# Patient Record
Sex: MALE | Race: BLACK OR AFRICAN AMERICAN | Employment: OTHER | URBAN - METROPOLITAN AREA
[De-identification: names, ages, dates, MRNs, and addresses within clinical notes are randomized per-mention and may not be internally consistent; named-entity substitution may affect disease eponyms.]

---

## 2017-02-03 ENCOUNTER — HOSPITAL ENCOUNTER (OUTPATIENT)
Dept: MRI IMAGING | Age: 62
Discharge: HOME | End: 2017-02-03
Attending: UROLOGY

## 2017-02-03 DIAGNOSIS — N40.1 BENIGN NON-NODULAR PROSTATIC HYPERPLASIA WITH LOWER URINARY TRACT SYMPTOMS: ICD-10-CM

## 2017-02-03 DIAGNOSIS — R97.20 ELEVATED PSA: ICD-10-CM

## 2017-06-22 PROBLEM — R39.89 ABNORMAL PROSTATE EXAM: Status: ACTIVE | Noted: 2017-06-22

## 2018-11-07 ENCOUNTER — HOSPITAL ENCOUNTER (INPATIENT)
Age: 63
LOS: 13 days | Discharge: HOME | DRG: 885 | End: 2018-11-20
Attending: EMERGENCY MEDICINE | Admitting: PSYCHIATRY & NEUROLOGY
Payer: MEDICARE

## 2018-11-07 DIAGNOSIS — F20.9 SCHIZOPHRENIA, UNSPECIFIED TYPE (HCC): Primary | ICD-10-CM

## 2018-11-07 LAB
ALBUMIN SERPL-MCNC: 3.5 G/DL (ref 3.4–5)
ALBUMIN/GLOB SERPL: 0.8 {RATIO} (ref 0.8–1.7)
ALP SERPL-CCNC: 79 U/L (ref 45–117)
ALT SERPL-CCNC: 22 U/L (ref 16–61)
ANION GAP SERPL CALC-SCNC: 9 MMOL/L (ref 3–18)
AST SERPL-CCNC: 13 U/L (ref 15–37)
ATRIAL RATE: 109 BPM
BASOPHILS # BLD: 0 K/UL (ref 0–0.1)
BASOPHILS NFR BLD: 0 % (ref 0–2)
BILIRUB SERPL-MCNC: 0.3 MG/DL (ref 0.2–1)
BUN SERPL-MCNC: 18 MG/DL (ref 7–18)
BUN/CREAT SERPL: 14 (ref 12–20)
CALCIUM SERPL-MCNC: 9.1 MG/DL (ref 8.5–10.1)
CALCULATED P AXIS, ECG09: 83 DEGREES
CALCULATED R AXIS, ECG10: 90 DEGREES
CALCULATED T AXIS, ECG11: 64 DEGREES
CHLORIDE SERPL-SCNC: 103 MMOL/L (ref 100–108)
CK MB CFR SERPL CALC: NORMAL % (ref 0–4)
CK MB SERPL-MCNC: <1 NG/ML (ref 5–25)
CK SERPL-CCNC: 82 U/L (ref 39–308)
CO2 SERPL-SCNC: 24 MMOL/L (ref 21–32)
CREAT SERPL-MCNC: 1.33 MG/DL (ref 0.6–1.3)
DIAGNOSIS, 93000: NORMAL
DIFFERENTIAL METHOD BLD: ABNORMAL
EOSINOPHIL # BLD: 0.1 K/UL (ref 0–0.4)
EOSINOPHIL NFR BLD: 1 % (ref 0–5)
ERYTHROCYTE [DISTWIDTH] IN BLOOD BY AUTOMATED COUNT: 13.3 % (ref 11.6–14.5)
GLOBULIN SER CALC-MCNC: 4.4 G/DL (ref 2–4)
GLUCOSE BLD STRIP.AUTO-MCNC: 219 MG/DL (ref 70–110)
GLUCOSE SERPL-MCNC: 261 MG/DL (ref 74–99)
HCT VFR BLD AUTO: 37.3 % (ref 36–48)
HGB BLD-MCNC: 12.9 G/DL (ref 13–16)
LYMPHOCYTES # BLD: 3.3 K/UL (ref 0.9–3.6)
LYMPHOCYTES NFR BLD: 47 % (ref 21–52)
MAGNESIUM SERPL-MCNC: 2.1 MG/DL (ref 1.6–2.6)
MCH RBC QN AUTO: 31.1 PG (ref 24–34)
MCHC RBC AUTO-ENTMCNC: 34.6 G/DL (ref 31–37)
MCV RBC AUTO: 89.9 FL (ref 74–97)
MONOCYTES # BLD: 0.6 K/UL (ref 0.05–1.2)
MONOCYTES NFR BLD: 9 % (ref 3–10)
NEUTS SEG # BLD: 3.1 K/UL (ref 1.8–8)
NEUTS SEG NFR BLD: 43 % (ref 40–73)
P-R INTERVAL, ECG05: 152 MS
PLATELET # BLD AUTO: 253 K/UL (ref 135–420)
PMV BLD AUTO: 10.4 FL (ref 9.2–11.8)
POTASSIUM SERPL-SCNC: 3.9 MMOL/L (ref 3.5–5.5)
PROT SERPL-MCNC: 7.9 G/DL (ref 6.4–8.2)
Q-T INTERVAL, ECG07: 338 MS
QRS DURATION, ECG06: 84 MS
QTC CALCULATION (BEZET), ECG08: 455 MS
RBC # BLD AUTO: 4.15 M/UL (ref 4.7–5.5)
SODIUM SERPL-SCNC: 136 MMOL/L (ref 136–145)
TROPONIN I SERPL-MCNC: <0.02 NG/ML (ref 0–0.04)
VENTRICULAR RATE, ECG03: 109 BPM
WBC # BLD AUTO: 7.1 K/UL (ref 4.6–13.2)

## 2018-11-07 PROCEDURE — 83735 ASSAY OF MAGNESIUM: CPT | Performed by: EMERGENCY MEDICINE

## 2018-11-07 PROCEDURE — 80053 COMPREHEN METABOLIC PANEL: CPT | Performed by: EMERGENCY MEDICINE

## 2018-11-07 PROCEDURE — 82550 ASSAY OF CK (CPK): CPT | Performed by: EMERGENCY MEDICINE

## 2018-11-07 PROCEDURE — 65220000005 HC RM SEMIPRIVATE PSYCH 3 OR 4 BED

## 2018-11-07 PROCEDURE — 93005 ELECTROCARDIOGRAM TRACING: CPT

## 2018-11-07 PROCEDURE — 82962 GLUCOSE BLOOD TEST: CPT

## 2018-11-07 PROCEDURE — 85025 COMPLETE CBC W/AUTO DIFF WBC: CPT | Performed by: EMERGENCY MEDICINE

## 2018-11-07 PROCEDURE — 99284 EMERGENCY DEPT VISIT MOD MDM: CPT

## 2018-11-07 RX ORDER — LISINOPRIL 20 MG/1
20 TABLET ORAL DAILY
Status: DISCONTINUED | OUTPATIENT
Start: 2018-11-08 | End: 2018-11-20 | Stop reason: HOSPADM

## 2018-11-07 RX ORDER — HALOPERIDOL 5 MG/1
10 TABLET ORAL
Status: DISCONTINUED | OUTPATIENT
Start: 2018-11-07 | End: 2018-11-09

## 2018-11-07 RX ORDER — MAGNESIUM SULFATE 100 %
16 CRYSTALS MISCELLANEOUS AS NEEDED
Status: DISCONTINUED | OUTPATIENT
Start: 2018-11-07 | End: 2018-11-20 | Stop reason: HOSPADM

## 2018-11-07 RX ORDER — DILTIAZEM HYDROCHLORIDE 120 MG/1
120 CAPSULE, COATED, EXTENDED RELEASE ORAL DAILY
Status: DISCONTINUED | OUTPATIENT
Start: 2018-11-08 | End: 2018-11-20 | Stop reason: HOSPADM

## 2018-11-07 RX ORDER — IBUPROFEN 400 MG/1
400 TABLET ORAL
Status: DISCONTINUED | OUTPATIENT
Start: 2018-11-07 | End: 2018-11-20 | Stop reason: HOSPADM

## 2018-11-07 RX ORDER — GLIMEPIRIDE 4 MG/1
4 TABLET ORAL
Status: DISCONTINUED | OUTPATIENT
Start: 2018-11-08 | End: 2018-11-20 | Stop reason: HOSPADM

## 2018-11-07 RX ORDER — PIOGLITAZONEHYDROCHLORIDE 15 MG/1
45 TABLET ORAL
Status: DISCONTINUED | OUTPATIENT
Start: 2018-11-08 | End: 2018-11-20 | Stop reason: HOSPADM

## 2018-11-07 RX ORDER — TRAZODONE HYDROCHLORIDE 50 MG/1
50 TABLET ORAL
Status: DISCONTINUED | OUTPATIENT
Start: 2018-11-07 | End: 2018-11-20 | Stop reason: HOSPADM

## 2018-11-07 RX ORDER — ATORVASTATIN CALCIUM 20 MG/1
10 TABLET, FILM COATED ORAL
Status: DISCONTINUED | OUTPATIENT
Start: 2018-11-07 | End: 2018-11-20 | Stop reason: HOSPADM

## 2018-11-07 RX ORDER — DOCUSATE SODIUM 100 MG/1
100 CAPSULE, LIQUID FILLED ORAL DAILY
Status: DISCONTINUED | OUTPATIENT
Start: 2018-11-08 | End: 2018-11-20 | Stop reason: HOSPADM

## 2018-11-07 RX ORDER — INSULIN LISPRO 100 [IU]/ML
INJECTION, SOLUTION INTRAVENOUS; SUBCUTANEOUS
Status: DISCONTINUED | OUTPATIENT
Start: 2018-11-07 | End: 2018-11-14

## 2018-11-07 RX ORDER — LORAZEPAM 1 MG/1
1 TABLET ORAL
Status: DISCONTINUED | OUTPATIENT
Start: 2018-11-07 | End: 2018-11-20 | Stop reason: HOSPADM

## 2018-11-07 RX ORDER — GUAIFENESIN 100 MG/5ML
81 LIQUID (ML) ORAL DAILY
Status: DISCONTINUED | OUTPATIENT
Start: 2018-11-08 | End: 2018-11-20 | Stop reason: HOSPADM

## 2018-11-07 RX ORDER — HALOPERIDOL 2 MG/1
2 TABLET ORAL
Status: DISCONTINUED | OUTPATIENT
Start: 2018-11-07 | End: 2018-11-20 | Stop reason: HOSPADM

## 2018-11-07 RX ORDER — LORAZEPAM 2 MG/ML
1 INJECTION INTRAMUSCULAR
Status: DISCONTINUED | OUTPATIENT
Start: 2018-11-07 | End: 2018-11-20 | Stop reason: HOSPADM

## 2018-11-07 RX ORDER — LORATADINE 10 MG/1
10 TABLET ORAL
Status: DISCONTINUED | OUTPATIENT
Start: 2018-11-07 | End: 2018-11-20 | Stop reason: HOSPADM

## 2018-11-07 RX ORDER — HALOPERIDOL 5 MG/ML
2 INJECTION INTRAMUSCULAR
Status: DISCONTINUED | OUTPATIENT
Start: 2018-11-07 | End: 2018-11-20 | Stop reason: HOSPADM

## 2018-11-07 RX ORDER — FINASTERIDE 5 MG/1
5 TABLET, FILM COATED ORAL DAILY
Status: DISCONTINUED | OUTPATIENT
Start: 2018-11-08 | End: 2018-11-20 | Stop reason: HOSPADM

## 2018-11-07 NOTE — BH NOTES
The patient arrived on the unit via wheelchair. He was escorted to the unit by ER staff and Security. The patient refused to answer any questions for the admssion assessment. The patient refused to talk to any of the staff. The patient refused to have his  vital signs taken. Monitoring and providing support.

## 2018-11-07 NOTE — ED PROVIDER NOTES
EMERGENCY DEPARTMENT HISTORY AND PHYSICAL EXAM 
 
2:46 PM 
 
 
Date: 11/7/2018 Patient Name: Keila Martin History of Presenting Illness Chief Complaint Patient presents with  Mental Health Problem History Provided By: Patient and  Complaint: Mental Health Problem Duration:6  Hours Timing:  Acute Location: N/A Quality: N/A Severity: Mild Modifying Factors: No modifying or aggravating factors were reported. Associated Symptoms: denies any other associated signs or symptoms Additional History (Context): Keila Martin is a 70 y.o. male with hypertension who presents with an acute mild mental health problem that began 6 hours ago. Police report that the wife called 911 this morning stating that the pt \"wasn't being polite\" and had \"wild eyes\". She stated that she was fearful. Pt reports that he feels okay and he is upset because the police did not explain fully why they brought him to the ED. No modifying or aggravating factors were reported. No other symptoms or concerns were expressed. PCP: Marisol Ruiz DO Past History Review of Systems Review of Systems Constitutional: Negative for chills and fever. Psychiatric/Behavioral: Positive for behavioral problems (mental health problem). All other systems reviewed and are negative. Physical Exam  
 
 
 
Physical Exam  
Constitutional: He is oriented to person, place, and time. He appears well-developed. HENT:  
Head: Normocephalic and atraumatic. Eyes: EOM are normal. Pupils are equal, round, and reactive to light. Neck: Normal range of motion. Neck supple. Cardiovascular: Normal rate, regular rhythm and normal heart sounds. Exam reveals no friction rub. No murmur heard. Pulmonary/Chest: Effort normal and breath sounds normal. No respiratory distress. He has no wheezes. Abdominal: Soft. He exhibits no distension. There is no tenderness.  There is no rebound and no guarding. Musculoskeletal: Normal range of motion. Neurological: He is alert and oriented to person, place, and time. Skin: Skin is warm and dry. Psychiatric: He has a normal mood and affect. His behavior is normal. Thought content normal.  
 
 
 
Diagnostic Study Results EKG shows sinus tach at 109 with normal axis normal intervals there is no ST elevation or depression or hypertrophy. Medical Decision Making I am the first provider for this patient. I reviewed the vital signs, available nursing notes, past medical history, past surgical history, family history and social history. Vital Signs-Reviewed the patient's vital signs. ED Course: Progress Notes, Reevaluation, and Consults: 
 
Provider Notes (Medical Decision Making): This is a 68-year-old gentleman brought in by police for a TDO. He has no complaints  he is alert and oriented ×3. Crisis. He is mildly tachycardic we will send basic labs and reassess. Vs wnl now. 4:50 PM  
Labs reviewed. Crisis to admit. Diagnosis Clinical Impression: No diagnosis found. _______________________________ Attestations: 
Scribe Attestation Deborah Matson acting as a scribe for and in the presence of Silvio Goldman MD     
November 07, 2018 at 2:46 PM 
    
Provider Attestation:     
I personally performed the services described in the documentation, reviewed the documentation, as recorded by the scribe in my presence, and it accurately and completely records my words and actions. November 07, 2018 at 2:46 PM - Silvio Goldman MD   
_______________________________

## 2018-11-07 NOTE — ED TRIAGE NOTES
Patient arrived from home ECO from wife. Police report patient has not been taking medications or bathing self. Patient slow to respond but is cooperative.

## 2018-11-08 LAB
GLUCOSE BLD STRIP.AUTO-MCNC: 148 MG/DL (ref 70–110)
GLUCOSE BLD STRIP.AUTO-MCNC: 162 MG/DL (ref 70–110)
GLUCOSE BLD STRIP.AUTO-MCNC: 172 MG/DL (ref 70–110)

## 2018-11-08 PROCEDURE — 74011250637 HC RX REV CODE- 250/637: Performed by: PSYCHIATRY & NEUROLOGY

## 2018-11-08 PROCEDURE — 65220000005 HC RM SEMIPRIVATE PSYCH 3 OR 4 BED

## 2018-11-08 PROCEDURE — 82962 GLUCOSE BLOOD TEST: CPT

## 2018-11-08 NOTE — BH NOTES
GROUP THERAPY PROGRESS NOTE Quinton Thomas is not participating in [place]. Group time: 30 minutes Personal goal for participation: none Goal orientation: community Group therapy participation: passive Therapeutic interventions reviewed and discussed: goals and procedures Impression of participation: encouraged

## 2018-11-08 NOTE — BH NOTES
The patient is still sitting quietly to himself. He still is not verbally communicating with staff. Still will not answer questions or  allow his vital signs to be taken. In no distress and is being monitored.

## 2018-11-08 NOTE — BH NOTES
Patient was offered dinner earlier, which he refused. He also refused his insulin coverage for tonight.

## 2018-11-08 NOTE — BH NOTES
Pt attempts to intimidate his peers when they come out into the day area. Pt yelled at one of his peers and tried to follow him to his room; staff intervened. Pt abruptly told another peer to \"stop mumbling\". Pt invaded this writer's personal space to ask what I was doing on the computer and to inquire about another Pt. Staff, once again, intervened and Pt is currently resting in the day area. Will continue to monitor for safety.

## 2018-11-08 NOTE — BH NOTES
Pt was lying down, on his mattress, in the day area. A peer from his ASSIGNED ROOM, arrived to the day area and sat down. Pt walked from the far side of the day area, approached his peer, and introduced himself. His peer responded with his name and ended his statement with the word, \"brother\". Pt informed his peer not to call him \"brother\" with an angry tone. Peer appeared to be intimidated and stated, \"yes sir\". Pt walked back over to his mattress. Once his peer was done talking to the nurse, the Pt yelled something across the day area that included his peer's name. His peer reluctantly attempted to respond. This writer intervened and  informed his peer not to respond; he complied. Pt is currently resting in the day area. Will continue to monitor throughout the shift.

## 2018-11-08 NOTE — BH NOTES
Patient alert and pacing the unit. Patient has reached over the nurse's station to turn off the switch. He has not responded to redirection. Patient has been observed staring and slowly raising hand in air and quickly bringing arm back down. Patient was redirected from nurse's station. And asked for the tv to be turned on. Patient was made aware of policy for tv times. Patient wandered day area for a few minutes then laid down.

## 2018-11-08 NOTE — BSMART NOTE
SW assessment/Intervention:  SW made contact with patient as he engaged within the milieu. Patient reports no major concerns at this time. It is reported patient has been refusing medications and was encouraged to comply with medication. Patient remained quiet and expressed no major concerns. SW encouraged group participation and healthy coping skills. SW will continue to monitor patient consulting with treating psychiatrist to complete disposition. COTY Meier, LCSW-E

## 2018-11-08 NOTE — H&P
[address] [facility] Inpatient Admission Note Date of Service:  11/08/18 Historian(s): Viet Dubon and chart review Referral Source: [facility] ED Chief Complaint \"Police officers brought me here\" History of Present Illness Viet Dubon is a 70 y.o. BLACK OR  male with a history of Schizophrenia who was admitted under TDO for exacerbation of psychosis. According to [facility] evaluation, pt's wife took out ECO due to pt not taking medications, hearing voices, not eating, bathing or taking care of ADLs. Wife said he was \"not polite\" and \"wide eyed\". He started showing some bizarre behavior last month and went missing for a day on October 17th. He has also recently walked away from his job because people are staring at him. According to nurse's report, pt has been uncooperative with assessment since admission yesterday evening. He refused all meds and meals. He did allow for his blood sugar to be checked. On evaluation, pt is cooperative with interview and answers questions in a linear and organized fashion. He says he has been taking Haloperidol at home but decided to stop taking other medications. He says \"I found out I could heal myself from sugar Diabetes. \" When asked how he was going to do that, he paused then said \"I can heal myself. \" he denies hallucinations or SI but endorses paranoia. He feels like people are watching him. When asked why he has not been going to work, he said he injured his back early last month and his PCP took him out of work for a few days. When he returned he still had pain in his back, right knee and hip so decided not to go back. Pt denies history of trauma or PTSD symptoms. He denies manic symptoms and history of substance abuse. Psychiatric Review of Systems See HPI Medical Review of Systems Sleep: Poor Appetite: Poor, has lost significant amount of weight. Psychiatric Treatment History Self-injurious behavior/risky thoughts or behaviors (past suicidal ideation/attempt):  
Denies any prior history of thoughts of self-harm or suicidal actions. Violence/Risk to others (past homicidal ideation/attempt):  
Denies any prior history of violence or homicidal ideation. Previous psychiatric medication trials: Haldol Previous psychiatric hospitalizations: He has been hospitalized at [facility] and [facility] but has been stable for a long period of time. Current psychiatric provider: Dr. Sher Deal at [facility]. Allergies No Known Allergies Medical History Past Medical History:  
Diagnosis Date  Blister of foot or toe  BPH (benign prostatic hyperplasia)  DM w/o complication type II ([address]  Elevated prostate specific antigen (PSA)  Hypertension  Lower urinary tract symptoms (LUTS)  Nodular prostate without urinary obstruction  Unspecified disorder of penis History of neurological illness: denies History of head injuries: denies Medication(s) Prior to Admission Medications Prescriptions Last Dose Informant Patient Reported? Taking? Lancets misc Unknown at Unknown time  Yes No  
Si Each. aspirin delayed-release 81 mg tablet Unknown at Unknown time  Yes No  
Si mg.  
atorvastatin (LIPITOR) 10 mg tablet Unknown at Unknown time  Yes No  
Sig: Take  by mouth daily. atorvastatin (LIPITOR) 10 mg tablet Unknown at Unknown time  Yes No  
Sig: 10 mg.  
diltiazem CD (CARDIZEM CD) 120 mg ER capsule Unknown at Unknown time  Yes No  
Sig: Take  by mouth daily. docusate sodium (COLACE) 100 mg capsule Unknown at Unknown time  Yes No  
Si mg.  
finasteride (PROSCAR) 5 mg tablet Unknown at Unknown time  No No  
Sig: Take 1 Tab by mouth daily. finasteride (PROSCAR) 5 mg tablet Unknown at Unknown time  Yes No  
Si mg.  
glimepiride (AMARYL) 4 mg tablet Unknown at Unknown time  Yes No  
Si mg. glucose blood VI test strips (ASCENSIA AUTODISC VI, ONE TOUCH ULTRA TEST VI) strip Unknown at Unknown time  Yes No  
Si Each.  
haloperidol (HALDOL) 0.5 mg tablet Unknown at Unknown time  Yes No  
Sig: Take 0.5 mg by mouth four (4) times daily. haloperidol (HALDOL) 10 mg tablet Unknown at Unknown time  Yes No  
Sig: 10 mg.  
lisinopril (PRINIVIL, ZESTRIL) 20 mg tablet Unknown at Unknown time  Yes No  
Si mg.  
loratadine (CLARITIN) 10 mg tablet Unknown at Unknown time  Yes No  
Sig: Take As Needed. pioglitazone (ACTOS) 30 mg tablet Unknown at Unknown time  Yes No  
Si mg. Facility-Administered Medications: None Substance Abuse History Tobacco: denied Alcohol: denied Marijuana: denied Cocaine: denied Opiate: denied Benzodiazepine: denied Other: denied Consequences: none History of detox: none History of substance abuse treatment: none Family History No family history on file. Psychiatric Family History Sister with mental illness Family history of suicide? No 
 
Social History Pt born and raised in [place]. Raised by parents. Reports normal childhood and denies trauma. He is  and lives with his wife and mother-in-law in [place]. He has GED. He was recently working in [employer]. He is not sure if he still has his job as he has not been to work in several days and has not received any phone call from supervisor. Vitals/Labs Vitals:  
 18 1429 18 1629 18 0800 BP: (!) 152/98 (!) 162/92 (!) 152/98 Pulse: (!) 166 76 100 Resp:  Temp: 98.2 °F (36.8 °C) 97.6 °F (36.4 °C) 97.8 °F (36.6 °C) SpO2: 97% 100% Physical Exam  
Constitutional: He is oriented to person, place, and time. He appears well-developed. HENT:  
Head: Normocephalic and atraumatic. Eyes: EOM are normal. Pupils are equal, round, and reactive to light. Neck: Normal range of motion. Neck supple. Cardiovascular: Normal rate, regular rhythm and normal heart sounds. Exam reveals no friction rub. No murmur heard. Pulmonary/Chest: Effort normal and breath sounds normal. No respiratory distress. He has no wheezes. Abdominal: Soft. He exhibits no distension. There is no tenderness. There is no rebound and no guarding. Musculoskeletal: Normal range of motion. Neurological: He is alert and oriented to person, place, and time. Skin: Skin is warm and dry. Labs:  
Results for orders placed or performed during the hospital encounter of 11/07/18 CBC WITH AUTOMATED DIFF Result Value Ref Range WBC 7.1 4.6 - 13.2 K/uL  
 RBC 4.15 (L) 4.70 - 5.50 M/uL  
 HGB 12.9 (L) 13.0 - 16.0 g/dL HCT 37.3 36.0 - 48.0 % MCV 89.9 74.0 - 97.0 FL  
 MCH 31.1 24.0 - 34.0 PG  
 MCHC 34.6 31.0 - 37.0 g/dL  
 RDW 13.3 11.6 - 14.5 % PLATELET 179 386 - 301 K/uL MPV 10.4 9.2 - 11.8 FL  
 NEUTROPHILS 43 40 - 73 % LYMPHOCYTES 47 21 - 52 % MONOCYTES 9 3 - 10 % EOSINOPHILS 1 0 - 5 % BASOPHILS 0 0 - 2 %  
 ABS. NEUTROPHILS 3.1 1.8 - 8.0 K/UL  
 ABS. LYMPHOCYTES 3.3 0.9 - 3.6 K/UL  
 ABS. MONOCYTES 0.6 0.05 - 1.2 K/UL  
 ABS. EOSINOPHILS 0.1 0.0 - 0.4 K/UL  
 ABS. BASOPHILS 0.0 0.0 - 0.1 K/UL  
 DF AUTOMATED METABOLIC PANEL, COMPREHENSIVE Result Value Ref Range Sodium 136 136 - 145 mmol/L Potassium 3.9 3.5 - 5.5 mmol/L Chloride 103 100 - 108 mmol/L  
 CO2 24 21 - 32 mmol/L Anion gap 9 3.0 - 18 mmol/L Glucose 261 (H) 74 - 99 mg/dL BUN 18 7.0 - 18 MG/DL Creatinine 1.33 (H) 0.6 - 1.3 MG/DL  
 BUN/Creatinine ratio 14 12 - 20 GFR est AA >60 >60 ml/min/1.73m2 GFR est non-AA 54 (L) >60 ml/min/1.73m2 Calcium 9.1 8.5 - 10.1 MG/DL Bilirubin, total 0.3 0.2 - 1.0 MG/DL  
 ALT (SGPT) 22 16 - 61 U/L  
 AST (SGOT) 13 (L) 15 - 37 U/L Alk. phosphatase 79 45 - 117 U/L Protein, total 7.9 6.4 - 8.2 g/dL Albumin 3.5 3.4 - 5.0 g/dL Globulin 4.4 (H) 2.0 - 4.0 g/dL A-G Ratio 0.8 0.8 - 1.7 MAGNESIUM Result Value Ref Range Magnesium 2.1 1.6 - 2.6 mg/dL CARDIAC PANEL,(CK, CKMB & TROPONIN) Result Value Ref Range CK 82 39 - 308 U/L  
 CK - MB <1.0 <3.6 ng/ml CK-MB Index  0.0 - 4.0 % CALCULATION NOT PERFORMED WHEN RESULT IS BELOW LINEAR LIMIT Troponin-I, Qt. <0.02 0.0 - 0.045 NG/ML  
GLUCOSE, POC Result Value Ref Range Glucose (POC) 219 (H) 70 - 110 mg/dL GLUCOSE, POC Result Value Ref Range Glucose (POC) 162 (H) 70 - 110 mg/dL GLUCOSE, POC Result Value Ref Range Glucose (POC) 172 (H) 70 - 110 mg/dL EKG, 12 LEAD, INITIAL Result Value Ref Range Ventricular Rate 109 BPM  
 Atrial Rate 109 BPM  
 P-R Interval 152 ms QRS Duration 84 ms Q-T Interval 338 ms QTC Calculation (Bezet) 455 ms Calculated P Axis 83 degrees Calculated R Axis 90 degrees Calculated T Axis 64 degrees Diagnosis Sinus tachycardia Right atrial enlargement Rightward axis Abnormal ECG When compared with ECG of 21-JUL-2010 11:21, No significant change was found Confirmed by Ashli Barajas ([phone]) on 11/7/2018 4:25:03 PM 
  
 
 
Mental Status Examination Appearance/Hygiene 70 y.o. BLACK OR  male Hygiene: Fair, not malodorous or disheveled Behavior/Social Relatedness Appropriate, cooperative Musculoskeletal Gait/Station: appropriate Tone (flaccid, cogwheeling, spastic): not assessed Psychomotor (hyperkinetic, hypokinetic): calm Involuntary movements (tics, dyskinesias, akathisa, stereotypies): none Speech   Rate, rhythm, volume, fluency and articulation are appropriate Mood   \"ok\" Affect    blunted Thought Process Linear and goal directed Vagueness, incoherence, circumstantiality, tangentiality, neologisms, perseveration, flight of ideas, or self-contradictory statements not present on assessment Thought Content and Perceptual Disturbances Endorses paranoia Denies auditory and visual hallucinations Sensorium and Cognition  AOx4, attention intact, memory intact, language use appropriate, and fund of knowledge age appropriate Insight  limited Judgment limited Suicide Risk Assessment Admission  Date/Time: 11/08/18 [x] Admission  [] Discharge Key Factors:  
Current admission precipitated by suicide attempt? []  Yes 2    [x]  No  
 
1 Suicide Attempt History  [] Past attempts of high lethality 
 
2 []  Past attempts of low lethality 1 [x]  No previous attempts  
 
 
0 Suicidal Ideation []  Constant suicidal thoughts 
 
 
2 []  Intermittent or fleeting suicidal  thoughts 1 [x]  Denies current suicidal thoughts 
 
0 Suicide Plan   []  Has plan with actual OR potential access to planned method 
 
2 []  Has plan without access to planned method 
 
 
1 []  No plan 
 
 
 
 
 
0 Plan Lethality []  Highly lethal plan (Carbon monoxide, gun, hanging, jumping) 2 []  Moderate lethality of plan 
 
 
 
 
1 []  Low lethality of plan (biting, head banging, superficial scratching, pillow over face) 0 Safety Plan Agreement  []  Unwilling OR unable to agree due to impaired reality testing 2   []  Patient is ambivalent and/or guarded 1 [x]  Reliably agrees 
 
 
 
0 Current Morbid Thoughts (reunion fantasies, preoccupations with death) []  Constantly 2 []  Frequently 1 [x]  Rarely 0 Elopement Risk  []  High risk 2 []  Moderate risk 1 [x]   Low risk 0 Symptoms   
[]  Hopeless 
[]  Helpless 
[]  Anhedonia  
[]  Guilt/shame 
[]  Anger/rage 
[]  Anxiety [x]  Insomnia  
[]  Agitation  
[]  Impulsivity  []  5-6 symptoms present 2 []  3-4 symptoms present 1  []  0-2 symptoms present 
 
0 Total Score: 1 
-------------------------------------------------------------------------------------------------------------- Subjective Appraisal of Risk: 
[]  Patient replies not trustworthy: several non-verbal cues. []  Patient replies questionable: trustworthy: at least 1 non-verbal cue. [x]  Patient replies appear trustworthy. Protective measures (select all that apply): 
[x]  Successful past responses to stress 
[]  Spiritual/Evangelical beliefs []  Capacity for reality testing 
[x]  Positive therapeutic relationships [x]  Social supports/connections []  Positive coping skills []  Frustration tolerance/optimism 
[]  Children or pets in the home 
[]  Sense of responsibility to family []  Agrees to treatment plan and follow up High Risk Diagnoses (select all that apply): 
[]  Depression/Bipolar Disorder 
[]  Dual Diagnosis 
[]  Cardiovascular Disease [x]  Schizophrenia 
[]  Chronic Pain 
[]  Epilepsy 
[]  Cancer 
[]  Personality Disorder 
[]  HIV/AIDS []  Multiple Sclerosis Dangerousness Assessment (Suicide, homicide, property destruction. ..) Risk Factors reviewed and risk assessed to be:  [x] low  [] low-moderate  [] moderate 
 [] moderate-high  [] high Protection factors reviewed and risk assessed to be:  [x] low  [] low-moderate  [] moderate 
 [] moderate-high  [] high Response to treatment and risk assessed to be:  [x] low  [] low-moderate  [] moderate 
 [] moderate-high  [] high Support reviewed and risk assessed to be:  [x] low  [] low-moderate  [] moderate 
 [] moderate-high  [] high Acceptance of Discharge and outpatient treatment reviewed and risk assessed to be: 
  [x] low  [] low-moderate  [] moderate 
 [] moderate-high  [] high Overall risk assessed to be:  [x] low  [] low-moderate  [] moderate 
 [] moderate-high  [] high Assessment and Plan Psychiatric Diagnoses:  
Patient Active Problem List  
Diagnosis Code  Blister of foot or toe [id number]  DM w/o complication type II ([facility] [address] E11.9  Hypertension I10  
 Penis pain N48.89  
 BPH (benign prostatic hyperplasia) N40.0  Lower urinary tract symptoms (LUTS) R39.9  Elevated prostate specific antigen (PSA) R97.20  Abnormal prostate exam R39.89  
 Schizophrenia ([facility] [address] F20.9 Medical Diagnoses: See above Psychosocial and contextual factors: chronic mental illness Level of impairment/disability: severe Majel Sam is a 70 y.o. who is currently requiring acute stabilization for exacerbation of Schizophrenia in the setting of medication non-compliance. 1. Admit to locked inpatient behavioral health unit. Start milieu, group, art and occupation therapy. 2. Resume home medications, Haldol 10mg qhs 
3. Routine labs ordered and reviewed by this provider. 4. Reviewed instructions, risks, benefits and side effects. 5. Start disposition planning; verify upcoming outpatient appointments with therapist and/or psychiatric medication prescriber. 6. Tentative date of discharge: 3-5 days Anastasiya Bruce MD 
Psychiatrist 
[facility]

## 2018-11-08 NOTE — BH NOTES
Patient refused oral medication. Patient did allow this nurse to assess blood glucose. Encouraged po intake of medication. Patient adamantly refused.

## 2018-11-08 NOTE — PROGRESS NOTES
Problem: Psychosis Goal: *STG: Decreased delusional thinking Daily, patient will demonstrate improved thought patterns AEB logical and coherent speech. Outcome: Not Progressing Towards Goal 
Lacks insight, 
Goal: *STG/LTG: Complies with medication therapy Daily and every shift, patient will comply with medication regimen. Outcome: Not Progressing Towards Goal 
Refused all medications. Goal: *STG/LTG: Demonstrates improved thought patterns as evidenced by logical and coherent speech Daily and every shift patient will demonstrate decreased or no hallucinations during hospital stay. Outcome: Not Progressing Towards Goal 
Patient is psychotic Comments: Patient has been in the day area for most of the shift. He has an  angry affect and only respond with yes or no. He has refused medications,  Would not  eat or drink,. 
 
 
14:30 Patient approached nurse and asked for underwear. Patient apologized for his behavior earlier. He smiled and stated \" I didn't mean any harm. \" talked about patient needing to take his medications he refused earlier, he stated  He would think about it.

## 2018-11-08 NOTE — BSMART NOTE
ACTIVITIES THERAPY PROGRESS NOTE Group time:1230 Did not verbally respond when asked to group, just stared/glared at this writer.

## 2018-11-09 PROCEDURE — 74011250636 HC RX REV CODE- 250/636: Performed by: PSYCHIATRY & NEUROLOGY

## 2018-11-09 PROCEDURE — 65220000005 HC RM SEMIPRIVATE PSYCH 3 OR 4 BED

## 2018-11-09 RX ORDER — HALOPERIDOL 5 MG/1
5 TABLET ORAL 2 TIMES DAILY
Status: DISCONTINUED | OUTPATIENT
Start: 2018-11-09 | End: 2018-11-10 | Stop reason: ALTCHOICE

## 2018-11-09 RX ORDER — HALOPERIDOL 5 MG/ML
5 INJECTION INTRAMUSCULAR
Status: DISCONTINUED | OUTPATIENT
Start: 2018-11-09 | End: 2018-11-20 | Stop reason: HOSPADM

## 2018-11-09 RX ADMIN — LORAZEPAM 1 MG: 2 INJECTION INTRAMUSCULAR; INTRAVENOUS at 08:30

## 2018-11-09 RX ADMIN — HALOPERIDOL LACTATE 2 MG: 5 INJECTION, SOLUTION INTRAMUSCULAR at 08:30

## 2018-11-09 NOTE — BSMART NOTE
ACTIVITIES THERAPY PROGRESS NOTE Group time:1400 The patient was not disturbed for group at staff discretion.

## 2018-11-09 NOTE — PROGRESS NOTES
[address] Inpatient Progress Note Date of Service: 11/09/18 Hospital Day: 2 Subjective/Interval History 11/09/18 Treatment Team Notes:  Notes reviewed and/or discussed and report that Carson Lopez is a 71 y/o man admitted for exacerbation of Schizophrenia. Per nursing report, pt has not eaten since admission and told nurse today he was fasting. He also has not taken any medications. Court hearing for IVC and forced medications this morning. Pt was IC and order was given forced medications. Pt also with incidence of aggression towards another peer and staff. He was given Haldol 2mg IM. Objective Visit Vitals BP (!) 143/94 (BP 1 Location: Right arm, BP Patient Position: Sitting) Pulse 100 Temp 96.9 °F (36.1 °C) Resp 18 SpO2 100% Recent Results (from the past 24 hour(s)) GLUCOSE, POC Collection Time: 11/08/18  8:24 PM  
Result Value Ref Range Glucose (POC) 148 (H) 70 - 110 mg/dL Mental Status Examination Appearance/Hygiene 70 y.o. BLACK OR  male Hygiene: [address] Behavior/Social Relatedness Appropriate Musculoskeletal Gait/Station: appropriate Tone (flaccid, cogwheeling, spastic): not assessed Psychomotor (hyperkinetic, hypokinetic): calm Involuntary movements (tics, dyskinesias, akathisa, stereotypies): none Speech   Rate, rhythm, volume, fluency and articulation are appropriate Mood   irritable Affect    congruent Thought Process Linear and goal directed Thought Content and Perceptual Disturbances Denies self-injurious behavior (SIB), suicidal ideation (SI), aggressive behavior or homicidal ideation (HI) Denies auditory and visual hallucinations Sensorium and Cognition  Grossly intact Insight  poor Judgment poor Assessment/Plan Psychiatric Diagnoses:  
Patient Active Problem List  
Diagnosis Code  Blister of foot or toe [id number]  DM w/o complication type II ([facility] [address] E11.9  Hypertension I10  
 Penis pain N48.89  
 BPH (benign prostatic hyperplasia) N40.0  Lower urinary tract symptoms (LUTS) R39.9  Elevated prostate specific antigen (PSA) R97.20  Abnormal prostate exam R39.89  
 Schizophrenia ([facility] [address] F20.9 Level of impairment/disability: Severe Lindi Parents is a 70 y.o. who is currently admitted for exacerbation of psychosis. 1.  Start Haldol 5mg bid. If refuses oral, pt to get 5mg IM. 2.  Reviewed instructions, risks, benefits and side effects of medications 3. Disposition/Discharge Date: self-care/home, to be determined. Michael Bazan MD DR. [facility] Psychiatry

## 2018-11-09 NOTE — BH NOTES
GROUP THERAPY PROGRESS NOTE Fady Casas is participating in [place] [place]. Group time: 30 minutes Personal goal for participation: N/A Goal orientation: community Group therapy participation: Patient did not participate in group Therapeutic interventions reviewed and discussed: Developing positive and healthy coping skills. Impression of participation: Staff encouraged patient to participate in group, but patient refused.

## 2018-11-09 NOTE — ROUTINE PROCESS
Pt refused blood glucose done this morning despite of encouragement;  pt insisted that he was fine. Pt was educated about diabetes and importance of complying with treatment, but pt continue to be non receptive with information. Will monitor.

## 2018-11-09 NOTE — BH NOTES
GROUP THERAPY PROGRESS NOTE Natanael Rock is not participating in [place]. Staff encouraged and pt declined.

## 2018-11-09 NOTE — BH NOTES
Writer has observed patient's behavior throughout the shift. Patient has not eaten any meals. Patient has stated the reason why he refuse to eat is because of a spiritual experience (fasting). Patient also stated that he will not eat any meals for the rest of this week. Patient has showered and has conducted his personal hygiene and has been free of falls and other harms, and staff will continue to monitor patient's safety and behavior for the duration of the shift.

## 2018-11-09 NOTE — BSMART NOTE
SOCIAL WORK GROUP THERAPY PROGRESS NOTE Group Time:  10am 
 
Group Topic:  Coping Skills Group Participation: Pt was not disturbed due to staff discretion
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
## 2018-11-09 NOTE — BH NOTES
Patient advised writer that he is on a 3 day fast and will begin taking his medication on Saturday. Patient stated that although he will take his medication she will not take his diabetic medicine because \"I am healed from the three day fast I am no longer a diabetic\". Patient has been educated and encouraged to adhere to diabetic protocols with patient stating \"I know but I know I'm healed. I can be ok with diet and exercise\".

## 2018-11-10 LAB — GLUCOSE BLD STRIP.AUTO-MCNC: 256 MG/DL (ref 70–110)

## 2018-11-10 PROCEDURE — 65220000005 HC RM SEMIPRIVATE PSYCH 3 OR 4 BED

## 2018-11-10 PROCEDURE — 82962 GLUCOSE BLOOD TEST: CPT

## 2018-11-10 PROCEDURE — 74011250637 HC RX REV CODE- 250/637: Performed by: PSYCHIATRY & NEUROLOGY

## 2018-11-10 RX ORDER — HALOPERIDOL 5 MG/1
5 TABLET ORAL 2 TIMES DAILY
Status: DISCONTINUED | OUTPATIENT
Start: 2018-11-10 | End: 2018-11-11

## 2018-11-10 RX ADMIN — HALOPERIDOL 5 MG: 5 TABLET ORAL at 11:35

## 2018-11-10 RX ADMIN — HALOPERIDOL 5 MG: 5 TABLET ORAL at 20:34

## 2018-11-10 RX ADMIN — ATORVASTATIN CALCIUM 10 MG: 20 TABLET, FILM COATED ORAL at 20:34

## 2018-11-10 NOTE — PROGRESS NOTES
NUTRITION Nursing Referral: [facility] 
 
RECOMMENDATIONS / PLAN:  
 
- Add nutritional supplements: Glucerna Shake BID 
- Continue inpatient RD monitoring and evaluation. NUTRITION DIAGNOSIS & INTERVENTIONS:  
 
[x] Meals/snacks: modified composition: 
[x] Medical food supplement therapy: initiate Nutrition Diagnosis:  Unintended weight loss related to inadequate energy intake as evidenced by pt with 13% weight loss over the last 6 months. Patient meets criteria for Moderate Protein Calorie Malnutrition as evidenced by:  
ASPEN Malnutrition Criteria Acute Illness, Chronic Illness, or Social/Enviornmental: Social/environmental illness Energy Intake: <75% of estimated energy requirement for greater than/equal to 3 months Weight Loss: >10% x 6 month 
ASPEN Malnutrition Score - Social/Environmental Illness: 7 Social/Environmental Illness - Malnutrition Diagnosis: Moderate malnutrition. ASSESSMENT:  
 
Pt refusing meals earlier in admission, now eating well. Pt reports decrease in quantity of food consumed; was eating 3 meals, then only 2 meals since Feb 2018. States it is 2/2 wife not preparing meals. Pt also states he was fasting x 3 days PTA for [employer] purposes. Pt with significant weight loss and is below IBW. Discussed the importance of eating and consuming 3 balanced meals a day, pt receptive. Discussed supplements in house and if needed on discharge, as pt reports consuming them before; encouraged increasing meals first before supplements. Average intake adequate to meet patients estimated nutritional needs:   [] Yes     [] No      [x] Unable to determine at this time Diet: DIET DIABETIC WITH OPTIONS Consistent Carb 1800kcal; Regular DIET NUTRITIONAL SUPPLEMENTS Dinner, Breakfast; [address] Food Allergies: NKFA Current Appetite:   [x] Good     [] Fair     [] Poor     [] Other: 
Appetite/meal intake prior to admission:   [] Good     [x] Fair     [] Poor     [] Other: Feeding Limitations:  [] Swallowing Difficulty       [] Chewing Difficulty       [] Other Current Meal Intake: No data found. Gastrointestinal Issues:  [] Yes    [x] No  
Skin Integrity:  WDL Pertinent Medications:  Reviewed Labs:  Reviewed Anthropometrics: 
Ht Readings from Last 1 Encounters:  
11/11/18 5' 9\" (1.753 m) Last 3 Recorded Weights in this Encounter 11/11/18 1229 Weight: 58.1 kg (128 lb) Body mass index is 18.9 kg/m². Weight History: -13.3% x 6 months Weight Metrics 11/11/2018 5/9/2018 6/22/2017 3/23/2017 12/19/2016 11/23/2015 7/13/2015 Weight 128 lb 145 lb 165 lb 150 lb 150 lb 160 lb 160 lb BMI 18.9 kg/m2 21.41 kg/m2 24.37 kg/m2 22.15 kg/m2 22.15 kg/m2 23.62 kg/m2 23.62 kg/m2 Admitting Diagnosis: Schizophrenia ([facility] Past Medical History:  
Diagnosis Date  Blister of foot or toe  BPH (benign prostatic hyperplasia)  DM w/o complication type II ([facility] [address]  Elevated prostate specific antigen (PSA)  Hypertension  Lower urinary tract symptoms (LUTS)  Nodular prostate without urinary obstruction  Unspecified disorder of penis Education Needs:        [x] None identified  [] Identified - Not appropriate at this time  []  Identified and addressed - refer to education log Learning Limitations:   [x] None identified  [] Identified Cultural, Hinduism & ethnic food preferences identified:  [x] None    [] Yes ESTIMATED NUTRITION NEEDS:  
 
7257-7872 kcal (MSJx1.2-1.3), 58-73 gm protein (0.8-1 gm/kg), 1 mL/kcal 
Based on: 73 kg kg       [] Actual BW      [x] IBW MONITORING & EVALUATION:  
 
Nutrition Goal(s): 1. Po intake of meals will meet >75% of patient estimated nutritional needs within the next 7 days. Outcome:   [] Met    []  Not Met   [x] New/Initial Goal 
 
Monitor:  [x] Food and beverage intake   [x] Diet order   [x] Nutrition-focused physical findings   [] Weight Previous Recommendations (for follow-up assessments only):     []   Implemented       []   Not Implemented (RD to address)   [] No Longer Appropriate   [] No Recommendation Made Discharge Planning: regular diet [x]  Participated in care planning, discharge planning, & interdisciplinary rounds as appropriate Tiffanie Deal RD, [facility] Pager: [phone]

## 2018-11-10 NOTE — BH NOTES
Patient refused medication and blood glucose assessment despite staff encouragement. Will continue to monitor and provide support as needed.

## 2018-11-10 NOTE — PROGRESS NOTES
Problem: Psychosis Goal: *STG: Decreased delusional thinking Daily, patient will demonstrate improved thought patterns AEB logical and coherent speech. Outcome: Not Progressing Towards Goal 
Patient continues to make delusional statements. Goal: *STG: Remains safe in hospital 
Daily and every shift patient will contract for safety and will not injure self. Outcome: Progressing Towards Goal 
Patient has remained free of harm to self and others. Goal: *STG/LTG: Complies with medication therapy Daily and every shift, patient will comply with medication regimen. Outcome: Not Progressing Towards Goal 
Patient did not take medications this morning. Comments: Patient has been up and social within milieu and has reported he is feeling fine today and slept \"ok, I guess with that. Know there are things I don't discuss on Saturday and Sunday so I'm gonna have to end this conversation here if that's ok\". Patient then stated From Friday sundown to Saturday sundown, I don't shop or watch television it's my Bahai. I'll be in my room until sundown at least. Oh can I have an extra milk and juice when I am finished\". Patient has requested Jainism materials with  notified of patient's request. Patient has again refused his AM medications. Patient has requested hygiene products this morning. Patient denies SI/HI, Denies A/VH, however is irritable at times towards peers. Will monitor for safety with support as needed throughout treatment regimen.

## 2018-11-10 NOTE — PROGRESS NOTES
[address] Inpatient Progress Note Date of Service: 11/10/18 Hospital Day: 3 Subjective/Interval History 11/10/18 Treatment Team Notes:  Notes reviewed and/or discussed and report that Carson Lopez is a 69 y/o man admitted for exacerbation of Schizophrenia. Per nursing report, pt continues to refuse all medications. He was not given IM Haldol last night as order was not clear in the EHR. He told nurse he will take Haldol at 11:29 this morning. If not, he is to get it IM. He is still irritable with some peers but no aggression or need for IM prn since yesterday. Pt seen for assessment. He stated \"I don't conduct any business on the Sabbath and this right here is conducting business so we will have to reschedule till tomorrow. \" 
 
 
Objective Visit Vitals BP (!) 152/93 (BP 1 Location: Right arm, BP Patient Position: Sitting) Pulse 100 Temp 97.2 °F (36.2 °C) Resp 18 SpO2 100% No results found for this or any previous visit (from the past 24 hour(s)). Mental Status Examination Appearance/Hygiene 70 y.o. BLACK OR  male Hygiene: [address] Behavior/Social Relatedness Appropriate Musculoskeletal Gait/Station: appropriate Tone (flaccid, cogwheeling, spastic): not assessed Psychomotor (hyperkinetic, hypokinetic): calm Involuntary movements (tics, dyskinesias, akathisa, stereotypies): none Speech   Rate, rhythm, volume, fluency and articulation are appropriate Mood   irritable Affect    congruent Thought Process Linear and goal directed Thought Content and Perceptual Disturbances Denies self-injurious behavior (SIB), suicidal ideation (SI), aggressive behavior or homicidal ideation (HI) Denies auditory and visual hallucinations Sensorium and Cognition  Grossly intact Insight  poor Judgment poor Assessment/Plan Psychiatric Diagnoses:  
Patient Active Problem List  
Diagnosis Code  Blister of foot or toe [id number]  DM w/o complication type II ([facility] [address] E11.9  Hypertension I10  
 Penis pain N48.89  
 BPH (benign prostatic hyperplasia) N40.0  Lower urinary tract symptoms (LUTS) R39.9  Elevated prostate specific antigen (PSA) R97.20  Abnormal prostate exam R39.89  
 Schizophrenia ([facility] [address] F20.9 Level of impairment/disability: Severe Burke Silk is a 70 y.o. who is currently admitted for exacerbation of psychosis. 1.  Continue Haldol 5mg bid. If refuses oral, pt to get 5mg IM. 2.  Reviewed instructions, risks, benefits and side effects of medications 3. Disposition/Discharge Date: self-care/home, to be determined. MD [facility]. [facility]

## 2018-11-10 NOTE — BH NOTES
Patient continues to refuse diabetic medications and accu checks despite education and encouragement towards treatment compliance.

## 2018-11-10 NOTE — BH NOTES
Patient appeared to be calm during this shift. He socialized with others in the milieu. Patient had glasses and shoes dropped off for him. Patient stated he was constipated but didn't want any medication as he was still fasting. Patient did not take his evening medication, but stated he would begin tomorrow after his fast was over. He did not have any falls or injuries during this shift. Staff will continue to monitor for safety.

## 2018-11-10 NOTE — BH NOTES
GROUP THERAPY PROGRESS NOTE Tremayne Flores is participating in [place]. Group time: 15 minutes Personal goal for participation: none stated Goal orientation: community Group therapy participation: active Therapeutic interventions reviewed and discussed: goals and rules

## 2018-11-11 LAB
GLUCOSE BLD STRIP.AUTO-MCNC: 159 MG/DL (ref 70–110)
GLUCOSE BLD STRIP.AUTO-MCNC: 184 MG/DL (ref 70–110)
GLUCOSE BLD STRIP.AUTO-MCNC: 219 MG/DL (ref 70–110)

## 2018-11-11 PROCEDURE — 82962 GLUCOSE BLOOD TEST: CPT

## 2018-11-11 PROCEDURE — 74011250637 HC RX REV CODE- 250/637: Performed by: PSYCHIATRY & NEUROLOGY

## 2018-11-11 PROCEDURE — 65220000005 HC RM SEMIPRIVATE PSYCH 3 OR 4 BED

## 2018-11-11 RX ORDER — HALOPERIDOL 5 MG/1
7.5 TABLET ORAL 2 TIMES DAILY
Status: DISCONTINUED | OUTPATIENT
Start: 2018-11-11 | End: 2018-11-15

## 2018-11-11 RX ADMIN — DILTIAZEM HYDROCHLORIDE 120 MG: 120 CAPSULE, COATED, EXTENDED RELEASE ORAL at 08:57

## 2018-11-11 RX ADMIN — LISINOPRIL 20 MG: 20 TABLET ORAL at 09:00

## 2018-11-11 RX ADMIN — Medication 81 MG: at 09:00

## 2018-11-11 RX ADMIN — HALOPERIDOL 5 MG: 5 TABLET ORAL at 07:47

## 2018-11-11 RX ADMIN — DOCUSATE SODIUM 100 MG: 100 CAPSULE, LIQUID FILLED ORAL at 09:00

## 2018-11-11 RX ADMIN — ATORVASTATIN CALCIUM 10 MG: 20 TABLET, FILM COATED ORAL at 20:45

## 2018-11-11 RX ADMIN — GLIMEPIRIDE 4 MG: 4 TABLET ORAL at 07:47

## 2018-11-11 RX ADMIN — FINASTERIDE 5 MG: 5 TABLET, FILM COATED ORAL at 09:00

## 2018-11-11 RX ADMIN — PIOGLITAZONE HYDROCHLORIDE 45 MG: 15 TABLET ORAL at 07:47

## 2018-11-11 RX ADMIN — HALOPERIDOL 7.5 MG: 5 TABLET ORAL at 20:48

## 2018-11-11 RX ADMIN — GLIMEPIRIDE 4 MG: 4 TABLET ORAL at 16:55

## 2018-11-11 NOTE — PROGRESS NOTES
[address] Inpatient Progress Note Date of Service: 11/11/18 Hospital Day: 4 Subjective/Interval History 11/11/18 Treatment Team Notes:  Notes reviewed and/or discussed and report that Jose Amin is a 71 y/o man admitted for exacerbation of Schizophrenia. Per nursing report, pt is doing better this morning. He ate yesterday evening and took medications and did the same today. Much more pleasant. Pt seen for assessment. Pt endorses euthymic mood. His affect is bright. He is very pleasant and friendly today. He still endorses \"a bit\" of auditory hallucinations. He denies visual hallucinations or paranoia. Objective Visit Vitals /90 (BP 1 Location: Right arm, BP Patient Position: Sitting) Pulse 85 Temp 96.4 °F (35.8 °C) Resp 18 SpO2 100% Recent Results (from the past 24 hour(s)) GLUCOSE, POC Collection Time: 11/10/18  8:38 PM  
Result Value Ref Range Glucose (POC) 256 (H) 70 - 110 mg/dL GLUCOSE, POC Collection Time: 11/11/18  7:21 AM  
Result Value Ref Range Glucose (POC) 219 (H) 70 - 110 mg/dL Mental Status Examination Appearance/Hygiene 70 y.o. BLACK OR  male Hygiene: [address] Behavior/Social Relatedness Appropriate Musculoskeletal Gait/Station: appropriate Tone (flaccid, cogwheeling, spastic): normal 
Psychomotor (hyperkinetic, hypokinetic): calm Involuntary movements (tics, dyskinesias, akathisa, stereotypies): none Speech   Rate, rhythm, volume, fluency and articulation are appropriate Mood   euthymic Affect    congruent Thought Process Linear and goal directed Thought Content and Perceptual Disturbances Denies self-injurious behavior (SIB), suicidal ideation (SI), aggressive behavior or homicidal ideation (HI) Denies auditory and visual hallucinations Sensorium and Cognition  Grossly intact Insight  limited Judgment fair Assessment/Plan Psychiatric Diagnoses: Patient Active Problem List  
Diagnosis Code  Blister of foot or toe [id number]  DM w/o complication type II ([facility] [address] E11.9  Hypertension I10  
 Penis pain N48.89  
 BPH (benign prostatic hyperplasia) N40.0  Lower urinary tract symptoms (LUTS) R39.9  Elevated prostate specific antigen (PSA) R97.20  Abnormal prostate exam R39.89  
 Schizophrenia ([facility] [address] F20.9 Level of impairment/disability: Severe Marcianne Burton is a 70 y.o. who is currently admitted for exacerbation of psychosis. 1.  Continue Haldol 5mg bid. If refuses oral, pt to get 5mg IM. 2.  Reviewed instructions, risks, benefits and side effects of medications 3. Disposition/Discharge Date: self-care/home, to be determined. Bo Mclean MD DR. [facility]

## 2018-11-11 NOTE — BH NOTES
is not participating in Anger Management. 
  
Group time: 30 Minutes 
  
Personal goal for participation: To Reduce Your Anger To A Calm Level (Patient Did Not Participate). 
  
Goal orientation: Social 
  
Group therapy participation: Patient Did Not Participate. 
  
Therapeutic interventions reviewed and discussed: Controlling your anger and getting tips on how to calm your anger. Risks factors of getting angry and how angry can hinder your health and daily activities, plus 6 top tips for managing your anger. 
  
Impression of participation: Staff encouraged patient to participate, but patient refused.

## 2018-11-11 NOTE — PROGRESS NOTES
Patient informed this staff member that he wanted a room change. When asked why patient did not respond back verbally . His affect was angry and stared at this staff member. Patient was informed that there was no other beds available ,but he could sit in the day area . Patient sat in day area for awhile and then return back to his bed.

## 2018-11-11 NOTE — PROGRESS NOTES
Problem: Psychosis Goal: *STG: Remains safe in hospital 
Daily and every shift patient will contract for safety and will not injure self. Outcome: Progressing Towards Goal 
Patient has remained free of harm to self and others while in facility. Goal: *STG/LTG: Complies with medication therapy Daily and every shift, patient will comply with medication regimen. Outcome: Progressing Towards Goal 
Patient has been compliant with prescribed medications. Comments: Patient has been up in milieu this morning and has been cooperative and pleasant with bright mood. Patient greeted all staff and several of his peers. \"I feel fine today\". Patient stated \"I got up rather early this morning. I had a routine before I came to the hospital. I am used to get up early every morning and I feel like I'm getting back in that routine\". Patient has been compliant with prescribed medications this morning with  Patient reported sleeping good, I woke up early but I went back to bed and slept alright. Patient denies SI/HI, with no safety issues noted this morning. Patient denies A/VH \"no feeling good this morning\". Patient has been eating meals and taking medications on own since yesterday afternoon. Will continue to encourage and monitor for safety.

## 2018-11-11 NOTE — BH NOTES
Patient was redirected to lower voice while talking in room. Patient seemed to understand. Patient displayed anger during dinner but was able to regroup. Patient interaction with peers and staff was appropriate. Patient was able to eat meals and comply to medication protocol.

## 2018-11-12 LAB
GLUCOSE BLD STRIP.AUTO-MCNC: 183 MG/DL (ref 70–110)
GLUCOSE BLD STRIP.AUTO-MCNC: 259 MG/DL (ref 70–110)
GLUCOSE BLD STRIP.AUTO-MCNC: 329 MG/DL (ref 70–110)

## 2018-11-12 PROCEDURE — 74011250637 HC RX REV CODE- 250/637: Performed by: PSYCHIATRY & NEUROLOGY

## 2018-11-12 PROCEDURE — 65220000005 HC RM SEMIPRIVATE PSYCH 3 OR 4 BED

## 2018-11-12 PROCEDURE — 82962 GLUCOSE BLOOD TEST: CPT

## 2018-11-12 RX ADMIN — GLIMEPIRIDE 4 MG: 4 TABLET ORAL at 09:51

## 2018-11-12 RX ADMIN — DOCUSATE SODIUM 100 MG: 100 CAPSULE, LIQUID FILLED ORAL at 08:29

## 2018-11-12 RX ADMIN — DILTIAZEM HYDROCHLORIDE 120 MG: 120 CAPSULE, COATED, EXTENDED RELEASE ORAL at 08:28

## 2018-11-12 RX ADMIN — HALOPERIDOL 7.5 MG: 5 TABLET ORAL at 08:28

## 2018-11-12 RX ADMIN — Medication 81 MG: at 08:28

## 2018-11-12 RX ADMIN — GLIMEPIRIDE 4 MG: 4 TABLET ORAL at 16:56

## 2018-11-12 RX ADMIN — LISINOPRIL 20 MG: 20 TABLET ORAL at 08:28

## 2018-11-12 RX ADMIN — HALOPERIDOL 7.5 MG: 5 TABLET ORAL at 20:40

## 2018-11-12 RX ADMIN — ATORVASTATIN CALCIUM 10 MG: 20 TABLET, FILM COATED ORAL at 20:40

## 2018-11-12 RX ADMIN — FINASTERIDE 5 MG: 5 TABLET, FILM COATED ORAL at 09:00

## 2018-11-12 RX ADMIN — PIOGLITAZONE HYDROCHLORIDE 45 MG: 15 TABLET ORAL at 06:50

## 2018-11-12 NOTE — BSMART NOTE
SW assessment/Intervention:  SW made contact with patient as he remained in his room. Patient was observed cleaning and making his bed. Patient reports no major issues or concerns. Patient was encouraged to attend group sessions. Patient is reported to be isolative and not attending groups. Patient denies SI/HI. Patient endorses at times AH. SW encouraged patient to comply with medications because they will assist with feeling better. SW will continue to monitor patient during hospitalization consulting with treating psychiatrist to complete disposition. COTY Holder, LCSW-E

## 2018-11-12 NOTE — BSMART NOTE
ART THERAPY GROUP PROGRESS NOTE Group time:1500 The patient did not awaken/get up when called for group.

## 2018-11-12 NOTE — BH NOTES
Patient remained calm on the unit. He contracted for safety, stated that he is doing fine and do not have any thoughts to harm self or others. Patient denies auditory hallucinations, ate 100% dinner, remained in the day room for mos part of the shift. Patient was cooperative getting his insulin checked this evening. Patient chose to remain on the unit and did not participate in groups this shift. Patient encouraged to remain compliant with treatment and continue to utilize non slip footwear for safety. Patient free of falls this shift. Will continue to monitor

## 2018-11-12 NOTE — BH NOTES
GROUP THERAPY PROGRESS NOTE Quinton Thomas is not participating in Recreational Therapy. Group time: 1 hour Personal goal for participation: N/A Goal orientation: social 
 
Group therapy participation: Patient chose not to participate in group Therapeutic interventions reviewed and discussed: Physical activity, exercise and socialization Impression of participation: Staff encouraged patient to participate in group, but patient refused.

## 2018-11-12 NOTE — BSMART NOTE
SOCIAL WORK GROUP THERAPY PROGRESS NOTE Group Time:  10am 
 
Group Topic:  Coping Skills Group Participation: Pt continues to stay in room & isolate & not wanting to be disturbed. Staff offered support & pt still wanted to be left alone.

## 2018-11-12 NOTE — BH NOTES
GROUP THERAPY PROGRESS NOTE Nellistonjacinda Garber participated in [place]. Group time: 30 minutes Personal goal for participation: Discuss goals for the day, and also answer any questions regarding unit guidelines. Goal orientation: community Group therapy participation: active Therapeutic interventions reviewed and discussed: Pts discussed their goals for the day, asked questions regarding rules of unit which writer addressed. Also , pts discussed how they feel today, and how they slept. Impression of participation: The pt actively listened to others speak in group, although he did not share a goal for the day.

## 2018-11-12 NOTE — PROGRESS NOTES
Patient spent most of his day in bed resting, he got up and  ate 100% of his dinner , he allowed staff to have his vitals taken, he took his medicine and returned to bed.staff will continue to monitor patient for health and safety.

## 2018-11-12 NOTE — PROGRESS NOTES
Problem: Psychosis Goal: *STG: Decreased delusional thinking Daily, patient will demonstrate improved thought patterns AEB logical and coherent speech. Outcome: Progressing Towards Goal 
Variance: Patient slowly responding Comments: Paranoid Goal: *STG: Remains safe in hospital 
Daily and every shift patient will contract for safety and will not injure self. Outcome: Progressing Towards Goal 
Verbally contracts for safety Goal: *STG/LTG: Complies with medication therapy Daily and every shift, patient will comply with medication regimen. Outcome: Progressing Towards Goal 
Compliant Problem: Diabetes Self-Management Goal: *Disease process and treatment process Define diabetes and identify own type of diabetes; list 3 options for treating diabetes. Outcome: Progressing Towards Goal 
Pt is compliant with accu checks oral medication and diet but refuses insulin coverage. Problem: Falls - Risk of 
Goal: *Absence of Falls Document Shayy Brothers Fall Risk and appropriate interventions in the flowsheet. Daily patient will remain free from falls. Outcome: Progressing Towards Goal 
Fall Risk Interventions: 
  
 
  
 
Medication Interventions: Teach patient to arise slowly Comments: Pt is pleasant and cooperative this morning. He went back to bed after VS meds and breakfast. Pt denies any thoughts of harming self or others and hallucinations. He is compliant with medication. Pt did not attend morning group with therapist. He did get towels and toiletries to take a shower.

## 2018-11-12 NOTE — PROGRESS NOTES
[address] Inpatient Progress Note Date of Service: 11/12/18 Hospital Day: 5 Subjective/Interval History 11/12/18 Treatment Team Notes:  Notes reviewed and/or discussed and report that Iam Cutler is a 71 y/o man admitted for exacerbation of Schizophrenia. Per nursing report, pt is doing better this morning. He ate yesterday evening and took medications and did the same today. Much more pleasant.  this morning but pt refuses insulin coverage. Pt seen for assessment. Pt endorses euthymic mood. His affect is bright. He still endorses auditory hallucinations. He denies visual hallucinations or paranoia. He says voices are telling him not to go to group sessions today. He chose not to go to SW group later this morning. He has been withdrawn to his room. Objective Visit Vitals /82 (BP 1 Location: Right arm, BP Patient Position: Sitting) Pulse 99 Temp 97.1 °F (36.2 °C) Resp 18 Ht 5' 9\" (1.753 m) Wt 58.1 kg (128 lb) SpO2 100% BMI 18.90 kg/m² Recent Results (from the past 24 hour(s)) GLUCOSE, POC Collection Time: 11/11/18  3:39 PM  
Result Value Ref Range Glucose (POC) 159 (H) 70 - 110 mg/dL GLUCOSE, POC Collection Time: 11/12/18  6:36 AM  
Result Value Ref Range Glucose (POC) 259 (H) 70 - 110 mg/dL GLUCOSE, POC Collection Time: 11/12/18 11:47 AM  
Result Value Ref Range Glucose (POC) 329 (H) 70 - 110 mg/dL Mental Status Examination Appearance/Hygiene 70 y.o. BLACK OR  male Hygiene: [address] Behavior/Social Relatedness Appropriate Musculoskeletal Gait/Station: appropriate Tone (flaccid, cogwheeling, spastic): normal 
Psychomotor (hyperkinetic, hypokinetic): calm Involuntary movements (tics, dyskinesias, akathisa, stereotypies): none Speech   Rate, rhythm, volume, fluency and articulation are appropriate Mood   euthymic Affect    congruent Thought Process Linear and goal directed Thought Content and Perceptual Disturbances Denies self-injurious behavior (SIB), suicidal ideation (SI), aggressive behavior or homicidal ideation (HI) Denies auditory and visual hallucinations Sensorium and Cognition  Grossly intact Insight  limited Judgment fair Assessment/Plan Psychiatric Diagnoses:  
Patient Active Problem List  
Diagnosis Code  Blister of foot or toe [id number]  DM w/o complication type II ([facility] E11.9  Hypertension I10  
 Penis pain N48.89  
 BPH (benign prostatic hyperplasia) N40.0  Lower urinary tract symptoms (LUTS) R39.9  Elevated prostate specific antigen (PSA) R97.20  Abnormal prostate exam R39.89  
 Schizophrenia ([facility] F20.9 Level of impairment/disability: Severe Leta Maser is a 70 y.o. who is currently admitted for exacerbation of psychosis. 1.  Increase Haldol to 7.5mg bid. If refuses oral, pt to get 5mg IM. 2.  Reviewed instructions, risks, benefits and side effects of medications 3. Disposition/Discharge Date: self-care/home, to be determined. Krista PresidentMD DR. [facility] Psychiatry

## 2018-11-13 LAB — GLUCOSE BLD STRIP.AUTO-MCNC: 189 MG/DL (ref 70–110)

## 2018-11-13 PROCEDURE — 74011250637 HC RX REV CODE- 250/637: Performed by: PSYCHIATRY & NEUROLOGY

## 2018-11-13 PROCEDURE — 82962 GLUCOSE BLOOD TEST: CPT

## 2018-11-13 PROCEDURE — 65220000005 HC RM SEMIPRIVATE PSYCH 3 OR 4 BED

## 2018-11-13 RX ADMIN — FINASTERIDE 5 MG: 5 TABLET, FILM COATED ORAL at 09:00

## 2018-11-13 RX ADMIN — ATORVASTATIN CALCIUM 10 MG: 20 TABLET, FILM COATED ORAL at 22:22

## 2018-11-13 RX ADMIN — PIOGLITAZONE HYDROCHLORIDE 45 MG: 15 TABLET ORAL at 06:37

## 2018-11-13 RX ADMIN — GLIMEPIRIDE 4 MG: 4 TABLET ORAL at 06:37

## 2018-11-13 RX ADMIN — DILTIAZEM HYDROCHLORIDE 120 MG: 120 CAPSULE, COATED, EXTENDED RELEASE ORAL at 09:00

## 2018-11-13 RX ADMIN — DOCUSATE SODIUM 100 MG: 100 CAPSULE, LIQUID FILLED ORAL at 09:00

## 2018-11-13 RX ADMIN — HALOPERIDOL 7.5 MG: 5 TABLET ORAL at 22:23

## 2018-11-13 RX ADMIN — GLIMEPIRIDE 4 MG: 4 TABLET ORAL at 16:51

## 2018-11-13 RX ADMIN — HALOPERIDOL 7.5 MG: 5 TABLET ORAL at 08:58

## 2018-11-13 RX ADMIN — Medication 81 MG: at 09:00

## 2018-11-13 RX ADMIN — LISINOPRIL 20 MG: 20 TABLET ORAL at 08:58

## 2018-11-13 NOTE — BSMART NOTE
SOCIAL WORK GROUP THERAPY PROGRESS NOTE Group Time:  10am 
 
Group Topic:  Coping Skills Group Participation:  
Pt reportedly did not attend group due to medical issues

## 2018-11-13 NOTE — BSMART NOTE
SW assessment/Intervention:  SW made contact with patient as he engaged with others within the milieu. Patient was cooperative and expressed no major concerns. Patient denied SI/HI. Patient denied AVH. Patient was advised to continue with group activities as well as positive peer interaction. SW will continue to monitor patient during hospitalization. COTY Regan, LCSW-E

## 2018-11-13 NOTE — BH NOTES
GROUP THERAPY PROGRESS NOTE Radha Schilling is participating in [place]. Group time: 30 minutes Personal goal for participation: have a good day Goal orientation: community Group therapy participation: passive Therapeutic interventions reviewed and discussed: goals and procedures Impression of participation: encouraged

## 2018-11-13 NOTE — BH NOTES
GROUP THERAPY PROGRESS NOTE Emily Mast is not participating in [employer]. Group time: 30 minutes Personal goal for participation: N/A Goal orientation: relaxation Group therapy participation: Patient chose not to participate Therapeutic interventions reviewed and discussed: Stress management Impression of participation: Staff encouraged patient to participate in group and patient refused.

## 2018-11-13 NOTE — DIABETES MGMT
GLYCEMIC CONTROL PLAN OF CARE Assessment/Recommendations: 
Pt is a 70year old male with a past medical history significant for schizophrenia, diabetes, and hypertension. Blood glucose slightly elevated, noted pt refusing correctional Lispro coverage Continue oral diabetes mediations and monitoring glucose 4 times daily ACHS Most recent blood glucose values: 
11/12/2018 06:36 11/12/2018 11:47 11/12/2018 16:17 11/13/2018 06:30  
259 (H) 329 (H) 183 (H) 189 (H) Current A1C of 7.6% (3/21/18) is equivalent to average blood glucose of 171 mg/dl over the past 2-3 months. Current hospital diabetes medications:  
Correctional Lispro insulin 4 times daily ACHS (normal senstivity) Glimepiride 4 mg two times daily Pioglitazone 45 mg daily before breakfast  
 
Previous day's insulin requirements:  
Glimepiride 4 mg Pioglitazone 30 mg  
 
Home diabetes medications: 
 
Diet:  DIET DIABETIC WITH OPTIONS Consistent Carb 1800kcal; 
 
Education:  Discussed slightly elevated blood glucose with patient, Pt reports he does not like to receive insulin injections. Reports taking pills for his diabetes at home, but unable to name which pills. Pt states he has attended diabetes education classes and regularly visits his doctor. Pt reports having a glucometer at home. Requested another glucometer, will give glucometer to RN to give to patient at discharge. Milan Avon, 64 [address], [address] [facility]

## 2018-11-14 LAB — GLUCOSE BLD STRIP.AUTO-MCNC: 191 MG/DL (ref 70–110)

## 2018-11-14 PROCEDURE — 74011250637 HC RX REV CODE- 250/637: Performed by: PSYCHIATRY & NEUROLOGY

## 2018-11-14 PROCEDURE — 82962 GLUCOSE BLOOD TEST: CPT

## 2018-11-14 PROCEDURE — 65220000005 HC RM SEMIPRIVATE PSYCH 3 OR 4 BED

## 2018-11-14 RX ORDER — INSULIN LISPRO 100 [IU]/ML
INJECTION, SOLUTION INTRAVENOUS; SUBCUTANEOUS
Status: DISCONTINUED | OUTPATIENT
Start: 2018-11-14 | End: 2018-11-20 | Stop reason: HOSPADM

## 2018-11-14 RX ADMIN — FINASTERIDE 5 MG: 5 TABLET, FILM COATED ORAL at 09:00

## 2018-11-14 RX ADMIN — DILTIAZEM HYDROCHLORIDE 120 MG: 120 CAPSULE, COATED, EXTENDED RELEASE ORAL at 08:20

## 2018-11-14 RX ADMIN — Medication 81 MG: at 08:20

## 2018-11-14 RX ADMIN — DOCUSATE SODIUM 100 MG: 100 CAPSULE, LIQUID FILLED ORAL at 08:19

## 2018-11-14 RX ADMIN — GLIMEPIRIDE 4 MG: 4 TABLET ORAL at 08:20

## 2018-11-14 RX ADMIN — ATORVASTATIN CALCIUM 10 MG: 20 TABLET, FILM COATED ORAL at 20:40

## 2018-11-14 RX ADMIN — PIOGLITAZONE HYDROCHLORIDE 45 MG: 15 TABLET ORAL at 08:19

## 2018-11-14 RX ADMIN — GLIMEPIRIDE 4 MG: 4 TABLET ORAL at 17:01

## 2018-11-14 RX ADMIN — HALOPERIDOL 7.5 MG: 5 TABLET ORAL at 08:19

## 2018-11-14 RX ADMIN — LISINOPRIL 20 MG: 20 TABLET ORAL at 08:20

## 2018-11-14 RX ADMIN — HALOPERIDOL 7.5 MG: 5 TABLET ORAL at 20:42

## 2018-11-14 NOTE — PROGRESS NOTES
[address] Inpatient Progress Note Date of Service: 11/13/18 Hospital Day: 6 Subjective/Interval History 11/13/18 Treatment Team Notes:  Notes reviewed and/or discussed and report that Ton Reynoso is a 69 y/o man admitted for exacerbation of Schizophrenia. Per nursing report, pt has mood swings. He can be pleasant one moment and irritable the next. He has been refusing supplemental insulin for elevated blood sugar. Pt seen for assessment. Pt denies complaints. States he slept well and has been eating well. He continues to endorse auditory hallucinations. He says the voices \"tell me all kinds of things\". He has refused to go to groups today. He is tolerating oral medications. Objective Visit Vitals /89 (BP 1 Location: Right arm, BP Patient Position: Sitting) Pulse 88 Temp 96.7 °F (35.9 °C) Resp 12 Ht 5' 9\" (1.753 m) Wt 58.1 kg (128 lb) SpO2 100% BMI 18.90 kg/m² Recent Results (from the past 24 hour(s)) GLUCOSE, POC Collection Time: 11/13/18  6:30 AM  
Result Value Ref Range Glucose (POC) 189 (H) 70 - 110 mg/dL Mental Status Examination Appearance/Hygiene 70 y.o. BLACK OR  male Hygiene: [address] Behavior/Social Relatedness Appropriate Musculoskeletal Gait/Station: appropriate Tone (flaccid, cogwheeling, spastic): normal 
Psychomotor (hyperkinetic, hypokinetic): calm Involuntary movements (tics, dyskinesias, akathisa, stereotypies): none Speech   Rate, rhythm, volume, fluency and articulation are appropriate Mood   euthymic Affect    congruent Thought Process Linear and goal directed Thought Content and Perceptual Disturbances Denies self-injurious behavior (SIB), suicidal ideation (SI), aggressive behavior or homicidal ideation (HI) Denies auditory and visual hallucinations Sensorium and Cognition  Grossly intact Insight  limited Judgment fair Assessment/Plan Psychiatric Diagnoses:  
Patient Active Problem List  
Diagnosis Code  Blister of foot or toe [id number]  DM w/o complication type II ([facility] [address] E11.9  Hypertension I10  
 Penis pain N48.89  
 BPH (benign prostatic hyperplasia) N40.0  Lower urinary tract symptoms (LUTS) R39.9  Elevated prostate specific antigen (PSA) R97.20  Abnormal prostate exam R39.89  
 Schizophrenia ([facility] F20.9 Level of impairment/disability: Severe Daryll Busman is a 70 y.o. who is currently admitted for exacerbation of psychosis. 1.  Continue Haldol to 7.5mg bid. 2.  Reviewed instructions, risks, benefits and side effects of medications 3. Disposition/Discharge Date: self-care/home, to be determined. Viv Mora MD [facility]. [facility]

## 2018-11-14 NOTE — PROGRESS NOTES
Problem: Psychosis Goal: *STG: Decreased delusional thinking Daily, patient will demonstrate improved thought patterns AEB logical and coherent speech. Outcome: Progressing Towards Goal 
Delusional thinking is not as much as before admission. Goal: *STG: Remains safe in hospital 
Daily and every shift patient will contract for safety and will not injure self. Outcome: Progressing Towards Goal 
Patient  Contracts for safety. Denies thoughts of harm to self or others. Goal: *STG/LTG: Complies with medication therapy Daily and every shift, patient will comply with medication regimen. Outcome: Progressing Towards Goal 
Is not compliant with his entire medication regimen. Goal: *STG/LTG: Demonstrates improved thought patterns as evidenced by logical and coherent speech Daily and every shift patient will demonstrate decreased or no hallucinations during hospital stay. Outcome: Progressing Towards Goal 
Audio hallucinations are at a decrease per patient. Comments: The patient is up at will on the unit. He is alert and orientated to time, place and situation. He is not attending groups. His appetite has been veery good. Patient says he is still hearing voices, but not as frequent. He has been in his room most of the day. There have been no behavior issues so far this evening. Will continue to monitor, and will continue to provide support.

## 2018-11-14 NOTE — PROGRESS NOTES
[address] [facility] [address] Inpatient Progress Note Date of Service: 11/14/18 Hospital Day: 7 Subjective/Interval History 11/14/18 Treatment Team Notes:  Notes reviewed and/or discussed and report that Ruben Nvaa is a 69 y/o man admitted for exacerbation of Schizophrenia. Per nursing report, pt has been pleasant and cooperative. Taking meds except for insulin. He has not been attending groups but has been socializing with other peers. He attended the [employer] group this afternoon. Pt seen for assessment. Pt denies complaints. States he slept well and has been eating well. He continues to endorse auditory hallucinations. He says they are decreasing but he hears them throughout the day. Objective Visit Vitals /87 (BP 1 Location: Right arm, BP Patient Position: Sitting) Pulse 100 Temp 97.1 °F (36.2 °C) Resp 18 Ht 5' 9\" (1.753 m) Wt 58.1 kg (128 lb) SpO2 100% BMI 18.90 kg/m² Recent Results (from the past 24 hour(s)) GLUCOSE, POC Collection Time: 11/14/18  6:29 AM  
Result Value Ref Range Glucose (POC) 191 (H) 70 - 110 mg/dL Mental Status Examination Appearance/Hygiene 70 y.o. BLACK OR  male Hygiene: [address] Behavior/Social Relatedness Appropriate Musculoskeletal Gait/Station: appropriate Tone (flaccid, cogwheeling, spastic): normal 
Psychomotor (hyperkinetic, hypokinetic): calm Involuntary movements (tics, dyskinesias, akathisa, stereotypies): none Speech   Rate, rhythm, volume, fluency and articulation are appropriate Mood   euthymic Affect    congruent Thought Process Linear and goal directed Thought Content and Perceptual Disturbances Denies self-injurious behavior (SIB), suicidal ideation (SI), aggressive behavior or homicidal ideation (HI) Denies auditory and visual hallucinations Sensorium and Cognition  Grossly intact Insight  limited Judgment fair Assessment/Plan Psychiatric Diagnoses:  
Patient Active Problem List  
Diagnosis Code  Blister of foot or toe [id number]  DM w/o complication type II ([facility] [address] E11.9  Hypertension I10  
 Penis pain N48.89  
 BPH (benign prostatic hyperplasia) N40.0  Lower urinary tract symptoms (LUTS) R39.9  Elevated prostate specific antigen (PSA) R97.20  Abnormal prostate exam R39.89  
 Schizophrenia ([facility] [address] F20.9 Level of impairment/disability: Severe Eloise Benton is a 70 y.o. who is currently admitted for exacerbation of psychosis. 1.  Continue Haldol to 7.5mg bid. 2.  Reviewed instructions, risks, benefits and side effects of medications 3. Disposition/Discharge Date: self-care/home, to be determined. Sonia Reich MD DR. [facility]

## 2018-11-14 NOTE — BH NOTES
Writer has observed patient's behavior throughout this shift. Patient has been sleeping most of this shift. Patient was sitting out in the dayroom, in the chair,staring at the window. Patient was not responding when staff was questioning him. The patient turned his head around to the staff,only to display a confused looking face, with his eyes wide open, with no respond. Patient has been free of falls and other harms, and staff will continue to monitor patient's safety and behavior for the duration of this shift.

## 2018-11-14 NOTE — BH NOTES
The patient was monitored for safety. Mood was calm. Mostly stayed to himself. Pt did socialize and respond to the staff. No issues with any negative behaviors. Encouraged to focus on treatment. Did not attend any groups or activities. Did take his scheduled medications. Staff will continue to monitor for safety and locations.

## 2018-11-14 NOTE — BH NOTES
GROUP THERAPY PROGRESS NOTE Athshawna Burgos is not participating in [employer]. Group time: 30 minutes Personal goal for participation: none Goal orientation: community Group therapy participation: passive Therapeutic interventions reviewed and discussed: goals and procedures Impression of participation: encouraged

## 2018-11-14 NOTE — BSMART NOTE
SW assessment/Intervention:  SW made contact with patient as he remained in bed. Patient reports he is feeling better. Patient reports he does not want to take his insulin because he does not like the way it makes him feel. SW encouraged patient to consider his health. SW encouraged patient to attend group activities as well as engage in positive peer interaction. SW will continue to monitor patient during hospitalization consulting with treating psychiatrist to complete disposition. COTY Ferguson, LCSW-E

## 2018-11-14 NOTE — PROGRESS NOTES
conducted Spirituality Group for Hilda Garber, who is a 70 y. o.,male. Patients Primary Language is: [place]. According to the patients EMR Yarsani Affiliation is: No preference. The reason the Patient came to the hospital is:  
Patient Active Problem List  
 Diagnosis Date Noted  [facility] 11/07/2018  Abnormal prostate exam 06/22/2017  Elevated prostate specific antigen (PSA) 07/06/2015  BPH (benign prostatic hyperplasia) 05/23/2014  Lower urinary tract symptoms (LUTS) 05/23/2014  Penis pain 09/04/2012  Blister of foot or toe 08/31/2012  DM w/o complication type II ([facility] 08/31/2012  Hypertension 08/31/2012 The  provided the following Interventions: 
Continued the relationship of care and support. Listened empathically. Offered prayer and assurance of continued prayer on patients behalf. Chart reviewed. The following outcomes were achieved: 
Patient expressed gratitude for 's visit. Assessment: 
There are no further spiritual or Gnosticism issues which require Spiritual Care Services interventions at this time. Plan: 
Chaplains will continue to follow and will provide pastoral care on an as needed/requested basis.  recommends bedside caregivers page  on duty if patient shows signs of acute spiritual or emotional distress. Naty Pagan Spiritual Care  
[phone]

## 2018-11-15 LAB
GLUCOSE BLD STRIP.AUTO-MCNC: 207 MG/DL (ref 70–110)
GLUCOSE BLD STRIP.AUTO-MCNC: 296 MG/DL (ref 70–110)

## 2018-11-15 PROCEDURE — 82962 GLUCOSE BLOOD TEST: CPT

## 2018-11-15 PROCEDURE — 65220000005 HC RM SEMIPRIVATE PSYCH 3 OR 4 BED

## 2018-11-15 PROCEDURE — 74011250637 HC RX REV CODE- 250/637: Performed by: PSYCHIATRY & NEUROLOGY

## 2018-11-15 RX ORDER — BENZTROPINE MESYLATE 1 MG/1
0.5 TABLET ORAL 2 TIMES DAILY
Status: DISCONTINUED | OUTPATIENT
Start: 2018-11-15 | End: 2018-11-20 | Stop reason: HOSPADM

## 2018-11-15 RX ORDER — HALOPERIDOL 5 MG/1
10 TABLET ORAL 2 TIMES DAILY
Status: DISCONTINUED | OUTPATIENT
Start: 2018-11-15 | End: 2018-11-20 | Stop reason: HOSPADM

## 2018-11-15 RX ADMIN — LISINOPRIL 20 MG: 20 TABLET ORAL at 08:53

## 2018-11-15 RX ADMIN — DOCUSATE SODIUM 100 MG: 100 CAPSULE, LIQUID FILLED ORAL at 08:53

## 2018-11-15 RX ADMIN — PIOGLITAZONE HYDROCHLORIDE 45 MG: 15 TABLET ORAL at 08:53

## 2018-11-15 RX ADMIN — GLIMEPIRIDE 4 MG: 4 TABLET ORAL at 16:42

## 2018-11-15 RX ADMIN — ATORVASTATIN CALCIUM 10 MG: 20 TABLET, FILM COATED ORAL at 20:31

## 2018-11-15 RX ADMIN — GLIMEPIRIDE 4 MG: 4 TABLET ORAL at 08:53

## 2018-11-15 RX ADMIN — FINASTERIDE 5 MG: 5 TABLET, FILM COATED ORAL at 09:00

## 2018-11-15 RX ADMIN — BENZTROPINE MESYLATE 0.5 MG: 1 TABLET ORAL at 20:29

## 2018-11-15 RX ADMIN — HALOPERIDOL 7.5 MG: 5 TABLET ORAL at 08:54

## 2018-11-15 RX ADMIN — Medication 81 MG: at 08:54

## 2018-11-15 RX ADMIN — DILTIAZEM HYDROCHLORIDE 120 MG: 120 CAPSULE, COATED, EXTENDED RELEASE ORAL at 08:53

## 2018-11-15 RX ADMIN — HALOPERIDOL 10 MG: 5 TABLET ORAL at 20:29

## 2018-11-15 NOTE — BSMART NOTE
OCCUPATIONAL THERAPY PROGRESS NOTE Group Time:  1983 Attendance: The patient attended 3/4 of group. Participation: The patient participated with minimal elaboration in the activity. Attention: The patient was able to focus on the activity. Interaction: The patient acknowledges others or responds to questions,  with no spontaneous interaction. Participated as called on. Abruptly left group without explanation.

## 2018-11-15 NOTE — BH NOTES
Pt's wife (EVAN is signed for) dropped off work related papers that need to be signed by Dr. Kimberly Shi. Papers were put in pt's file.

## 2018-11-15 NOTE — BH NOTES
GROUP THERAPY PROGRESS NOTE Tremayne Flores is not participating in [place]. Group time: 30 minutes Personal goal for participation: none Goal orientation: community Group therapy participation: passive Therapeutic interventions reviewed and discussed: goals and procedures Impression of participation: encouraged

## 2018-11-15 NOTE — BH NOTES
Accucheck taken at 1640 for pt read 297. Pt advised that this is a very high blood glucose and that he should take his sliding scale insulin. Pt refused and when asked again refused again. Will continue to monitor for complications.

## 2018-11-15 NOTE — PROGRESS NOTES
NUTRITION Nursing Referral: [facility] 
 
RECOMMENDATIONS / PLAN:  
 
- Continue current nutrition interventions. - Continue inpatient RD monitoring and evaluation. NUTRITION DIAGNOSIS & INTERVENTIONS:  
 
[x] Meals/snacks: modified composition: 
[x] Medical food supplement therapy: Glucerna Shake BID Nutrition Diagnosis:  Unintended weight loss related to inadequate energy intake as evidenced by pt with 13% weight loss over the last 6 months. Patient meets criteria for Moderate Protein Calorie Malnutrition as evidenced by:  
ASPEN Malnutrition Criteria Acute Illness, Chronic Illness, or Social/Enviornmental: Social/environmental illness Energy Intake: <75% of estimated energy requirement for greater than/equal to 3 months Weight Loss: >10% x 6 month 
ASPEN Malnutrition Score - Social/Environmental Illness: 7 Social/Environmental Illness - Malnutrition Diagnosis: Moderate malnutrition. ASSESSMENT:  
 
11/15: Pt in group therapy during visit. Per nursing pt eating his meals and consuming supplements. Tolerating diet. 11/11: Pt refusing meals earlier in admission, now eating well. Pt reports decrease in quantity of food consumed; was eating 3 meals, then only 2 meals since Feb 2018. States it is 2/2 wife not preparing meals. Pt also states he was fasting x 3 days PTA for Mandaeism purposes. Pt with significant weight loss and is below IBW. Discussed the importance of eating and consuming 3 balanced meals a day, pt receptive. Discussed supplements in house and if needed on discharge, as pt reports consuming them before; encouraged increasing meals first before supplements. Average intake adequate to meet patients estimated nutritional needs:   [x] Yes     [] No      [] Unable to determine at this time Diet: DIET DIABETIC WITH OPTIONS Consistent Carb 1800kcal; Regular DIET NUTRITIONAL SUPPLEMENTS Dinner, Breakfast; [address] Food Allergies: NKFA Current Appetite:   [x] Good     [] Fair     [] Poor     [] Other: 
Appetite/meal intake prior to admission:   [] Good     [x] Fair     [] Poor     [] Other:  
Feeding Limitations:  [] Swallowing Difficulty       [] Chewing Difficulty       [] Other Current Meal Intake: No data found. Gastrointestinal Issues:  [] Yes    [x] No  
Skin Integrity:  WDL Pertinent Medications:  Reviewed: lipitor, SSI Labs:  Reviewed Anthropometrics: 
Ht Readings from Last 1 Encounters:  
11/11/18 5' 9\" (1.753 m) Last 3 Recorded Weights in this Encounter 11/11/18 1229 Weight: 58.1 kg (128 lb) Body mass index is 18.9 kg/m². Weight History: -13.3% x 6 months Weight Metrics 11/11/2018 5/9/2018 6/22/2017 3/23/2017 12/19/2016 11/23/2015 7/13/2015 Weight 128 lb 145 lb 165 lb 150 lb 150 lb 160 lb 160 lb BMI 18.9 kg/m2 21.41 kg/m2 24.37 kg/m2 22.15 kg/m2 22.15 kg/m2 23.62 kg/m2 23.62 kg/m2 Ad[facility] Past Medical History:  
Diagnosis Date  Blister of foot or toe  BPH (benign prostatic hyperplasia)  DM w/o complication type II ([facility]  Elevated prostate specific antigen (PSA)  Hypertension  Lower urinary tract symptoms (LUTS)  Nodular prostate without urinary obstruction  Unspecified disorder of penis Education Needs:        [x] None identified  [] Identified - Not appropriate at this time  []  Identified and addressed - refer to education log Learning Limitations:   [x] None identified  [] Identified Cultural, Cheondoism & ethnic food preferences identified:  [x] None    [] Yes ESTIMATED NUTRITION NEEDS:  
 
8184-9073 kcal (MSJx1.2-1.3), 58-73 gm protein (0.8-1 gm/kg), 1 mL/kcal 
Based on: 73 kg kg       [] Actual BW      [x] IBW MONITORING & EVALUATION:  
 
Nutrition Goal(s): 1. Po intake of meals will meet >75% of patient estimated nutritional needs within the next 7 days.   Outcome:   [x] Met    []  Not Met   [] New/Initial Goal 
 
Monitor:  [x] Food and beverage intake   [x] Diet order   [x] Nutrition-focused physical findings   [] Weight Previous Recommendations (for follow-up assessments only):     [x]   Implemented       []   Not Implemented (RD to address)   [] No Longer Appropriate   [] No Recommendation Made Discharge Planning: diabetic diet [x]  Participated in care planning, discharge planning, & interdisciplinary rounds as appropriate Leyda Bright RD Pager: [phone]

## 2018-11-15 NOTE — BSMART NOTE
ART THERAPY GROUP PROGRESS NOTE PATIENT SCHEDULED FOR GROUP AT: 13:45 ATTENDANCE: Full PARTICIPATION LEVEL: Participates fully in the art process ATTENTION LEVEL: Able to focus on task FOCUS: Goals SYMBOLIC & THEMATIC CONTENT AS NOTED IN IMAGERY: He was calm, compliant, and invested in the task at hand. His associations were concrete and general, however relevant.

## 2018-11-15 NOTE — BSMART NOTE
SW assessment/Intervention:  SW made contact with patient as he is engaged with peers within the milieu. Patient is attentive and pleasant upon approach. Patient reports he is feeling well. It is reported by Dr. Soraida Gaxiola: Today he says there has been no change in intensity or frequency of the hallucinations and they are as prior to admission. He denies SI. Patient expressed no major issues or complaints to this writer. SW will continue to monitor patient during hospitalization. COTY Whalen, LCSW-E

## 2018-11-15 NOTE — BH NOTES
MHT NOTE: The pt has remained isolated in his room appearing to be asleep for the majority of the morning and afternoon. He did come out into the milieu for meals and art therapy. He has not socialized with surrounding staff and pts unless prompted to do so. The pt has complied with taking his medications , and he also has been utilizing the non-skid footwear that was provided for his safety. He did not experience any falls. The pt will continue to be monitored for behavior, location and safety for the remaining duration of the shift.

## 2018-11-15 NOTE — BH NOTES
Pt behavior calm and appropriate with staff and peers. Pt attended all groups and ate all meals. Pt's 1630 blood glucose was 296 however pt refused insulin, even when encouraged. Pt's wife visited during visiting hours and was educated on diabetes management. She also stated pt was pleasant on the phone earlier but was very demanding during her visit. Pt denies SI/HI/AVH at this time, will continue to monitor for safety.

## 2018-11-16 LAB
GLUCOSE BLD STRIP.AUTO-MCNC: 186 MG/DL (ref 70–110)
GLUCOSE BLD STRIP.AUTO-MCNC: 300 MG/DL (ref 70–110)

## 2018-11-16 PROCEDURE — 74011250637 HC RX REV CODE- 250/637: Performed by: PSYCHIATRY & NEUROLOGY

## 2018-11-16 PROCEDURE — 65220000005 HC RM SEMIPRIVATE PSYCH 3 OR 4 BED

## 2018-11-16 PROCEDURE — 82962 GLUCOSE BLOOD TEST: CPT

## 2018-11-16 RX ADMIN — HALOPERIDOL 10 MG: 5 TABLET ORAL at 20:35

## 2018-11-16 RX ADMIN — BENZTROPINE MESYLATE 0.5 MG: 1 TABLET ORAL at 20:36

## 2018-11-16 RX ADMIN — FINASTERIDE 5 MG: 5 TABLET, FILM COATED ORAL at 08:44

## 2018-11-16 RX ADMIN — HALOPERIDOL 10 MG: 5 TABLET ORAL at 08:23

## 2018-11-16 RX ADMIN — BENZTROPINE MESYLATE 0.5 MG: 1 TABLET ORAL at 08:22

## 2018-11-16 RX ADMIN — DOCUSATE SODIUM 100 MG: 100 CAPSULE, LIQUID FILLED ORAL at 08:24

## 2018-11-16 RX ADMIN — PIOGLITAZONE HYDROCHLORIDE 45 MG: 15 TABLET ORAL at 08:24

## 2018-11-16 RX ADMIN — DILTIAZEM HYDROCHLORIDE 120 MG: 120 CAPSULE, COATED, EXTENDED RELEASE ORAL at 08:23

## 2018-11-16 RX ADMIN — GLIMEPIRIDE 4 MG: 4 TABLET ORAL at 17:02

## 2018-11-16 RX ADMIN — GLIMEPIRIDE 4 MG: 4 TABLET ORAL at 08:24

## 2018-11-16 RX ADMIN — Medication 81 MG: at 08:24

## 2018-11-16 RX ADMIN — LISINOPRIL 20 MG: 20 TABLET ORAL at 08:24

## 2018-11-16 RX ADMIN — ATORVASTATIN CALCIUM 10 MG: 20 TABLET, FILM COATED ORAL at 20:35

## 2018-11-16 NOTE — BSMART NOTE
SW assessment/Intervention:  SW made contact with patient as he engaged within the milieu. Patient is pleasant and expressed no major concerns. SW encouraged patient to attend group activities as well as positive interaction with peers. SW will continue to monitor patient during hospitalization. COTY Meier, LCSW-E

## 2018-11-16 NOTE — BH NOTES
GROUP THERAPY PROGRESS NOTE Roderick Soriano is not participating in Recreational Therapy. Group time: 45 minutes Personal goal for participation: to increase social strengthening Goal orientation: social 
 
Group therapy participation: inactive Therapeutic interventions reviewed and discussed: staff review social skill building with pt Impression of participation: pt inactively engaged in social activity

## 2018-11-16 NOTE — BH NOTES
Eats and tolerates evening meal. Refuses insulin at dinnertime. Offers no complaints. Interacts with staff and peers minimally. Stays to self in room. Quiet. Withdrawn. Angry. Quiet. Anxious. Gripper socks and 15 minute checks in place for safety. Gait appropriate. Will continue to monitor and support.

## 2018-11-16 NOTE — BH NOTES
GROUP THERAPY PROGRESS NOTE Fady Casas is not participating in Recreational Therapy. Group time: 30 minutes Personal goal for participation: N/A Goal orientation: community Group therapy participation: Patient chose not to participate in group Therapeutic interventions reviewed and discussed: Importance of physical activity Impression of participation: Staff encouraged patient to participate in group, but patient refused.

## 2018-11-16 NOTE — PROGRESS NOTES
Behavioral Health Progress Note Admit Date: 11/7/2018 Hospital day 8 Vitals : No data found. Labs:   
Recent Results (from the past 24 hour(s)) GLUCOSE, POC Collection Time: 11/16/18  6:52 AM  
Result Value Ref Range Glucose (POC) 186 (H) 70 - 110 mg/dL GLUCOSE, POC Collection Time: 11/16/18  4:58 PM  
Result Value Ref Range Glucose (POC) 300 (H) 70 - 110 mg/dL Meds:  
Current Facility-Administered Medications Medication Dose Route Frequency  haloperidol (HALDOL) tablet 10 mg  10 mg Oral BID  
 benztropine (COGENTIN) tablet 0.5 mg  0.5 mg Oral BID  insulin lispro (HUMALOG) injection   SubCUTAneous ACB&D  
 haloperidol lactate (HALDOL) injection 5 mg  5 mg IntraMUSCular BID PRN  
 LORazepam (ATIVAN) tablet 1 mg  1 mg Oral Q4H PRN  
 haloperidol (HALDOL) tablet 2 mg  2 mg Oral Q4H PRN  
 haloperidol lactate (HALDOL) injection 2 mg  2 mg IntraMUSCular Q4H PRN  
 ibuprofen (MOTRIN) tablet 400 mg  400 mg Oral Q4H PRN  
 LORazepam (ATIVAN) injection 1 mg  1 mg IntraMUSCular Q4H PRN  
 traZODone (DESYREL) tablet 50 mg  50 mg Oral QHS PRN  
 glucose chewable tablet 16 g  16 g Oral PRN  
 glucagon (GLUCAGEN) injection 1 mg  1 mg IntraMUSCular PRN  
 aspirin chewable tablet 81 mg  81 mg Oral DAILY  atorvastatin (LIPITOR) tablet 10 mg  10 mg Oral QHS  docusate sodium (COLACE) capsule 100 mg  100 mg Oral DAILY  finasteride (PROSCAR) tablet 5 mg  5 mg Oral DAILY  glimepiride (AMARYL) tablet 4 mg  4 mg Oral ACB&D  
 dilTIAZem CD (CARDIZEM CD) capsule 120 mg  120 mg Oral DAILY  lisinopril (PRINIVIL, ZESTRIL) tablet 20 mg  20 mg Oral DAILY  loratadine (CLARITIN) tablet 10 mg  10 mg Oral DAILY PRN  pioglitazone (ACTOS) tablet 45 mg  45 mg Oral ACB Hospital Problems: Principal Problem: 
  Schizophrenia ([facility].) (11/7/2018) Active Problems: 
  DM w/o complication type II ([facility].) (8/31/2012) Hypertension (8/31/2012) Subjective: Medication side effects: fatigue/weakness 
dry mouth Mental Status Exam 
Sensorium: alert Orientation: only aware of  place and person Relations: evasive, passive and uncooperative Eye Contact: poor Appearance: is unkempt Thought Process: slow rate of thoughts and poor abstract reasoning/computation Thought Content: paranoia Suicidal: none Homicidal: none Mood: is irritable Affect: constricted Memory: is impaired Concentration: distractable Abstraction: concrete Insight: The patient shows little insight OR Poor Judgement: is psychologically impaired OR  Poor Assessment/Plan:  
not changed Continue close observation, continue w/ increased dose of Haldol 10 mg hs, reality orient.

## 2018-11-16 NOTE — PROGRESS NOTES
Problem: Psychosis Goal: *STG: Remains safe in hospital 
Daily and every shift patient will contract for safety and will not injure self. Outcome: Progressing Towards Goal 
Pt progressing towards goal aeb heather for safety this shift. Goal: *STG/LTG: Complies with medication therapy Daily and every shift, patient will comply with medication regimen. Outcome: Not Progressing Towards Goal 
Pt not progressing towards goal aeb refusing insulin with a 296 blood glucose.

## 2018-11-16 NOTE — PROGRESS NOTES
Problem: Psychosis Goal: *STG: Remains safe in hospital 
Daily and every shift patient will contract for safety and will not injure self. Outcome: Progressing Towards Goal 
Pt free from falls and agrees to contract for safety this shift. Goal: *STG/LTG: Complies with medication therapy Daily and every shift, patient will comply with medication regimen. Outcome: Not Progressing Towards Goal 
Pt not progressing towards goal aeb refusing insulin this AM.

## 2018-11-16 NOTE — BH NOTES
GROUP THERAPY PROGRESS NOTE Roderick Soriano is not participating in [place]. Group time: 30 minutes Personal goal for participation: none Goal orientation: community Group therapy participation: passive Therapeutic interventions reviewed and discussed: goals and procedures Impression of participation: encouraged

## 2018-11-17 LAB
GLUCOSE BLD STRIP.AUTO-MCNC: 174 MG/DL (ref 70–110)
GLUCOSE BLD STRIP.AUTO-MCNC: 212 MG/DL (ref 70–110)

## 2018-11-17 PROCEDURE — 65220000005 HC RM SEMIPRIVATE PSYCH 3 OR 4 BED

## 2018-11-17 PROCEDURE — 82962 GLUCOSE BLOOD TEST: CPT

## 2018-11-17 PROCEDURE — 74011000250 HC RX REV CODE- 250: Performed by: PSYCHIATRY & NEUROLOGY

## 2018-11-17 PROCEDURE — 74011250637 HC RX REV CODE- 250/637: Performed by: PSYCHIATRY & NEUROLOGY

## 2018-11-17 RX ORDER — POLYETHYLENE GLYCOL 3350 17 G/17G
17 POWDER, FOR SOLUTION ORAL DAILY
Status: DISCONTINUED | OUTPATIENT
Start: 2018-11-18 | End: 2018-11-17

## 2018-11-17 RX ORDER — POLYETHYLENE GLYCOL 3350 17 G/17G
17 POWDER, FOR SOLUTION ORAL DAILY
Status: DISCONTINUED | OUTPATIENT
Start: 2018-11-17 | End: 2018-11-20 | Stop reason: HOSPADM

## 2018-11-17 RX ADMIN — ATORVASTATIN CALCIUM 10 MG: 20 TABLET, FILM COATED ORAL at 20:38

## 2018-11-17 RX ADMIN — FINASTERIDE 5 MG: 5 TABLET, FILM COATED ORAL at 08:57

## 2018-11-17 RX ADMIN — HALOPERIDOL 10 MG: 5 TABLET ORAL at 08:16

## 2018-11-17 RX ADMIN — PIOGLITAZONE HYDROCHLORIDE 45 MG: 15 TABLET ORAL at 08:17

## 2018-11-17 RX ADMIN — BENZTROPINE MESYLATE 0.5 MG: 1 TABLET ORAL at 08:16

## 2018-11-17 RX ADMIN — DOCUSATE SODIUM 100 MG: 100 CAPSULE, LIQUID FILLED ORAL at 08:16

## 2018-11-17 RX ADMIN — DILTIAZEM HYDROCHLORIDE 120 MG: 120 CAPSULE, COATED, EXTENDED RELEASE ORAL at 08:17

## 2018-11-17 RX ADMIN — GLIMEPIRIDE 4 MG: 4 TABLET ORAL at 08:17

## 2018-11-17 RX ADMIN — POLYETHYLENE GLYCOL 3350 17 G: 17 POWDER, FOR SOLUTION ORAL at 14:09

## 2018-11-17 RX ADMIN — BENZTROPINE MESYLATE 0.5 MG: 1 TABLET ORAL at 20:37

## 2018-11-17 RX ADMIN — GLIMEPIRIDE 4 MG: 4 TABLET ORAL at 15:38

## 2018-11-17 RX ADMIN — HALOPERIDOL 10 MG: 5 TABLET ORAL at 20:38

## 2018-11-17 RX ADMIN — Medication 81 MG: at 08:17

## 2018-11-17 RX ADMIN — LISINOPRIL 20 MG: 20 TABLET ORAL at 08:17

## 2018-11-17 NOTE — PROGRESS NOTES
Problem: Psychosis Goal: *STG: Remains safe in hospital 
Daily and every shift patient will contract for safety and will not injure self. Outcome: Progressing Towards Goal 
Progressing towards goal aeb pt agrees to contract for safety this shift. Problem: Diabetes Self-Management Goal: *Prevention, detection and treatment of chronic complications Define the natural course of diabetes and describe the relationship of blood glucose levels to long term complications of diabetes. Outcome: Not Progressing Towards Goal 
Pt not progressing towards goal aeb refusing insulin despite glucose of 212. Pt behavior calm and appropriate with staff and peers. Pt spends the whole day apart from meals sleeping in bed. Pt compliant with all scheduled medications. PT c/o constipation and asked for a \"mild laxative. \" Pt received verbal order from Dr. Lor Landin for Mirilax. Pt refused insulin even though blood sugar was 212. Pt's wife called and spoke with this RN to get an update of how pt has been doing, wife informed of noncompliance with insulin therapy. Pt denies SI/HI/AVH at this time, will continue to monitor.

## 2018-11-17 NOTE — BH NOTES
GROUP THERAPY PROGRESS NOTE Geovanna Matson is participating in [place]. Group time: 30 minutes Personal goal for participation: none Goal orientation: personal 
 
Group therapy participation: minimal 
 
Therapeutic interventions reviewed and discussed: Discussed an practiced therapeutic power of quiet. Beneficial because it allows for a calm state and the ability to unpack traumas. Impression of participation: listening

## 2018-11-17 NOTE — BH NOTES
MHT Note: Patient interacts with staff and peers minimally. Pt very quiet and keeps to himself this shift. Pt had no visitors today. Pt ate all meals on shift. Pt reports no new pain or symptoms and was asked to inform care team of any changes in condition, pt agreed. Pt spent much of shift in bed resting. Staff will continue to monitor pt for safety, behavior and location.

## 2018-11-17 NOTE — PROGRESS NOTES
Yodit Griffith denies feeling depressed or SI, but endorses AH: \"the food isn't good, don't eat certain foods. \" Insight is poor, he doesn't know why he's here. Assessment: Schizophrenia (F20.9) Plan: Continue current treatment plan. Travis Hayes MD 
Psychiatry

## 2018-11-17 NOTE — BH NOTES
GROUP THERAPY PROGRESS NOTE Willian Parker was asked to participate in [id number] Discharge Planning group and declined

## 2018-11-18 LAB
GLUCOSE BLD STRIP.AUTO-MCNC: 188 MG/DL (ref 70–110)
GLUCOSE BLD STRIP.AUTO-MCNC: 236 MG/DL (ref 70–110)

## 2018-11-18 PROCEDURE — 74011000250 HC RX REV CODE- 250: Performed by: PSYCHIATRY & NEUROLOGY

## 2018-11-18 PROCEDURE — 82962 GLUCOSE BLOOD TEST: CPT

## 2018-11-18 PROCEDURE — 65220000005 HC RM SEMIPRIVATE PSYCH 3 OR 4 BED

## 2018-11-18 PROCEDURE — 74011250637 HC RX REV CODE- 250/637: Performed by: PSYCHIATRY & NEUROLOGY

## 2018-11-18 RX ADMIN — DOCUSATE SODIUM 100 MG: 100 CAPSULE, LIQUID FILLED ORAL at 08:58

## 2018-11-18 RX ADMIN — Medication 81 MG: at 08:58

## 2018-11-18 RX ADMIN — FINASTERIDE 5 MG: 5 TABLET, FILM COATED ORAL at 09:00

## 2018-11-18 RX ADMIN — GLIMEPIRIDE 4 MG: 4 TABLET ORAL at 16:48

## 2018-11-18 RX ADMIN — HALOPERIDOL 10 MG: 5 TABLET ORAL at 08:59

## 2018-11-18 RX ADMIN — BENZTROPINE MESYLATE 0.5 MG: 1 TABLET ORAL at 08:59

## 2018-11-18 RX ADMIN — ATORVASTATIN CALCIUM 10 MG: 20 TABLET, FILM COATED ORAL at 20:43

## 2018-11-18 RX ADMIN — DILTIAZEM HYDROCHLORIDE 120 MG: 120 CAPSULE, COATED, EXTENDED RELEASE ORAL at 08:58

## 2018-11-18 RX ADMIN — LISINOPRIL 20 MG: 20 TABLET ORAL at 08:58

## 2018-11-18 RX ADMIN — PIOGLITAZONE HYDROCHLORIDE 45 MG: 15 TABLET ORAL at 08:58

## 2018-11-18 RX ADMIN — HALOPERIDOL 10 MG: 5 TABLET ORAL at 20:43

## 2018-11-18 RX ADMIN — GLIMEPIRIDE 4 MG: 4 TABLET ORAL at 08:58

## 2018-11-18 RX ADMIN — POLYETHYLENE GLYCOL 3350 17 G: 17 POWDER, FOR SOLUTION ORAL at 09:11

## 2018-11-18 RX ADMIN — BENZTROPINE MESYLATE 0.5 MG: 1 TABLET ORAL at 20:44

## 2018-11-18 NOTE — BH NOTES
GROUP THERAPY PROGRESS NOTE Yanelis Hines is participating in [place]. Group time: 30 minutes Personal goal for participation: verify insight and reality orientation; discuss unit issues and guideline compliance Goal orientation: personal 
 
Group therapy participation: minimal

## 2018-11-18 NOTE — PROGRESS NOTES
Merary Carter denies feeling depressed or SI, but endorses AH: \"they tell me not to eat certain foods. \" Insight is poor, he doesn't know why he's here. 
  
Assessment: Schizophrenia (F20.9) 
  
Plan: Continue current treatment plan. 
  
Travis Bartholomew MD 
Psychiatry

## 2018-11-18 NOTE — PROGRESS NOTES
Problem: Psychosis Goal: *STG: Remains safe in hospital 
Daily and every shift patient will contract for safety and will not injure self. Outcome: Progressing Towards Goal 
Patient has remained free of harm to self and others. Goal: *STG/LTG: Complies with medication therapy Daily and every shift, patient will comply with medication regimen. Outcome: Progressing Towards Goal 
Patient has been compliant with prescribed medications. Comments: Patient has been in bed since breakfast this morning and reported feeling \"fine\" and stated he slept \"pretty good, I was awake part of the morning. That's just the way I am when I sleep\". Patient has bene calm and cooperative upon approach. Patient has been compliant with prescribed medications. Patient was educated regarding compliance with diabetic protocols. Patient denies SI/HI, with no safety issues noted. Denies A/VH, Will monitor for safety with support as needed throughout treatment regimen.

## 2018-11-18 NOTE — BH NOTES
Patient paced around dayroom very briefly during the night. Able to go back to sleep with no redirection. Slept for about 7 hours.

## 2018-11-18 NOTE — BH NOTES
GROUP THERAPY PROGRESS NOTE Leann Bernal is participating in Relaxation Group time: 30 Minutes Personal goal for participation: Bringing Relaxation A Purpose Of Improving A Person's Mental Health. Goal orientation: Social 
 
Group therapy participation: Active Therapeutic interventions reviewed and discussed: How relaxation is defined and how it can manage stress and anxiety. How relaxation reduces stress, plus the symptoms of mental health conditions, like depression, anxiety, and schizophrenia, and ways to include relaxation in a person's life. Impression of participation: Person fully participated.

## 2018-11-19 LAB — GLUCOSE BLD STRIP.AUTO-MCNC: 233 MG/DL (ref 70–110)

## 2018-11-19 PROCEDURE — 74011250637 HC RX REV CODE- 250/637: Performed by: PSYCHIATRY & NEUROLOGY

## 2018-11-19 PROCEDURE — 74011000250 HC RX REV CODE- 250: Performed by: PSYCHIATRY & NEUROLOGY

## 2018-11-19 PROCEDURE — 82962 GLUCOSE BLOOD TEST: CPT

## 2018-11-19 PROCEDURE — 65220000005 HC RM SEMIPRIVATE PSYCH 3 OR 4 BED

## 2018-11-19 RX ORDER — LANOLIN ALCOHOL/MO/W.PET/CERES
6 CREAM (GRAM) TOPICAL
Status: DISCONTINUED | OUTPATIENT
Start: 2018-11-19 | End: 2018-11-20 | Stop reason: HOSPADM

## 2018-11-19 RX ADMIN — FINASTERIDE 5 MG: 5 TABLET, FILM COATED ORAL at 09:21

## 2018-11-19 RX ADMIN — POLYETHYLENE GLYCOL 3350 17 G: 17 POWDER, FOR SOLUTION ORAL at 09:21

## 2018-11-19 RX ADMIN — GLIMEPIRIDE 4 MG: 4 TABLET ORAL at 09:18

## 2018-11-19 RX ADMIN — DOCUSATE SODIUM 100 MG: 100 CAPSULE, LIQUID FILLED ORAL at 09:19

## 2018-11-19 RX ADMIN — GLIMEPIRIDE 4 MG: 4 TABLET ORAL at 16:50

## 2018-11-19 RX ADMIN — Medication 81 MG: at 09:19

## 2018-11-19 RX ADMIN — DILTIAZEM HYDROCHLORIDE 120 MG: 120 CAPSULE, COATED, EXTENDED RELEASE ORAL at 09:18

## 2018-11-19 RX ADMIN — HALOPERIDOL 10 MG: 5 TABLET ORAL at 09:18

## 2018-11-19 RX ADMIN — PIOGLITAZONE HYDROCHLORIDE 45 MG: 15 TABLET ORAL at 09:18

## 2018-11-19 RX ADMIN — LISINOPRIL 20 MG: 20 TABLET ORAL at 09:18

## 2018-11-19 RX ADMIN — BENZTROPINE MESYLATE 0.5 MG: 1 TABLET ORAL at 09:19

## 2018-11-19 NOTE — PROGRESS NOTES
Problem: Psychosis Goal: *STG: Remains safe in hospital 
Daily and every shift patient will contract for safety and will not injure self. Outcome: Progressing Towards Goal 
No attempts to harm self or others Goal: *STG/LTG: Complies with medication therapy Daily and every shift, patient will comply with medication regimen. Outcome: Progressing Towards Goal 
Compliant Problem: Diabetes Self-Management Goal: *Disease process and treatment process Define diabetes and identify own type of diabetes; list 3 options for treating diabetes. Outcome: Progressing Towards Goal 
Compliant with accu checks diet and oral medications but refuses insulin coverage Problem: Falls - Risk of 
Goal: *Absence of Falls Document Medardo Rowan Fall Risk and appropriate interventions in the flowsheet. Daily patient will remain free from falls. Outcome: Progressing Towards Goal 
Fall Risk Interventions: 
  
 
  
 
Medication Interventions: Teach patient to arise slowly Comments: Pt is mostly isolating in bed. He is not interested in one to one with this nurse. Pt was resistant to morning meds stating he already took his medications. This nurse explained to pt he had not had his morning medications and he did come out and take his medications. Pt has made no attempts to harm self or others. He is irritable today and not participating in groups.

## 2018-11-19 NOTE — PROGRESS NOTES
Behavioral Health Progress Note Admit Date: 11/7/2018 Hospital day 12 Vitals :  
Patient Vitals for the past 8 hrs: 
 BP Temp Pulse Resp  
11/19/18 0759 137/87 97.1 °F (36.2 °C) 91 18 Labs:   
Recent Results (from the past 24 hour(s)) GLUCOSE, POC Collection Time: 11/18/18  3:50 PM  
Result Value Ref Range Glucose (POC) 236 (H) 70 - 110 mg/dL GLUCOSE, POC Collection Time: 11/19/18  6:34 AM  
Result Value Ref Range Glucose (POC) 233 (H) 70 - 110 mg/dL Meds:  
Current Facility-Administered Medications Medication Dose Route Frequency  polyethylene glycol (MIRALAX) packet 17 g  17 g Oral DAILY  haloperidol (HALDOL) tablet 10 mg  10 mg Oral BID  
 benztropine (COGENTIN) tablet 0.5 mg  0.5 mg Oral BID  insulin lispro (HUMALOG) injection   SubCUTAneous ACB&D  
 haloperidol lactate (HALDOL) injection 5 mg  5 mg IntraMUSCular BID PRN  
 LORazepam (ATIVAN) tablet 1 mg  1 mg Oral Q4H PRN  
 haloperidol (HALDOL) tablet 2 mg  2 mg Oral Q4H PRN  
 haloperidol lactate (HALDOL) injection 2 mg  2 mg IntraMUSCular Q4H PRN  
 ibuprofen (MOTRIN) tablet 400 mg  400 mg Oral Q4H PRN  
 LORazepam (ATIVAN) injection 1 mg  1 mg IntraMUSCular Q4H PRN  
 traZODone (DESYREL) tablet 50 mg  50 mg Oral QHS PRN  
 glucose chewable tablet 16 g  16 g Oral PRN  
 glucagon (GLUCAGEN) injection 1 mg  1 mg IntraMUSCular PRN  
 aspirin chewable tablet 81 mg  81 mg Oral DAILY  atorvastatin (LIPITOR) tablet 10 mg  10 mg Oral QHS  docusate sodium (COLACE) capsule 100 mg  100 mg Oral DAILY  finasteride (PROSCAR) tablet 5 mg  5 mg Oral DAILY  glimepiride (AMARYL) tablet 4 mg  4 mg Oral ACB&D  
 dilTIAZem CD (CARDIZEM CD) capsule 120 mg  120 mg Oral DAILY  lisinopril (PRINIVIL, ZESTRIL) tablet 20 mg  20 mg Oral DAILY  loratadine (CLARITIN) tablet 10 mg  10 mg Oral DAILY PRN  pioglitazone (ACTOS) tablet 45 mg  45 mg Oral ACB Hospital Problems: Principal Problem: Schizophrenia ([facility].) (11/7/2018) Active Problems: 
  DM w/o complication type II ([facility].) (8/31/2012) Hypertension (8/31/2012) Subjective:  
Medication side effects: dizziness/lightheadedness 
none Voices are less. Feels a little lightheaded on arising, cannot increase med further, does not want to cut Will get up slow. Some sleep problem, order for Melatonin Mental Status Exam 
Sensorium: alert Orientation: only aware of  time, place and person Relations: guarded Eye Contact: intense Appearance: is bizarre Thought Process: slow rate of thoughts and poor abstract reasoning/computation Thought Content: auditry halluc \"Dont eat that food>\" Suicidal: none Homicidal: none Mood: is depressed Affect: depressed Memory: shows no evidence of impairment Concentration: distractable Abstraction: concrete Insight: The patient shows little insight OR Fair Judgement: is psychologically impaired OR  Fair Assessment/Plan:  
improved Continue close observation,

## 2018-11-19 NOTE — BSMART NOTE
SW assessment/Intervention:  SW made contact with patient as he remained in bed. Patient continues to display flat affect. Patient reports he is feeling well and denies feelings of suicide. Patient expressed no major issues or concerns. Patient was encouraged to attend group activities as well as engage in positive peer interaction. SW will continue to monitor patient during hospitalization consulting with treating psychiatrist to complete disposition. COTY Mclaughlin, LCSW-E

## 2018-11-19 NOTE — BH NOTES
GROUP THERAPY PROGRESS NOTE Kathleen Ramírez is not participating in Recreational Therapy despite staff encouagement.

## 2018-11-19 NOTE — BSMART NOTE
ART THERAPY GROUP PROGRESS NOTE Group time:11:15 The patient did not awaken/get up when called for group.

## 2018-11-19 NOTE — BH NOTES
Patient has been compliant with prescribed medications however continues to refuse insulin coverage despite education and encouragement towards treatment compliance. Patient has not been irritable or agitated this shift. Patient has been calm and cooperative upon approach. Patient has received visit from spouse with verbalization of satisfaction with visit.

## 2018-11-19 NOTE — BSMART NOTE
SOCIAL WORK GROUP THERAPY PROGRESS NOTE Group Time:  10am 
 
Group Topic:  Coping Skills Group Participation:  
 
Pt remained in bed & expressed not interested in attending session despite staff support.

## 2018-11-20 VITALS
TEMPERATURE: 97.1 F | OXYGEN SATURATION: 100 % | HEART RATE: 89 BPM | RESPIRATION RATE: 16 BRPM | BODY MASS INDEX: 18.96 KG/M2 | SYSTOLIC BLOOD PRESSURE: 121 MMHG | WEIGHT: 128 LBS | HEIGHT: 69 IN | DIASTOLIC BLOOD PRESSURE: 85 MMHG

## 2018-11-20 LAB — GLUCOSE BLD STRIP.AUTO-MCNC: 175 MG/DL (ref 70–110)

## 2018-11-20 PROCEDURE — 74011000250 HC RX REV CODE- 250: Performed by: PSYCHIATRY & NEUROLOGY

## 2018-11-20 PROCEDURE — 82962 GLUCOSE BLOOD TEST: CPT

## 2018-11-20 PROCEDURE — 74011250637 HC RX REV CODE- 250/637: Performed by: PSYCHIATRY & NEUROLOGY

## 2018-11-20 RX ORDER — LANOLIN ALCOHOL/MO/W.PET/CERES
6 CREAM (GRAM) TOPICAL
Qty: 30 TAB | Refills: 0 | Status: SHIPPED | OUTPATIENT
Start: 2018-11-20 | End: 2019-05-08

## 2018-11-20 RX ORDER — PIOGLITAZONEHYDROCHLORIDE 30 MG/1
45 TABLET ORAL DAILY
Qty: 30 TAB | Refills: 0 | Status: SHIPPED | OUTPATIENT
Start: 2018-11-20 | End: 2018-12-18

## 2018-11-20 RX ORDER — HALOPERIDOL 10 MG/1
10 TABLET ORAL 2 TIMES DAILY
Qty: 60 TAB | Refills: 0 | Status: SHIPPED | OUTPATIENT
Start: 2018-11-20 | End: 2019-01-07 | Stop reason: ALTCHOICE

## 2018-11-20 RX ORDER — BENZTROPINE MESYLATE 0.5 MG/1
0.5 TABLET ORAL 2 TIMES DAILY
Qty: 60 TAB | Refills: 0 | Status: SHIPPED | OUTPATIENT
Start: 2018-11-20 | End: 2019-05-08

## 2018-11-20 RX ADMIN — BENZTROPINE MESYLATE 0.5 MG: 1 TABLET ORAL at 08:52

## 2018-11-20 RX ADMIN — POLYETHYLENE GLYCOL 3350 17 G: 17 POWDER, FOR SOLUTION ORAL at 09:00

## 2018-11-20 RX ADMIN — Medication 81 MG: at 08:58

## 2018-11-20 RX ADMIN — DOCUSATE SODIUM 100 MG: 100 CAPSULE, LIQUID FILLED ORAL at 08:52

## 2018-11-20 RX ADMIN — PIOGLITAZONE HYDROCHLORIDE 45 MG: 15 TABLET ORAL at 08:52

## 2018-11-20 RX ADMIN — HALOPERIDOL 10 MG: 5 TABLET ORAL at 08:53

## 2018-11-20 RX ADMIN — GLIMEPIRIDE 4 MG: 4 TABLET ORAL at 08:52

## 2018-11-20 RX ADMIN — DILTIAZEM HYDROCHLORIDE 120 MG: 120 CAPSULE, COATED, EXTENDED RELEASE ORAL at 08:52

## 2018-11-20 RX ADMIN — FINASTERIDE 5 MG: 5 TABLET, FILM COATED ORAL at 08:54

## 2018-11-20 RX ADMIN — LISINOPRIL 20 MG: 20 TABLET ORAL at 08:52

## 2018-11-20 NOTE — BH NOTES
GROUP THERAPY PROGRESS NOTE Hilda Garber is participating in Positive thoughts. Group time: 30 minutes Personal goal for participation: Reflection/Relaxation Goal orientation: relaxation Group therapy participation: passive Therapeutic interventions reviewed and discussed: Staff encouraged patient to think positively to relieve stress. Impression of participation: Patient chose not to participate.

## 2018-11-20 NOTE — BH NOTES
Patient received schedule 2100 medications Lipitor, Cogentin, Haldol, Melatonin will continue to monitor.

## 2018-11-20 NOTE — PROGRESS NOTES
Problem: Psychosis Goal: *STG: Decreased delusional thinking Daily, patient will demonstrate improved thought patterns AEB logical and coherent speech. Outcome: Progressing Towards Goal 
Not expressing any delusions at this time. Goal: *STG: Remains safe in hospital 
Daily and every shift patient will contract for safety and will not injure self. Outcome: Progressing Towards Goal 
No attempts to harm self or others Goal: *STG/LTG: Complies with medication therapy Daily and every shift, patient will comply with medication regimen. Outcome: Progressing Towards Goal 
Compliant with medications this morning Problem: Diabetes Self-Management Goal: *Disease process and treatment process Define diabetes and identify own type of diabetes; list 3 options for treating diabetes. Outcome: Progressing Towards Goal 
Pt is compliant with accu checks diet and oral medications but refuses insulin coverage. Problem: Falls - Risk of 
Goal: *Absence of Falls Document Perlita Shadow Fall Risk and appropriate interventions in the flowsheet. Daily patient will remain free from falls. Outcome: Progressing Towards Goal 
Fall Risk Interventions: 
  
 
  
 
Medication Interventions: Teach patient to arise slowly Comments: Pt continues to isolate in his room. He denies any thoughts of harming self or others. Pt verbalized to MD that he is still hearing voices. Pt does not participate in groups but was compliant with all morning medications today. He was very pleasant with nurses this morning smiling and laughing.

## 2018-11-20 NOTE — BH NOTES
Pt was in no visible distress at time of discharge. Pt denied si/hi and avh. Pt's dishcharge instructions were reviewed with pt, including follow-up appt, medications, and mental health hotline phone numbers. Pt signed and verbally acknowledged understanding of discharge instructions. Pt was escorted to the [address] [place] [address] lobby with his belongings; pt's wife was waiting for pt. Pt's wife received pt's FMLA papers. Pt left BMS with his wife.

## 2018-11-20 NOTE — BSMART NOTE
SW assessment/Intervention:  Family Session via phone contact:  SW made contact with patients wife ( Mrs. Latesha Ramos) to address discharge process. Wife reports patient appears to be at baseline. She reports patient has displayed this affect for the past 20 years. She reports he a 'loner\" and often spends time in the den. She reports she has the ability to know when patients medication requires adjusting. Mrs. Latesha Ramos reports a past hospitalization for patient about 10 years ago. She reports he has managed to remain compliant for several years however, noticed a change in his behavior over the past several months. Mrs. Latesha Ramos reports she will ensure patient attends follow up appointment and will remain consistent with taking prescribed medications. SW will complete disposition preparing patient for discharge. COTY Holder, LCSW-E

## 2018-11-20 NOTE — DISCHARGE INSTRUCTIONS
BEHAVIORAL HEALTH NURSING DISCHARGE NOTE      The following personal items collected during your admission are returned to you:   Dental Appliance: Dental Appliances: None  Vision: Visual Aid: None  Hearing Aid:    Jewelry: Jewelry: None  Clothing: Clothing: (2 Pajama Pants,2Pajama Shirts,2 Pr. Socks,3 Pants,4 Shirts, )  Other Valuables: Other Valuables: None  Valuables sent to safe: Personal Items Sent to Safe: None      PATIENT INSTRUCTIONS:      Follow-up with Pt will return to see Dr Darrian Valencia at [address] [facility] [id number]. .. [address] [facility], [address]  on. .. 18  at  6:15pm.    Numbers to remember [facility] Desk [facility] Emergency Services [phone] Suicide Prevention [phone](talk)    The discharge information has been reviewed with the patient. The patient verbalized understanding. [employer] Activation    Thank you for requesting access to [employer]. Please follow the instructions below to securely access and download your online medical record. [employer] allows you to send messages to your doctor, view your test results, renew your prescriptions, schedule appointments, and more. How Do I Sign Up? 1. In your internet browser, go to [email or web address]  2. Click on the First Time User? Click Here link in the Sign In box. You will be redirect to the New Member Sign Up page. 3. Enter your [employer] Access Code exactly as it appears below. You will not need to use this code after youve completed the sign-up process. If you do not sign up before the expiration date, you must request a new code. [employer] Access Code: [email or web address]  Expires: 2019  2:30 PM (This is the date your [employer] access code will )    4. Enter the last four digits of your Social Security Number (xxxx) and Date of Birth (mm/dd/yyyy) as indicated and click Submit. You will be taken to the next sign-up page. 5. Create a [employer] ID.  This will be your [employer] login ID and cannot be changed, so think of one that is secure and easy to remember. 6. Create a [facility] password. You can change your password at any time. 7. Enter your Password Reset Question and Answer. This can be used at a later time if you forget your password. 8. Enter your e-mail address. You will receive e-mail notification when new information is available in [address] 9. Click Sign Up. You can now view and download portions of your medical record. 10. Click the Download Summary menu link to download a portable copy of your medical information. Additional Information    If you have questions, please visit the Frequently Asked Questions section of the [facility] website at [email or web address] Remember, [facility] is NOT to be used for urgent needs. For medical emergencies, dial 911.       Patient armband removed and shredded

## 2018-11-20 NOTE — DISCHARGE SUMMARY
[facility]  Inpatient Psychiatry   Discharge Summary     Admit date: 11/7/2018    Discharge date and time: 11/20/2018    Discharge Physician: Eliezer Doan MD    DISCHARGE DIAGNOSES     Psychiatric Diagnoses:   Patient Active Problem List   Diagnosis Code    Blister of foot or toe [id number]    DM w/o complication type II ([facility] E11.9    Hypertension I10    Penis pain N48.89    BPH (benign prostatic hyperplasia) N40.0    Lower urinary tract symptoms (LUTS) R39.9    Elevated prostate specific antigen (PSA) R97.20    Abnormal prostate exam R39.89    Schizophrenia ([facility] F20.9       Level of impairment/disability: Naresh Peralta is a 70 y.o. BLACK OR  male with a history of Schizophrenia who was admitted under TDO for exacerbation of psychosis. According to [facility] evaluation, pt's wife took out ECO due to pt not taking medications, hearing voices, not eating, bathing or taking care of ADLs. Wife said he was \"not polite\" and \"wide eyed\". He started showing some bizarre behavior last month and went missing for a day on October 17th. He has also recently walked away from his job because people are staring at him. According to nurse's report, pt has been uncooperative with assessment since admission yesterday evening. He refused all meds and meals. He did allow for his blood sugar to be checked. On evaluation, pt is cooperative with interview and answers questions in a linear and organized fashion. He says he has been taking Haloperidol at home but decided to stop taking other medications. He says \"I found out I could heal myself from sugar Diabetes. \" When asked how he was going to do that, he paused then said \"I can heal myself. \" he denies hallucinations or SI but endorses paranoia. He feels like people are watching him. When asked why he has not been going to work, he said he injured his back early last month and his PCP took him out of work for a few days. When he returned he still had pain in his back, right knee and hip so decided not to go back. Pt denies history of trauma or PTSD symptoms. He denies manic symptoms and history of substance abuse. Pt admitted and treated with biopsychosocial treatment plan. He received individual, group and medication therapy. He was observed for psychosis. Initially, was quite hostile and uncooperative with treatment plan. Provider had to get authorization for court ordered medications as pt was refusing all medications when he initially came in. Pt eventually agreed to take oral medications including Haldol. Dose was increased to 10mg bid and Cogentin was added to prevent EPS. Pt did not show signs of EPS such as cogwheel rigidity, akathisia or dystonia. Pt reported inprovement in auditory hallucinations and paranoia but the hallucinations did not completely sara. He endorsed euthymic mood although his affect was somewhat flat and restricted . His wife stated that was his baseline affect. Pt did not require seclusion or restraint. He had one incidence of physical aggression earlier on in hospital course which required IM medication but he did not have further incidences of aggression. Pt is eager for discharge today and treatment team is comfortable with discharging him today with outpatient follow up. His wife is supportive and will be helping him adhere to medication regimen. DISPOSITION/FOLLOW-UP     Disposition: Home    Follow-up Appointments: Follow-up Information     Follow up With Specialties Details Why Contact Lisa Cheek [facility] Internal Medicine   [address]  [address] [place] [address] [id number] [phone]              MEDICATION CHANGES   Outpatient medications:  No current facility-administered medications on file prior to encounter. Current Outpatient Medications on File Prior to Encounter   Medication Sig Dispense Refill    aspirin delayed-release 81 mg tablet 81 mg.  glucose blood VI test strips (ASCENSIA AUTODISC VI, ONE TOUCH ULTRA TEST VI) strip 50 Each.  docusate sodium (COLACE) 100 mg capsule 100 mg.      glimepiride (AMARYL) 4 mg tablet 4 mg.  atorvastatin (LIPITOR) 10 mg tablet 10 mg.      finasteride (PROSCAR) 5 mg tablet 5 mg.  haloperidol (HALDOL) 10 mg tablet 10 mg.      Lancets misc 1 Each.  lisinopril (PRINIVIL, ZESTRIL) 20 mg tablet 20 mg.      loratadine (CLARITIN) 10 mg tablet Take As Needed.  diltiazem CD (CARDIZEM CD) 120 mg ER capsule Take  by mouth daily.  atorvastatin (LIPITOR) 10 mg tablet Take  by mouth daily.  haloperidol (HALDOL) 0.5 mg tablet Take 0.5 mg by mouth four (4) times daily.  finasteride (PROSCAR) 5 mg tablet Take 1 Tab by mouth daily. 90 Tab 3         Medications discontinued during hospitalization:  Medications Discontinued During This Encounter   Medication Reason    haloperidol (HALDOL) tablet 10 mg     haloperidol (HALDOL) tablet 5 mg Alternate Therapy    haloperidol (HALDOL) tablet 5 mg     insulin lispro (HUMALOG) injection     haloperidol (HALDOL) tablet 7.5 mg     polyethylene glycol (MIRALAX) packet 17 g     pioglitazone (ACTOS) 30 mg tablet Reorder         Discharged medication:  Current Discharge Medication List      START taking these medications    Details   benztropine (COGENTIN) 0.5 mg tablet Take 1 Tab by mouth two (2) times a day. Qty: 60 Tab, Refills: 0      melatonin 3 mg tablet Take 2 Tabs by mouth nightly. Qty: 30 Tab, Refills: 0         CONTINUE these medications which have CHANGED    Details   pioglitazone (ACTOS) 30 mg tablet Take 1.5 Tabs by mouth daily. Qty: 30 Tab, Refills: 0      haloperidol (HALDOL) 10 mg tablet Take 1 Tab by mouth two (2) times a day. Qty: 60 Tab, Refills: 0         CONTINUE these medications which have NOT CHANGED    Details   aspirin delayed-release 81 mg tablet 81 mg.      docusate sodium (COLACE) 100 mg capsule 100 mg. glimepiride (AMARYL) 4 mg tablet 4 mg.      lisinopril (PRINIVIL, ZESTRIL) 20 mg tablet 20 mg.      loratadine (CLARITIN) 10 mg tablet Take As Needed. diltiazem CD (CARDIZEM CD) 120 mg ER capsule Take  by mouth daily. atorvastatin (LIPITOR) 10 mg tablet Take  by mouth daily. finasteride (PROSCAR) 5 mg tablet Take 1 Tab by mouth daily. Qty: 90 Tab, Refills: 3         STOP taking these medications       glucose blood VI test strips (ASCENSIA AUTODISC VI, ONE TOUCH ULTRA TEST VI) strip Comments:   Reason for Stopping:         Lancets misc Comments:   Reason for Stopping:               Instructions, risks (black box warning), benefits and side effects (EPS, TD, NMS) were discussed in detail prior to discharge. Patient denied any adverse medication side effects prior to discharge. LABS/IMAGING DURING ADMISSION     Results for orders placed or performed during the hospital encounter of 11/07/18   CBC WITH AUTOMATED DIFF   Result Value Ref Range    WBC 7.1 4.6 - 13.2 K/uL    RBC 4.15 (L) 4.70 - 5.50 M/uL    HGB 12.9 (L) 13.0 - 16.0 g/dL    HCT 37.3 36.0 - 48.0 %    MCV 89.9 74.0 - 97.0 FL    MCH 31.1 24.0 - 34.0 PG    MCHC 34.6 31.0 - 37.0 g/dL    RDW 13.3 11.6 - 14.5 %    PLATELET 216 382 - 969 K/uL    MPV 10.4 9.2 - 11.8 FL    NEUTROPHILS 43 40 - 73 %    LYMPHOCYTES 47 21 - 52 %    MONOCYTES 9 3 - 10 %    EOSINOPHILS 1 0 - 5 %    BASOPHILS 0 0 - 2 %    ABS. NEUTROPHILS 3.1 1.8 - 8.0 K/UL    ABS. LYMPHOCYTES 3.3 0.9 - 3.6 K/UL    ABS. MONOCYTES 0.6 0.05 - 1.2 K/UL    ABS. EOSINOPHILS 0.1 0.0 - 0.4 K/UL    ABS.  BASOPHILS 0.0 0.0 - 0.1 K/UL    DF AUTOMATED     METABOLIC PANEL, COMPREHENSIVE   Result Value Ref Range    Sodium 136 136 - 145 mmol/L    Potassium 3.9 3.5 - 5.5 mmol/L    Chloride 103 100 - 108 mmol/L    CO2 24 21 - 32 mmol/L    Anion gap 9 3.0 - 18 mmol/L    Glucose 261 (H) 74 - 99 mg/dL    BUN 18 7.0 - 18 MG/DL    Creatinine 1.33 (H) 0.6 - 1.3 MG/DL    BUN/Creatinine ratio 14 12 - 20 GFR est AA >60 >60 ml/min/1.73m2    GFR est non-AA 54 (L) >60 ml/min/1.73m2    Calcium 9.1 8.5 - 10.1 MG/DL    Bilirubin, total 0.3 0.2 - 1.0 MG/DL    ALT (SGPT) 22 16 - 61 U/L    AST (SGOT) 13 (L) 15 - 37 U/L    Alk.  phosphatase 79 45 - 117 U/L    Protein, total 7.9 6.4 - 8.2 g/dL    Albumin 3.5 3.4 - 5.0 g/dL    Globulin 4.4 (H) 2.0 - 4.0 g/dL    A-G Ratio 0.8 0.8 - 1.7     MAGNESIUM   Result Value Ref Range    Magnesium 2.1 1.6 - 2.6 mg/dL   CARDIAC PANEL,(CK, CKMB & TROPONIN)   Result Value Ref Range    CK 82 39 - 308 U/L    CK - MB <1.0 <3.6 ng/ml    CK-MB Index  0.0 - 4.0 %     CALCULATION NOT PERFORMED WHEN RESULT IS BELOW LINEAR LIMIT    Troponin-I, Qt. <0.02 0.0 - 0.045 NG/ML   GLUCOSE, POC   Result Value Ref Range    Glucose (POC) 219 (H) 70 - 110 mg/dL   GLUCOSE, POC   Result Value Ref Range    Glucose (POC) 162 (H) 70 - 110 mg/dL   GLUCOSE, POC   Result Value Ref Range    Glucose (POC) 172 (H) 70 - 110 mg/dL   GLUCOSE, POC   Result Value Ref Range    Glucose (POC) 148 (H) 70 - 110 mg/dL   GLUCOSE, POC   Result Value Ref Range    Glucose (POC) 256 (H) 70 - 110 mg/dL   GLUCOSE, POC   Result Value Ref Range    Glucose (POC) 219 (H) 70 - 110 mg/dL   GLUCOSE, POC   Result Value Ref Range    Glucose (POC) 184 (H) 70 - 110 mg/dL   GLUCOSE, POC   Result Value Ref Range    Glucose (POC) 159 (H) 70 - 110 mg/dL   GLUCOSE, POC   Result Value Ref Range    Glucose (POC) 259 (H) 70 - 110 mg/dL   GLUCOSE, POC   Result Value Ref Range    Glucose (POC) 329 (H) 70 - 110 mg/dL   GLUCOSE, POC   Result Value Ref Range    Glucose (POC) 183 (H) 70 - 110 mg/dL   GLUCOSE, POC   Result Value Ref Range    Glucose (POC) 189 (H) 70 - 110 mg/dL   GLUCOSE, POC   Result Value Ref Range    Glucose (POC) 191 (H) 70 - 110 mg/dL   GLUCOSE, POC   Result Value Ref Range    Glucose (POC) 207 (H) 70 - 110 mg/dL   GLUCOSE, POC   Result Value Ref Range    Glucose (POC) 296 (H) 70 - 110 mg/dL   GLUCOSE, POC   Result Value Ref Range    Glucose (POC) 186 (H) 70 - 110 mg/dL   GLUCOSE, POC   Result Value Ref Range    Glucose (POC) 300 (H) 70 - 110 mg/dL   GLUCOSE, POC   Result Value Ref Range    Glucose (POC) 212 (H) 70 - 110 mg/dL   GLUCOSE, POC   Result Value Ref Range    Glucose (POC) 174 (H) 70 - 110 mg/dL   GLUCOSE, POC   Result Value Ref Range    Glucose (POC) 188 (H) 70 - 110 mg/dL   GLUCOSE, POC   Result Value Ref Range    Glucose (POC) 236 (H) 70 - 110 mg/dL   GLUCOSE, POC   Result Value Ref Range    Glucose (POC) 233 (H) 70 - 110 mg/dL   GLUCOSE, POC   Result Value Ref Range    Glucose (POC) 175 (H) 70 - 110 mg/dL   EKG, 12 LEAD, INITIAL   Result Value Ref Range    Ventricular Rate 109 BPM    Atrial Rate 109 BPM    P-R Interval 152 ms    QRS Duration 84 ms    Q-T Interval 338 ms    QTC Calculation (Bezet) 455 ms    Calculated P Axis 83 degrees    Calculated R Axis 90 degrees    Calculated T Axis 64 degrees    Diagnosis       Sinus tachycardia  Right atrial enlargement  Rightward axis    Abnormal ECG  When compared with ECG of 21-JUL-2010 11:21,  No significant change was found  Confirmed by Justo Whatley ([id number]) on 11/7/2018 4:25:03 PM          DISCHARGE MENTAL STATUS EVALUATION     Appearance/Hygiene 70 y.o.  BLACK OR  male  Hygiene: Fair   Attitude/Behavior/Social Relatedness Appropriate   Musculoskeletal Gait/Station: appropriate  Tone (flaccid, cogwheeling, spastic): not assessed  Psychomotor (hyperkinetic, hypokinetic): calm  Involuntary movements (tics, dyskinesias, akathisa, stereotypies): none   Speech   Rate, rhythm, volume, fluency and articulation are appropriate   Mood   euthymic   Affect    restricted   Thought Process Linear and goal directed   Thought Content and Perceptual Disturbances Denies self-injurious behavior (SIB), suicidal ideation (SI), aggressive behavior or homicidal ideation (HI)  Endorses auditory hallucinations   Sensorium and Cognition  Grossly intact   Insight  limited   Judgment fair       SUICIDE RISK ASSESSMENT     [] Admission  [x] Discharge     Key Factors:   Current admission precipitated by suicide attempt?   []  Yes     2    [x]  No     1     Suicide Attempt History  [] Past attempts of high lethality    2 []  Past attempts of low lethality    1 [x]  No previous attempts       0   Suicidal Ideation []  Constant suicidal thoughts      2 []  Intermittent or fleeting suicidal  thoughts  1 [x]  Denies current suicidal thoughts    0   Suicide Plan   []  Has plan with actual OR potential access to planned method    2 []  Has plan without access to planned method      1 [x]  No plan            0   Plan Lethality []  Highly lethal plan (Carbon monoxide, gun, hanging, jumping)    2 []  Moderate lethality of plan          1 []  Low lethality of plan (biting, head banging, superficial scratching, pillow over face)  0   Safety Plan Agreement  []  Unwilling OR unable to agree due to impaired reality testing   2   []  Patient is ambivalent and/or guarded      1 [x]  Reliably agrees        0   Current Morbid Thoughts (reunion fantasies, preoccupations with death) []  Constantly     2     []  Frequently    1 [x]  Rarely    0   Elopement Risk  []  High risk     2 []  Moderate risk    1 [x]   Low risk    0   Symptoms    []  Hopeless  []  Helpless  []  Anhedonia   []  Guilt/shame  []  Anger/rage  []  Anxiety  []  Insomnia   []  Agitation   []  Impulsivity  []  5-6 symptoms present    2 []  3-4 symptoms present    1  [x]  0-2 symptoms present    0     Scoring Key:  10 or higher = Imminent Risk (consider 1:1)  4 - 9 = Moderate Risk (consider q 15 minute observation)Attended alcohol, tobacco, prescription and other drug psychoeducation group.   0 - 3 = Low Risk (consider q 30 minute observation)    Total Score: 1  ------------------------------------------------------------------------------------------------------------------  PLEASE ADDRESS THE FOLLOWING 5 ISSUES     Physician's Subjective Appraisal of Risk (check one):  []  Patient replies not trustworthy: several non-verbal cues. []  Patient replies questionable: trustworthy: at least 1 non-verbal cue. [x]  Patient replies appear trustworthy. Family History of Suicide? []  Yes  [x]  No    Protective measures (select all that apply):  [x]  Successful past responses to stress  [x]  Spiritual/Yarsanism beliefs  [x]  Capacity for reality testing  [x]  Positive therapeutic relationships  [x]  Social supports/connections  [x]  Positive coping skills  []  Frustration tolerance/optimism  []  Children or pets in the home  [x]  Sense of responsibility to family  [x]  Agrees to treatment plan and follow up    Others (list):    High Risk Diagnoses (select all that apply):  []  Depression/Bipolar Disorder  []  Dual Diagnosis  []  Cardiovascular Disease  [x]  Schizophrenia  []  Chronic Pain  []  Epilepsy  []  Cancer  []  Personality Disorder  []  HIV/AIDS  []  Multiple Sclerosis    Dangerousness Assessment (Suicide, homicide, property destruction. ..)    Risk Factors reviewed and risk assessed to be:  [x] low  [] low-moderate  [] moderate   [] moderate-high  [] high     Protection factors reviewed and risk assessed to be:  [x] low  [] low-moderate  [] moderate   [] moderate-high  [] high     Response to treatment and risk assessed to be:  [x] low  [] low-moderate  [] moderate   [] moderate-high  [] high     Support reviewed and risk assessed to be:  [x] low  [] low-moderate  [] moderate   [] moderate-high  [] high     Acceptance of Discharge and outpatient treatment reviewed and risk assessed to be:    [x] low  [] low-moderate  [] moderate   [] moderate-high  [] high   Overall risk assessed to be:  [x] low  [] low-moderate  [] moderate   [] moderate-high  [] high     Completion of discharge was greater than 30 minutes. Over 50% of today's discharge was geared towards counseling and coordination of care.           Blayne Gamble MD  Psychiatry  [facility]

## 2018-11-20 NOTE — BH NOTES
GROUP THERAPY PROGRESS NOTE Radha Schilling Did not participate in Recreational Therapy despite staff encouragement.

## 2018-11-20 NOTE — PROGRESS NOTES
Patient spent most of his day in his bed resting,he was calm during  shift  he got up ate and took his medication staff will continue to monitor.

## 2018-12-10 ENCOUNTER — APPOINTMENT (OUTPATIENT)
Dept: GENERAL RADIOLOGY | Age: 63
End: 2018-12-10
Attending: PHYSICIAN ASSISTANT
Payer: MEDICARE

## 2018-12-10 ENCOUNTER — APPOINTMENT (OUTPATIENT)
Dept: CT IMAGING | Age: 63
End: 2018-12-10
Attending: EMERGENCY MEDICINE
Payer: MEDICARE

## 2018-12-10 ENCOUNTER — HOSPITAL ENCOUNTER (OUTPATIENT)
Age: 63
Setting detail: OBSERVATION
LOS: 1 days | Discharge: HOME | End: 2018-12-18
Attending: EMERGENCY MEDICINE | Admitting: INTERNAL MEDICINE
Payer: MEDICARE

## 2018-12-10 DIAGNOSIS — R19.00 ABDOMINAL MASS, UNSPECIFIED ABDOMINAL LOCATION: ICD-10-CM

## 2018-12-10 DIAGNOSIS — R07.9 ACUTE CHEST PAIN: Primary | ICD-10-CM

## 2018-12-10 LAB
ANION GAP SERPL CALC-SCNC: 9 MMOL/L (ref 3–18)
ATRIAL RATE: 73 BPM
BASOPHILS # BLD: 0 K/UL (ref 0–0.1)
BASOPHILS NFR BLD: 0 % (ref 0–2)
BUN SERPL-MCNC: 15 MG/DL (ref 7–18)
BUN/CREAT SERPL: 12 (ref 12–20)
CALCIUM SERPL-MCNC: 9.4 MG/DL (ref 8.5–10.1)
CALCULATED P AXIS, ECG09: 83 DEGREES
CALCULATED R AXIS, ECG10: 85 DEGREES
CALCULATED T AXIS, ECG11: 71 DEGREES
CHLORIDE SERPL-SCNC: 102 MMOL/L (ref 100–108)
CK MB CFR SERPL CALC: NORMAL % (ref 0–4)
CK MB CFR SERPL CALC: NORMAL % (ref 0–4)
CK MB SERPL-MCNC: <1 NG/ML (ref 5–25)
CK MB SERPL-MCNC: <1 NG/ML (ref 5–25)
CK SERPL-CCNC: 68 U/L (ref 39–308)
CK SERPL-CCNC: 83 U/L (ref 39–308)
CO2 SERPL-SCNC: 28 MMOL/L (ref 21–32)
CREAT SERPL-MCNC: 1.28 MG/DL (ref 0.6–1.3)
D DIMER PPP FEU-MCNC: 1.46 UG/ML(FEU)
DIAGNOSIS, 93000: NORMAL
DIFFERENTIAL METHOD BLD: ABNORMAL
EOSINOPHIL # BLD: 0.1 K/UL (ref 0–0.4)
EOSINOPHIL NFR BLD: 2 % (ref 0–5)
ERYTHROCYTE [DISTWIDTH] IN BLOOD BY AUTOMATED COUNT: 13.1 % (ref 11.6–14.5)
GLUCOSE BLD STRIP.AUTO-MCNC: 167 MG/DL (ref 70–110)
GLUCOSE SERPL-MCNC: 304 MG/DL (ref 74–99)
HCT VFR BLD AUTO: 38.7 % (ref 36–48)
HGB BLD-MCNC: 13.2 G/DL (ref 13–16)
LYMPHOCYTES # BLD: 1.7 K/UL (ref 0.9–3.6)
LYMPHOCYTES NFR BLD: 31 % (ref 21–52)
MCH RBC QN AUTO: 31.1 PG (ref 24–34)
MCHC RBC AUTO-ENTMCNC: 34.1 G/DL (ref 31–37)
MCV RBC AUTO: 91.3 FL (ref 74–97)
MONOCYTES # BLD: 0.5 K/UL (ref 0.05–1.2)
MONOCYTES NFR BLD: 8 % (ref 3–10)
NEUTS SEG # BLD: 3.3 K/UL (ref 1.8–8)
NEUTS SEG NFR BLD: 59 % (ref 40–73)
P-R INTERVAL, ECG05: 150 MS
PLATELET # BLD AUTO: 267 K/UL (ref 135–420)
PMV BLD AUTO: 10.5 FL (ref 9.2–11.8)
POTASSIUM SERPL-SCNC: 4.1 MMOL/L (ref 3.5–5.5)
Q-T INTERVAL, ECG07: 374 MS
QRS DURATION, ECG06: 86 MS
QTC CALCULATION (BEZET), ECG08: 412 MS
RBC # BLD AUTO: 4.24 M/UL (ref 4.7–5.5)
SODIUM SERPL-SCNC: 139 MMOL/L (ref 136–145)
TROPONIN I SERPL-MCNC: <0.02 NG/ML (ref 0–0.04)
TROPONIN I SERPL-MCNC: <0.02 NG/ML (ref 0–0.04)
VENTRICULAR RATE, ECG03: 73 BPM
WBC # BLD AUTO: 5.5 K/UL (ref 4.6–13.2)

## 2018-12-10 PROCEDURE — 71046 X-RAY EXAM CHEST 2 VIEWS: CPT

## 2018-12-10 PROCEDURE — 74011636320 HC RX REV CODE- 636/320: Performed by: EMERGENCY MEDICINE

## 2018-12-10 PROCEDURE — 65660000000 HC RM CCU STEPDOWN

## 2018-12-10 PROCEDURE — 65390000012 HC CONDITION CODE 44 OBSERVATION

## 2018-12-10 PROCEDURE — 74011250636 HC RX REV CODE- 250/636: Performed by: INTERNAL MEDICINE

## 2018-12-10 PROCEDURE — 93005 ELECTROCARDIOGRAM TRACING: CPT

## 2018-12-10 PROCEDURE — 82553 CREATINE MB FRACTION: CPT

## 2018-12-10 PROCEDURE — 99285 EMERGENCY DEPT VISIT HI MDM: CPT

## 2018-12-10 PROCEDURE — 74011250637 HC RX REV CODE- 250/637: Performed by: INTERNAL MEDICINE

## 2018-12-10 PROCEDURE — 96372 THER/PROPH/DIAG INJ SC/IM: CPT

## 2018-12-10 PROCEDURE — 85025 COMPLETE CBC W/AUTO DIFF WBC: CPT

## 2018-12-10 PROCEDURE — 85379 FIBRIN DEGRADATION QUANT: CPT

## 2018-12-10 PROCEDURE — 80048 BASIC METABOLIC PNL TOTAL CA: CPT

## 2018-12-10 PROCEDURE — 74011636637 HC RX REV CODE- 636/637: Performed by: INTERNAL MEDICINE

## 2018-12-10 PROCEDURE — 82962 GLUCOSE BLOOD TEST: CPT

## 2018-12-10 PROCEDURE — 74011250637 HC RX REV CODE- 250/637: Performed by: EMERGENCY MEDICINE

## 2018-12-10 PROCEDURE — 71275 CT ANGIOGRAPHY CHEST: CPT

## 2018-12-10 RX ORDER — BENZTROPINE MESYLATE 1 MG/1
0.5 TABLET ORAL 2 TIMES DAILY
Status: DISCONTINUED | OUTPATIENT
Start: 2018-12-10 | End: 2018-12-18 | Stop reason: HOSPADM

## 2018-12-10 RX ORDER — DILTIAZEM HYDROCHLORIDE 120 MG/1
120 CAPSULE, COATED, EXTENDED RELEASE ORAL DAILY
Status: DISCONTINUED | OUTPATIENT
Start: 2018-12-11 | End: 2018-12-18 | Stop reason: HOSPADM

## 2018-12-10 RX ORDER — LISINOPRIL 20 MG/1
20 TABLET ORAL DAILY
Status: DISCONTINUED | OUTPATIENT
Start: 2018-12-10 | End: 2018-12-18 | Stop reason: HOSPADM

## 2018-12-10 RX ORDER — DOCUSATE SODIUM 100 MG/1
100 CAPSULE, LIQUID FILLED ORAL DAILY
Status: DISCONTINUED | OUTPATIENT
Start: 2018-12-11 | End: 2018-12-18 | Stop reason: HOSPADM

## 2018-12-10 RX ORDER — ASPIRIN 81 MG/1
81 TABLET ORAL DAILY
Status: DISCONTINUED | OUTPATIENT
Start: 2018-12-11 | End: 2018-12-13

## 2018-12-10 RX ORDER — INSULIN LISPRO 100 [IU]/ML
INJECTION, SOLUTION INTRAVENOUS; SUBCUTANEOUS
Status: DISCONTINUED | OUTPATIENT
Start: 2018-12-10 | End: 2018-12-18 | Stop reason: HOSPADM

## 2018-12-10 RX ORDER — ATORVASTATIN CALCIUM 10 MG/1
10 TABLET, FILM COATED ORAL DAILY
Status: DISCONTINUED | OUTPATIENT
Start: 2018-12-11 | End: 2018-12-18 | Stop reason: HOSPADM

## 2018-12-10 RX ORDER — ENOXAPARIN SODIUM 100 MG/ML
40 INJECTION SUBCUTANEOUS EVERY 24 HOURS
Status: DISCONTINUED | OUTPATIENT
Start: 2018-12-10 | End: 2018-12-13

## 2018-12-10 RX ORDER — GLIMEPIRIDE 1 MG/1
4 TABLET ORAL
Status: DISCONTINUED | OUTPATIENT
Start: 2018-12-11 | End: 2018-12-18 | Stop reason: HOSPADM

## 2018-12-10 RX ORDER — LANOLIN ALCOHOL/MO/W.PET/CERES
6 CREAM (GRAM) TOPICAL
Status: DISCONTINUED | OUTPATIENT
Start: 2018-12-10 | End: 2018-12-18 | Stop reason: HOSPADM

## 2018-12-10 RX ORDER — ACETAMINOPHEN 325 MG/1
650 TABLET ORAL
Status: DISCONTINUED | OUTPATIENT
Start: 2018-12-10 | End: 2018-12-18 | Stop reason: HOSPADM

## 2018-12-10 RX ORDER — ASPIRIN 325 MG
325 TABLET ORAL
Status: COMPLETED | OUTPATIENT
Start: 2018-12-10 | End: 2018-12-10

## 2018-12-10 RX ORDER — ONDANSETRON 2 MG/ML
4 INJECTION INTRAMUSCULAR; INTRAVENOUS
Status: DISCONTINUED | OUTPATIENT
Start: 2018-12-10 | End: 2018-12-18 | Stop reason: HOSPADM

## 2018-12-10 RX ORDER — MAGNESIUM SULFATE 100 %
16 CRYSTALS MISCELLANEOUS AS NEEDED
Status: DISCONTINUED | OUTPATIENT
Start: 2018-12-10 | End: 2018-12-18 | Stop reason: HOSPADM

## 2018-12-10 RX ORDER — FINASTERIDE 5 MG/1
5 TABLET, FILM COATED ORAL DAILY
Status: DISCONTINUED | OUTPATIENT
Start: 2018-12-11 | End: 2018-12-18 | Stop reason: HOSPADM

## 2018-12-10 RX ORDER — HALOPERIDOL 10 MG/1
10 TABLET ORAL 2 TIMES DAILY
Status: DISCONTINUED | OUTPATIENT
Start: 2018-12-10 | End: 2018-12-18 | Stop reason: HOSPADM

## 2018-12-10 RX ORDER — DEXTROSE 50 % IN WATER (D50W) INTRAVENOUS SYRINGE
25-50 AS NEEDED
Status: DISCONTINUED | OUTPATIENT
Start: 2018-12-10 | End: 2018-12-18 | Stop reason: HOSPADM

## 2018-12-10 RX ADMIN — LISINOPRIL 20 MG: 20 TABLET ORAL at 22:10

## 2018-12-10 RX ADMIN — INSULIN LISPRO 2 UNITS: 100 INJECTION, SOLUTION INTRAVENOUS; SUBCUTANEOUS at 22:12

## 2018-12-10 RX ADMIN — IOPAMIDOL 100 ML: 755 INJECTION, SOLUTION INTRAVENOUS at 15:22

## 2018-12-10 RX ADMIN — BENZTROPINE MESYLATE 1 MG: 1 TABLET ORAL at 22:10

## 2018-12-10 RX ADMIN — MELATONIN TAB 3 MG 6 MG: 3 TAB at 22:10

## 2018-12-10 RX ADMIN — ENOXAPARIN SODIUM 40 MG: 40 INJECTION SUBCUTANEOUS at 22:12

## 2018-12-10 RX ADMIN — ASPIRIN 325 MG ORAL TABLET 325 MG: 325 PILL ORAL at 14:08

## 2018-12-10 RX ADMIN — HALOPERIDOL 10 MG: 10 TABLET ORAL at 22:10

## 2018-12-10 NOTE — H&P
History and Physical 
NAME:  Roderick Soriano :   1955 MRN:   [id number] Date/Time:  12/10/2018 CHIEF COMPLAINT: chest pain HISTORY OF PRESENT ILLNESS:    
Mr. Prashanth Maradiaga is a 70 y.o.   male with a PMH of HTN, DM-2 and schizophrenia who presents with c/c of chest pain. He has retrosternal chest pain, non radiating, not associated with diaphoresis or palpitation. He dennies dyspnea, orthopnea or PND. No cough, fever or chills. Here in ED he has EKG and cardiac enzymes which was unremarkable. He had elevated dimer and CTA of the chest was ordered which showed no PE but seen large lobulated and ill-defined mass at left upper quadrant posteriorly arising or abutting the gastric wall only partially seen. He denies abdominal pain, nausea or vomiting. Past Medical History:  
Diagnosis Date  Blister of foot or toe  BPH (benign prostatic hyperplasia)  DM w/o complication type II ([address]  Elevated prostate specific antigen (PSA)  Hypertension  Lower urinary tract symptoms (LUTS)  Nodular prostate without urinary obstruction  Unspecified disorder of penis Past Surgical History:  
Procedure Laterality Date  HX UROLOGICAL  2011 Negative Prostate Biopsy; Dr. Nadeem Day RIGHT APEX FROM 2010 Social History Tobacco Use  Smoking status: Never Smoker  Smokeless tobacco: Never Used Substance Use Topics  Alcohol use: No  
  Alcohol/week: 0.0 oz No family history on file. No Known Allergies Prior to Admission medications Medication Sig Start Date End Date Taking? Authorizing Provider  
pioglitazone (ACTOS) 30 mg tablet Take 1.5 Tabs by mouth daily. 18   Fariba Colon MD  
benztropine (COGENTIN) 0.5 mg tablet Take 1 Tab by mouth two (2) times a day. 18   Fariba Colon MD  
haloperidol (HALDOL) 10 mg tablet Take 1 Tab by mouth two (2) times a day. 11/20/18   Keyanna Colon MD  
melatonin 3 mg tablet Take 2 Tabs by mouth nightly. 11/20/18   Keyanna Colon MD  
aspirin delayed-release 81 mg tablet 81 mg.    Provider, Historical  
docusate sodium (COLACE) 100 mg capsule 100 mg. 10/28/13   Provider, Historical  
glimepiride (AMARYL) 4 mg tablet 4 mg. 1/27/17   Provider, Historical  
lisinopril (PRINIVIL, ZESTRIL) 20 mg tablet 20 mg. 6/20/16   Provider, Historical  
loratadine (CLARITIN) 10 mg tablet Take As Needed. 8/11/16   Provider, Historical  
diltiazem CD (CARDIZEM CD) 120 mg ER capsule Take  by mouth daily. Provider, Historical  
atorvastatin (LIPITOR) 10 mg tablet Take  by mouth daily. Provider, Historical  
finasteride (PROSCAR) 5 mg tablet Take 1 Tab by mouth daily. 8/20/12   Kaylie Castillo MD  
 
 
REVIEW OF SYSTEMS:  
 
CONSTITUTIONAL: No Fever, No chills, No weight loss, No Night sweats HEENT:  No epistaxis, No diff in swallowing CVS: chest pain, No palpitations, No syncope, No peripheral edema, No PND, No orthopnea RS: No shortness of breath, No cough, No hemoptysis, No pleuritic chest pain GI: No abd pain, No vomitting, No diarrhea, No hematemesis, No rectal bleeding, No acid reflux or heartburn NEURO: No focal weakness, No headaches, No seizures PSYCH: No anxiety, No depression MUSCULOSKLETAL: No joint pain or swelling : No hematuria or dysuria SKIN: No rash Physical Exam: VITALS:   
Vital signs reviewed; most recent are: 
 
Visit Vitals /84 Pulse (!) 59 Temp 98.1 °F (36.7 °C) Resp 18 SpO2 100% SpO2 Readings from Last 6 Encounters:  
12/10/18 100% 11/07/18 100% No intake or output data in the 24 hours ending 12/10/18 1812 GENERAL: Not in acute distress HEENT: pink conjunctiva, un icteric sclera, NECK: No lymphadenopthy or thyroid swelling, JVD not seen LYMPH: No supraclavicular or cervical or axillary nodes on both sides CVS: S1S2, No murmurs, No gallop or rub RS: CTA, No wheezing or crackles Abd: Soft, non tender, not distended, No guarding, No rigidity NEURO:  No focal neurologic deficits Extrm: no leg edema or swelling Skin: No rash Labs: 
Recent Results (from the past 24 hour(s)) EKG, 12 LEAD, INITIAL Collection Time: 12/10/18 11:21 AM  
Result Value Ref Range Ventricular Rate 73 BPM  
 Atrial Rate 73 BPM  
 P-R Interval 150 ms QRS Duration 86 ms  
 Q-T Interval 374 ms QTC Calculation (Bezet) 412 ms Calculated P Axis 83 degrees Calculated R Axis 85 degrees Calculated T Axis 71 degrees Diagnosis Normal sinus rhythm Normal ECG When compared with ECG of 07-NOV-2018 14:39, 
Vent. rate has decreased BY  36 BPM 
ST elevation now present in Inferior leads Confirmed by Jeremi Cochran MD, --- ([id number]) on 12/10/2018 1:36:42 PM 
  
CBC WITH AUTOMATED DIFF Collection Time: 12/10/18 11:28 AM  
Result Value Ref Range WBC 5.5 4.6 - 13.2 K/uL  
 RBC 4.24 (L) 4.70 - 5.50 M/uL  
 HGB 13.2 13.0 - 16.0 g/dL HCT 38.7 36.0 - 48.0 % MCV 91.3 74.0 - 97.0 FL  
 MCH 31.1 24.0 - 34.0 PG  
 MCHC 34.1 31.0 - 37.0 g/dL  
 RDW 13.1 11.6 - 14.5 % PLATELET 399 016 - 032 K/uL MPV 10.5 9.2 - 11.8 FL  
 NEUTROPHILS 59 40 - 73 % LYMPHOCYTES 31 21 - 52 % MONOCYTES 8 3 - 10 % EOSINOPHILS 2 0 - 5 % BASOPHILS 0 0 - 2 %  
 ABS. NEUTROPHILS 3.3 1.8 - 8.0 K/UL  
 ABS. LYMPHOCYTES 1.7 0.9 - 3.6 K/UL  
 ABS. MONOCYTES 0.5 0.05 - 1.2 K/UL  
 ABS. EOSINOPHILS 0.1 0.0 - 0.4 K/UL  
 ABS. BASOPHILS 0.0 0.0 - 0.1 K/UL  
 DF AUTOMATED METABOLIC PANEL, BASIC Collection Time: 12/10/18 11:28 AM  
Result Value Ref Range Sodium 139 136 - 145 mmol/L Potassium 4.1 3.5 - 5.5 mmol/L Chloride 102 100 - 108 mmol/L  
 CO2 28 21 - 32 mmol/L Anion gap 9 3.0 - 18 mmol/L Glucose 304 (H) 74 - 99 mg/dL BUN 15 7.0 - 18 MG/DL Creatinine 1.28 0.6 - 1.3 MG/DL  
 BUN/Creatinine ratio 12 12 - 20 GFR est AA >60 >60 ml/min/1.73m2 GFR est non-AA 56 (L) >60 ml/min/1.73m2 Calcium 9.4 8.5 - 10.1 MG/DL  
CARDIAC PANEL,(CK, CKMB & TROPONIN) Collection Time: 12/10/18 11:28 AM  
Result Value Ref Range CK 83 39 - 308 U/L  
 CK - MB <1.0 <3.6 ng/ml CK-MB Index  0.0 - 4.0 % CALCULATION NOT PERFORMED WHEN RESULT IS BELOW LINEAR LIMIT Troponin-I, Qt. <0.02 0.0 - 0.045 NG/ML  
D DIMER Collection Time: 12/10/18 11:28 AM  
Result Value Ref Range D DIMER 1.46 (H) <0.46 ug/ml(FEU) CARDIAC PANEL,(CK, CKMB & TROPONIN) Collection Time: 12/10/18  2:30 PM  
Result Value Ref Range CK 68 39 - 308 U/L  
 CK - MB <1.0 <3.6 ng/ml CK-MB Index  0.0 - 4.0 % CALCULATION NOT PERFORMED WHEN RESULT IS BELOW LINEAR LIMIT Troponin-I, Qt. <0.02 0.0 - 0.045 NG/ML Active Problems: 
  Abdominal mass (12/10/2018) Acute chest pain (12/10/2018) Assessment: 1. Chest pain, atypical  
2. Left upper quadrant abdominal mass, partially seen on CT chest-Large lobulated and ill-defined mass at left upper quadrant posteriorly arising or abutting the gastric wall 3. HTN, controlled 4. DM-2, controlled 5. Schizophrenia Plan: · Admit to telemetry unit · His chest pain is atypical could be secondary to his LUQ abdominal mass which was partially seen on CT of the chest 
· Ordered CT abdomen · Resume home meds · Monitor blood glucose, SSI coverage · Full code · D/w patient and his wife Total time:  58 minutes 
 
        
_______________________________________________________________________ Attending Physician: Krystal Loyola MD  
 
 
Copies: None

## 2018-12-10 NOTE — ED PROVIDER NOTES
EMERGENCY DEPARTMENT HISTORY AND PHYSICAL EXAM 
 
12:43 PM 
 
 
Date: 12/10/2018 Patient Name: Elmer Cooley History of Presenting Illness Chief Complaint Patient presents with  Chest Pain History Provided By: Patient and Patient's Wife Chief Complaint: Chest pain Duration: 1 Days Timing:  Intermittent Location: diffuse Quality: throbbing Severity: Moderate Modifying Factors: No modifying or aggravating factors were reported. Associated Symptoms: auditory hallucinations, weight loss Additional History (Context): Elmer Cooley is a 70 y.o. male with diabetes and hypertension and schizophrenia who presents with intermittent, moderate, \"throbbing\" diffuse chest pain onset 1 day ago. He states the pain started while he was lying in bed. He denies modifying factors and medications taken. He denies hx of cardiac problems. He states his last episode of pain was about 3 hours ago, and is not currently in pain. He reports no new medications. His wife states he was admitted 11/7/18 for schizophrenia and discharged on 11/20/18. She states he has been around baseline, but not totally normal. He reports auditory hallucinations and weight loss but denies SI. He states that he does not currently have a PCP because his old PCP wanted him to see a different doctor. He reports that he has been taking his medications appropriately. He denies tobacco and EOTH use. No other complaints or symptoms were reported. PCP: None Current Outpatient Medications Medication Sig Dispense Refill  pioglitazone (ACTOS) 30 mg tablet Take 1.5 Tabs by mouth daily. 30 Tab 0  
 benztropine (COGENTIN) 0.5 mg tablet Take 1 Tab by mouth two (2) times a day. 60 Tab 0  
 haloperidol (HALDOL) 10 mg tablet Take 1 Tab by mouth two (2) times a day. 60 Tab 0  
 melatonin 3 mg tablet Take 2 Tabs by mouth nightly. 30 Tab 0  
 aspirin delayed-release 81 mg tablet 81 mg.  docusate sodium (COLACE) 100 mg capsule 100 mg.    
 glimepiride (AMARYL) 4 mg tablet 4 mg.  lisinopril (PRINIVIL, ZESTRIL) 20 mg tablet 20 mg.    
 loratadine (CLARITIN) 10 mg tablet Take As Needed.  diltiazem CD (CARDIZEM CD) 120 mg ER capsule Take  by mouth daily.  atorvastatin (LIPITOR) 10 mg tablet Take  by mouth daily.  finasteride (PROSCAR) 5 mg tablet Take 1 Tab by mouth daily. 90 Tab 3 Past History Past Medical History: 
Past Medical History:  
Diagnosis Date  Blister of foot or toe  BPH (benign prostatic hyperplasia)  DM w/o complication type II ([address]  Elevated prostate specific antigen (PSA)  Hypertension  Lower urinary tract symptoms (LUTS)  Nodular prostate without urinary obstruction  Unspecified disorder of penis Past Surgical History: 
Past Surgical History:  
Procedure Laterality Date  HX UROLOGICAL  7/11/2011 Negative Prostate Biopsy; Dr. Forman Flow RIGHT APEX FROM 03/2010 Family History: No family history on file. Social History: 
Social History Tobacco Use  Smoking status: Never Smoker  Smokeless tobacco: Never Used Substance Use Topics  Alcohol use: No  
  Alcohol/week: 0.0 oz  Drug use: No  
 
 
Allergies: 
No Known Allergies Review of Systems Review of Systems Constitutional: Positive for unexpected weight change (weight loss). Negative for activity change, fatigue and fever. HENT: Negative for congestion and rhinorrhea. Eyes: Negative for visual disturbance. Respiratory: Negative for shortness of breath. Cardiovascular: Positive for chest pain. Negative for palpitations. Gastrointestinal: Negative for abdominal pain, diarrhea, nausea and vomiting. Genitourinary: Negative for dysuria and hematuria. Musculoskeletal: Negative for back pain. Skin: Negative for rash. Neurological: Negative for dizziness, weakness and light-headedness. Psychiatric/Behavioral: Positive for hallucinations (auditory). Negative for suicidal ideas. All other systems reviewed and are negative. Physical Exam  
 
Visit Vitals /77 (BP 1 Location: Left arm, BP Patient Position: At rest) Pulse 72 Temp 98.1 °F (36.7 °C) Resp 14 SpO2 100% Physical Exam  
Constitutional: He is oriented to person, place, and time. He appears well-developed. No distress. Thin HENT:  
Head: Normocephalic and atraumatic. Right Ear: External ear normal.  
Left Ear: External ear normal.  
Nose: Nose normal.  
Mouth/Throat: Oropharynx is clear and moist.  
Eyes: Conjunctivae and EOM are normal. Pupils are equal, round, and reactive to light. No scleral icterus. Neck: Normal range of motion. Neck supple. No JVD present. No tracheal deviation present. No thyromegaly present. Cardiovascular: Normal rate, regular rhythm, normal heart sounds and intact distal pulses. Exam reveals no gallop and no friction rub. No murmur heard. Pulmonary/Chest: Effort normal and breath sounds normal. He exhibits no tenderness. Abdominal: Soft. Bowel sounds are normal. He exhibits no distension. There is no tenderness. There is no rebound and no guarding. Musculoskeletal: Normal range of motion. He exhibits no edema or tenderness. No calf pain Lymphadenopathy:  
  He has no cervical adenopathy. Neurological: He is alert and oriented to person, place, and time. No cranial nerve deficit. Coordination normal.  
No sensory loss, Gait normal, Motor 5/5 Skin: Skin is warm and dry. Psychiatric: He has a normal mood and affect. His behavior is normal. Judgment and thought content normal.  
Has hallucinations, no si/hi, supportive wife at the bedside Nursing note and vitals reviewed. Diagnostic Study Results Labs - Recent Results (from the past 12 hour(s)) EKG, 12 LEAD, INITIAL Collection Time: 12/10/18 11:21 AM  
Result Value Ref Range Ventricular Rate 73 BPM  
 Atrial Rate 73 BPM  
 P-R Interval 150 ms QRS Duration 86 ms  
 Q-T Interval 374 ms QTC Calculation (Bezet) 412 ms Calculated P Axis 83 degrees Calculated R Axis 85 degrees Calculated T Axis 71 degrees Diagnosis Normal sinus rhythm Normal ECG When compared with ECG of 07-NOV-2018 14:39, 
Vent. rate has decreased BY  36 BPM 
ST elevation now present in Inferior leads CBC WITH AUTOMATED DIFF Collection Time: 12/10/18 11:28 AM  
Result Value Ref Range WBC 5.5 4.6 - 13.2 K/uL  
 RBC 4.24 (L) 4.70 - 5.50 M/uL  
 HGB 13.2 13.0 - 16.0 g/dL HCT 38.7 36.0 - 48.0 % MCV 91.3 74.0 - 97.0 FL  
 MCH 31.1 24.0 - 34.0 PG  
 MCHC 34.1 31.0 - 37.0 g/dL  
 RDW 13.1 11.6 - 14.5 % PLATELET 053 443 - 716 K/uL MPV 10.5 9.2 - 11.8 FL  
 NEUTROPHILS 59 40 - 73 % LYMPHOCYTES 31 21 - 52 % MONOCYTES 8 3 - 10 % EOSINOPHILS 2 0 - 5 % BASOPHILS 0 0 - 2 %  
 ABS. NEUTROPHILS 3.3 1.8 - 8.0 K/UL  
 ABS. LYMPHOCYTES 1.7 0.9 - 3.6 K/UL  
 ABS. MONOCYTES 0.5 0.05 - 1.2 K/UL  
 ABS. EOSINOPHILS 0.1 0.0 - 0.4 K/UL  
 ABS. BASOPHILS 0.0 0.0 - 0.1 K/UL  
 DF AUTOMATED METABOLIC PANEL, BASIC Collection Time: 12/10/18 11:28 AM  
Result Value Ref Range Sodium 139 136 - 145 mmol/L Potassium 4.1 3.5 - 5.5 mmol/L Chloride 102 100 - 108 mmol/L  
 CO2 28 21 - 32 mmol/L Anion gap 9 3.0 - 18 mmol/L Glucose 304 (H) 74 - 99 mg/dL BUN 15 7.0 - 18 MG/DL Creatinine 1.28 0.6 - 1.3 MG/DL  
 BUN/Creatinine ratio 12 12 - 20 GFR est AA >60 >60 ml/min/1.73m2 GFR est non-AA 56 (L) >60 ml/min/1.73m2 Calcium 9.4 8.5 - 10.1 MG/DL  
CARDIAC PANEL,(CK, CKMB & TROPONIN) Collection Time: 12/10/18 11:28 AM  
Result Value Ref Range CK 83 39 - 308 U/L  
 CK - MB <1.0 <3.6 ng/ml CK-MB Index  0.0 - 4.0 % CALCULATION NOT PERFORMED WHEN RESULT IS BELOW LINEAR LIMIT Troponin-I, Qt. <0.02 0.0 - 0.045 NG/ML Radiologic Studies -  
XR CHEST PA LAT Final Result Impression: 1. No acute process Medical Decision Making I am the first provider for this patient. I reviewed the vital signs, available nursing notes, past medical history, past surgical history, family history and social history. Vital Signs-Reviewed the patient's vital signs. Pulse Oximetry Analysis -  100% on room air, stable Cardiac Monitor: 
Rate: 72 BPM 
 
EKG: Interpreted by the EP. Time Interpreted: 11:21 Rate: 73 BPM 
 Rhythm: Normal Sinus Rhythm Interpretation: No STEMI Records Reviewed: Nursing Notes and Old Medical Records (Time of Review: 12:43 PM) ED Course: Progress Notes, Reevaluation, and Consults: 
4:10 PM: Pt's heart score is 6. Ct scan shows RUQ mass that needs to be explored with US since pt cannot have another dose of contrast. Will admit for cardiac work up and further treatment for mass. 4:43 PM: Consult:  Discussed care with Dr. Brenna Powell (Hospitalist) Standard discussion; including history of patients chief complaint, available diagnostic results, and treatment course. Will admit to telemetry. 4:49 PM: Patient is now complaining of R leg pain. Will order DVT study during admission. Provider Notes (Medical Decision Making): MDM Number of Diagnoses or Management Options Diagnosis management comments: Pt is a 70yo male with a hx of schizophrenia, HTN, DM, dyslipidemia presents with nonexertional chest pressure that began yesterday and again at 10A with total resolution without intervention. Denied n/v/d and initial ekg is reassuring. Will follow serial cardiac markers, d-dimer, then reevaluate. Dianelys Moser DO 1:38 PM 
  
 
 
For Hospitalized Patients: 
 
1. Hospitalization Decision Time: The decision to hospitalize the patient was made by Dr. Dariel Knox at 4:10 PM on 12/10/2018 2. Aspirin: Aspirin was given Diagnosis Clinical Impression: No diagnosis found. Disposition: Admit Follow-up Information Follow up With Specialties Details Why Contact Info [facility] On 12/13/2018 Appointment with new PCP  at 10:10am [address] [address] [phone] [address]  In 3 days As needed DR. ARMSTRONG[address] [address] 
[phone] Medication List  
  
ASK your doctor about these medications   
aspirin delayed-release 81 mg tablet 
  
benztropine 0.5 mg tablet Commonly known as:  COGENTIN Take 1 Tab by mouth two (2) times a day. CARDIZEM  mg ER capsule Generic drug:  dilTIAZem CD 
  
docusate sodium 100 mg capsule Commonly known as:  COLACE 
  
finasteride 5 mg tablet Commonly known as:  PROSCAR Take 1 Tab by mouth daily. glimepiride 4 mg tablet Commonly known as:  AMARYL 
  
haloperidol 10 mg tablet Commonly known as:  HALDOL Take 1 Tab by mouth two (2) times a day. LIPITOR 10 mg tablet Generic drug:  atorvastatin 
  
lisinopril 20 mg tablet Commonly known as:  PRINIVIL, ZESTRIL 
  
loratadine 10 mg tablet Commonly known as:  CLARITIN 
  
melatonin 3 mg tablet Take 2 Tabs by mouth nightly. pioglitazone 30 mg tablet Commonly known as:  ACTOS Take 1.5 Tabs by mouth daily. _______________________________ Scribe Attestation Heidi Cassidy acting as a scribe for and in the presence of Luz Vaca MD     
December 10, 2018 at 12:43 PM 
    
Provider Attestation:     
I personally performed the services described in the documentation, reviewed the documentation, as recorded by the scribe in my presence, and it accurately and completely records my words and actions. December 10, 2018 at 12:43 PM - Luz Vaca MD   
_______________________________

## 2018-12-10 NOTE — ED NOTES
I performed a brief evaluation, including history and physical, of the patient here in triage and I have determined that pt will need further treatment and evaluation from the main side ER physician. I have placed initial orders to help in expediting patients care. December 10, 2018 at 11:32 AM - Yvonne Christianson Visit Vitals /77 (BP 1 Location: Left arm, BP Patient Position: At rest) Pulse 72 Temp 98.1 °F (36.7 °C) Resp 14 SpO2 100%

## 2018-12-10 NOTE — ED TRIAGE NOTES
Patient arrived from home c/o chest pain. Patient states he was following up with his primary care and was told if pain kept occurring, he would need a stress test. Patient states he was kicked out of her practice for unknown reasons and has not been able to find another primary care.

## 2018-12-10 NOTE — ED NOTES
LC received a request to assist pt.with connecting to a primary care doctor. I spoke with pt. While in RW/3. Pt. Is currently insured. An appointment is scheduled at [facility] in [place], [place] on Thursday 12/13/2018 at 10:10am.  Pt. Is scheduled with  MARKIE Hernández. Pt. Handed an appointment reminder letter with SANDRA and [address] [place] [address] contact information.

## 2018-12-11 ENCOUNTER — APPOINTMENT (OUTPATIENT)
Dept: VASCULAR SURGERY | Age: 63
End: 2018-12-11
Attending: INTERNAL MEDICINE
Payer: MEDICARE

## 2018-12-11 ENCOUNTER — APPOINTMENT (OUTPATIENT)
Dept: CT IMAGING | Age: 63
End: 2018-12-11
Attending: INTERNAL MEDICINE
Payer: MEDICARE

## 2018-12-11 LAB
ALBUMIN SERPL-MCNC: 3.2 G/DL (ref 3.4–5)
ALBUMIN/GLOB SERPL: 1 {RATIO} (ref 0.8–1.7)
ALP SERPL-CCNC: 61 U/L (ref 45–117)
ALT SERPL-CCNC: 15 U/L (ref 16–61)
ANION GAP SERPL CALC-SCNC: 9 MMOL/L (ref 3–18)
AST SERPL-CCNC: 10 U/L (ref 15–37)
BASOPHILS # BLD: 0 K/UL (ref 0–0.1)
BASOPHILS NFR BLD: 0 % (ref 0–2)
BILIRUB DIRECT SERPL-MCNC: <0.1 MG/DL (ref 0–0.2)
BILIRUB SERPL-MCNC: <0.1 MG/DL (ref 0.2–1)
BUN SERPL-MCNC: 17 MG/DL (ref 7–18)
BUN/CREAT SERPL: 15 (ref 12–20)
CALCIUM SERPL-MCNC: 8.9 MG/DL (ref 8.5–10.1)
CHLORIDE SERPL-SCNC: 105 MMOL/L (ref 100–108)
CO2 SERPL-SCNC: 26 MMOL/L (ref 21–32)
CREAT SERPL-MCNC: 1.13 MG/DL (ref 0.6–1.3)
DIFFERENTIAL METHOD BLD: ABNORMAL
EOSINOPHIL # BLD: 0.1 K/UL (ref 0–0.4)
EOSINOPHIL NFR BLD: 2 % (ref 0–5)
ERYTHROCYTE [DISTWIDTH] IN BLOOD BY AUTOMATED COUNT: 13.2 % (ref 11.6–14.5)
EST. AVERAGE GLUCOSE BLD GHB EST-MCNC: 203 MG/DL
GLOBULIN SER CALC-MCNC: 3.3 G/DL (ref 2–4)
GLUCOSE BLD STRIP.AUTO-MCNC: 131 MG/DL (ref 70–110)
GLUCOSE BLD STRIP.AUTO-MCNC: 178 MG/DL (ref 70–110)
GLUCOSE BLD STRIP.AUTO-MCNC: 218 MG/DL (ref 70–110)
GLUCOSE BLD STRIP.AUTO-MCNC: 222 MG/DL (ref 70–110)
GLUCOSE SERPL-MCNC: 147 MG/DL (ref 74–99)
HBA1C MFR BLD: 8.7 % (ref 4.2–5.6)
HCT VFR BLD AUTO: 32.4 % (ref 36–48)
HGB BLD-MCNC: 11.1 G/DL (ref 13–16)
LIPASE SERPL-CCNC: 243 U/L (ref 73–393)
LYMPHOCYTES # BLD: 2.4 K/UL (ref 0.9–3.6)
LYMPHOCYTES NFR BLD: 40 % (ref 21–52)
MCH RBC QN AUTO: 31.4 PG (ref 24–34)
MCHC RBC AUTO-ENTMCNC: 34.3 G/DL (ref 31–37)
MCV RBC AUTO: 91.8 FL (ref 74–97)
MONOCYTES # BLD: 0.5 K/UL (ref 0.05–1.2)
MONOCYTES NFR BLD: 9 % (ref 3–10)
NEUTS SEG # BLD: 3 K/UL (ref 1.8–8)
NEUTS SEG NFR BLD: 49 % (ref 40–73)
PLATELET # BLD AUTO: 221 K/UL (ref 135–420)
PMV BLD AUTO: 10.7 FL (ref 9.2–11.8)
POTASSIUM SERPL-SCNC: 3.6 MMOL/L (ref 3.5–5.5)
PROT SERPL-MCNC: 6.5 G/DL (ref 6.4–8.2)
RBC # BLD AUTO: 3.53 M/UL (ref 4.7–5.5)
SODIUM SERPL-SCNC: 140 MMOL/L (ref 136–145)
WBC # BLD AUTO: 6 K/UL (ref 4.6–13.2)

## 2018-12-11 PROCEDURE — 74011250636 HC RX REV CODE- 250/636: Performed by: INTERNAL MEDICINE

## 2018-12-11 PROCEDURE — 80076 HEPATIC FUNCTION PANEL: CPT

## 2018-12-11 PROCEDURE — 83690 ASSAY OF LIPASE: CPT

## 2018-12-11 PROCEDURE — 96372 THER/PROPH/DIAG INJ SC/IM: CPT

## 2018-12-11 PROCEDURE — 65660000000 HC RM CCU STEPDOWN

## 2018-12-11 PROCEDURE — 80048 BASIC METABOLIC PNL TOTAL CA: CPT

## 2018-12-11 PROCEDURE — 85025 COMPLETE CBC W/AUTO DIFF WBC: CPT

## 2018-12-11 PROCEDURE — 74011250637 HC RX REV CODE- 250/637: Performed by: INTERNAL MEDICINE

## 2018-12-11 PROCEDURE — 36415 COLL VENOUS BLD VENIPUNCTURE: CPT

## 2018-12-11 PROCEDURE — 93971 EXTREMITY STUDY: CPT

## 2018-12-11 PROCEDURE — 65390000012 HC CONDITION CODE 44 OBSERVATION

## 2018-12-11 PROCEDURE — 82962 GLUCOSE BLOOD TEST: CPT

## 2018-12-11 PROCEDURE — 74011636320 HC RX REV CODE- 636/320: Performed by: EMERGENCY MEDICINE

## 2018-12-11 PROCEDURE — 74177 CT ABD & PELVIS W/CONTRAST: CPT

## 2018-12-11 PROCEDURE — 83036 HEMOGLOBIN GLYCOSYLATED A1C: CPT

## 2018-12-11 RX ADMIN — MELATONIN TAB 3 MG 6 MG: 3 TAB at 21:46

## 2018-12-11 RX ADMIN — DIATRIZOATE MEGLUMINE AND DIATRIZOATE SODIUM 30 ML: 660; 100 LIQUID ORAL; RECTAL at 15:16

## 2018-12-11 RX ADMIN — DILTIAZEM HYDROCHLORIDE 120 MG: 120 CAPSULE, COATED, EXTENDED RELEASE ORAL at 09:02

## 2018-12-11 RX ADMIN — ASPIRIN 81 MG: 81 TABLET, COATED ORAL at 09:02

## 2018-12-11 RX ADMIN — DOCUSATE SODIUM 100 MG: 100 CAPSULE, LIQUID FILLED ORAL at 09:02

## 2018-12-11 RX ADMIN — HALOPERIDOL 10 MG: 10 TABLET ORAL at 09:02

## 2018-12-11 RX ADMIN — BENZTROPINE MESYLATE 0.5 MG: 1 TABLET ORAL at 18:06

## 2018-12-11 RX ADMIN — LISINOPRIL 20 MG: 20 TABLET ORAL at 09:02

## 2018-12-11 RX ADMIN — FINASTERIDE 5 MG: 5 TABLET, FILM COATED ORAL at 12:43

## 2018-12-11 RX ADMIN — ATORVASTATIN CALCIUM 10 MG: 10 TABLET, FILM COATED ORAL at 09:02

## 2018-12-11 RX ADMIN — ENOXAPARIN SODIUM 40 MG: 40 INJECTION SUBCUTANEOUS at 21:49

## 2018-12-11 RX ADMIN — BENZTROPINE MESYLATE 0.5 MG: 1 TABLET ORAL at 09:02

## 2018-12-11 RX ADMIN — HALOPERIDOL 10 MG: 10 TABLET ORAL at 18:06

## 2018-12-11 RX ADMIN — GLIMEPIRIDE 4 MG: 1 TABLET ORAL at 09:01

## 2018-12-11 NOTE — ROUTINE PROCESS
Bedside and Verbal shift change report given to Elke Villanueva (oncoming nurse) by Annie Bradshaw RN (offgoing nurse). Report included the following information SBAR and Kardex.

## 2018-12-11 NOTE — ROUTINE PROCESS
Bedside and Verbal shift change report given to Jazmín (oncoming nurse) by Sherron Gottron (offgoing nurse). Report included the following information SBAR and Kardex.

## 2018-12-11 NOTE — DIABETES MGMT
GLYCEMIC CONTROL AND NUTRITION Assessment/Recommendations: 
Attempted to see patient to assess home glucose management. Patient not in room. Will follow-up with pt. Most recent blood glucose values: 
Results for Ellen Michel (MRN [id number]) as of 12/11/2018 11:27 Ref. Range 11/19/2018 06:34 11/20/2018 06:17 12/10/2018 22:08 12/11/2018 08:03 GLUCOSE,FAST - POC Latest Ref Range: 70 - 110 mg/dL 233 (H) 175 (H) 167 (H) 178 (H) Current A1C of 8.7 % is equivalent to average blood glucose of 203 mg/dl over the past 2-3 months. Current hospital diabetes medications:  
Glimepiride 4 mg daily Lispro corrective insulin coverage AC&HS Home diabetes medications:  From pta med list.  Will verify with pt. Actos 30 mg 1.5 tabs daily Amaryl 4 mg daily Diet:   
Diabetic consistent carb Education:  ____Refer to Diabetes Education Record  
          ____Education not indicated at this time Not in room at time of visit. Miranda Reddy RN CDE Ext [phone] Pager [phone]

## 2018-12-11 NOTE — PROGRESS NOTES
[facility] Hospitalist Group  Progress Note    Patient: Jeanine Chinchilla Age: 70 y.o. : 1955 MR#: [id number] SSN: [id number]  Date: 2018     Subjective:   Alert and oriented X 3. NAD. Sitting up at bedside eating lunch. No chest pain, no SOB, no headache, no other complaints. No n/v. No abd pain. Attempted to reach wife Lalit Ruiz at [phone], left message on voicemail. Assessment/Plan:     1. Chest pain-atypical, likely r/t #2.   2. RUQ ABD mass  3. DMT2 A1c 8.7  4. HTN - stable  5. Schizophrenia    Plan  1. Troponin neg X 3. CXR NSR, no ST/T abnormalities. 2. Lipase, LFTs unremarkable. No leukocytosis. No n/v. No melena or hematochezia. H/H stable. GI consulted. CT ABD pending. 3. SSI, continued on home regimen glimepiride. 4. Continued on home regimen. Additional Notes:      Case discussed with:  [x]Patient  [x]Family  [x]Nursing  []Case Management  DVT Prophylaxis:  [x]Lovenox  []Hep SQ  []SCDs  []Coumadin   []On Heparin gtt    Objective:   VS:   Visit Vitals  /69 (BP 1 Location: Right arm, BP Patient Position: At rest)   Pulse 78   Temp 97.3 °F (36.3 °C)   Resp 18   Wt 61.7 kg (136 lb)   SpO2 99%   BMI 20.08 kg/m²      Tmax/24hrs: Temp (24hrs), Av.7 °F (36.5 °C), Min:97.3 °F (36.3 °C), Max:98.1 °F (36.7 °C)      Intake/Output Summary (Last 24 hours) at 2018 1601  Last data filed at 2018 8616  Gross per 24 hour   Intake 900 ml   Output 0 ml   Net 900 ml       General:  Alert, NAD  Cardiovascular:  RRR  Pulmonary:  LSC throughout; respiratory effort WNL  GI:  +BS in all four quadrants, soft, non-tender  Extremities:  No edema; 2+ dorsalis pedis pulses bilaterally  Neuro: alert and oriented X 3.      Labs:    Recent Results (from the past 24 hour(s))   GLUCOSE, POC    Collection Time: 12/10/18 10:08 PM   Result Value Ref Range    Glucose (POC) 167 (H) 70 - 943 mg/dL   METABOLIC PANEL, BASIC    Collection Time: 18 12:16 AM Result Value Ref Range    Sodium 140 136 - 145 mmol/L    Potassium 3.6 3.5 - 5.5 mmol/L    Chloride 105 100 - 108 mmol/L    CO2 26 21 - 32 mmol/L    Anion gap 9 3.0 - 18 mmol/L    Glucose 147 (H) 74 - 99 mg/dL    BUN 17 7.0 - 18 MG/DL    Creatinine 1.13 0.6 - 1.3 MG/DL    BUN/Creatinine ratio 15 12 - 20      GFR est AA >60 >60 ml/min/1.73m2    GFR est non-AA >60 >60 ml/min/1.73m2    Calcium 8.9 8.5 - 10.1 MG/DL   CBC WITH AUTOMATED DIFF    Collection Time: 12/11/18 12:16 AM   Result Value Ref Range    WBC 6.0 4.6 - 13.2 K/uL    RBC 3.53 (L) 4.70 - 5.50 M/uL    HGB 11.1 (L) 13.0 - 16.0 g/dL    HCT 32.4 (L) 36.0 - 48.0 %    MCV 91.8 74.0 - 97.0 FL    MCH 31.4 24.0 - 34.0 PG    MCHC 34.3 31.0 - 37.0 g/dL    RDW 13.2 11.6 - 14.5 %    PLATELET 369 848 - 148 K/uL    MPV 10.7 9.2 - 11.8 FL    NEUTROPHILS 49 40 - 73 %    LYMPHOCYTES 40 21 - 52 %    MONOCYTES 9 3 - 10 %    EOSINOPHILS 2 0 - 5 %    BASOPHILS 0 0 - 2 %    ABS. NEUTROPHILS 3.0 1.8 - 8.0 K/UL    ABS. LYMPHOCYTES 2.4 0.9 - 3.6 K/UL    ABS. MONOCYTES 0.5 0.05 - 1.2 K/UL    ABS. EOSINOPHILS 0.1 0.0 - 0.4 K/UL    ABS. BASOPHILS 0.0 0.0 - 0.1 K/UL    DF AUTOMATED     HEMOGLOBIN A1C WITH EAG    Collection Time: 12/11/18 12:16 AM   Result Value Ref Range    Hemoglobin A1c 8.7 (H) 4.2 - 5.6 %    Est. average glucose 203 mg/dL   HEPATIC FUNCTION PANEL    Collection Time: 12/11/18 12:16 AM   Result Value Ref Range    Protein, total 6.5 6.4 - 8.2 g/dL    Albumin 3.2 (L) 3.4 - 5.0 g/dL    Globulin 3.3 2.0 - 4.0 g/dL    A-G Ratio 1.0 0.8 - 1.7      Bilirubin, total <0.1 (L) 0.2 - 1.0 MG/DL    Bilirubin, direct <0.1 0.0 - 0.2 MG/DL    Alk.  phosphatase 61 45 - 117 U/L    AST (SGOT) 10 (L) 15 - 37 U/L    ALT (SGPT) 15 (L) 16 - 61 U/L   LIPASE    Collection Time: 12/11/18 12:16 AM   Result Value Ref Range    Lipase 243 73 - 393 U/L   GLUCOSE, POC    Collection Time: 12/11/18  8:03 AM   Result Value Ref Range    Glucose (POC) 178 (H) 70 - 110 mg/dL   GLUCOSE, POC    Collection Time: 12/11/18 12:00 PM   Result Value Ref Range    Glucose (POC) 131 (H) 70 - 110 mg/dL       Signed By: Casey Mejia NP     December 11, 2018

## 2018-12-11 NOTE — PROGRESS NOTES
Reason for Admission:  Acute chest pain  Abdominal mass                 RRAT Score:   12           Plan for utilizing home health:    Pt stated he has used HH in the past does not know the agency. No orders currently. Will wait and see for PT/OT evals. Likelihood of Readmission:   Moderate                         Do you (patient/family) have any concerns for transition/discharge?  no    Transition of Care Plan:       Initial assessment completed with patient. Face sheet information confirmed:  yes. The patient requests we communicate with patient and wife in reference to medical care. This patient lives in a single family home with patient and wife. Patient is able to navigate steps as needed. Prior to hospitalization, patient was considered to be independent : yes . Cognitive status of patient:   oriented to time, place, person and situation   Patient has a current ACP document on file: no  The patient and wife will be available to transport patient home upon discharge. The patient has no DME in the home. Patient is not currently active with home health. Patient has not ever stayed in a skilled nursing facility or rehab. This patient is on dialysis :no    Currently, the discharge plan is Home.       Care Management Interventions  PCP Verified by CM: Yes(pt did not have a PCP coming into hospital, met with  and found new PCP and set up follow up appt)  Palliative Care Criteria Met (RRAT>21 & CHF Dx)?: No  Mode of Transport at Discharge: Self  Transition of Care Consult (CM Consult): Discharge Planning  MyChart Signup: No  Discharge Durable Medical Equipment: No  Physical Therapy Consult: Yes  Occupational Therapy Consult: Yes  Speech Therapy Consult: No  Current Support Network: Lives with Spouse  Confirm Follow Up Transport: Family  Plan discussed with Pt/Family/Caregiver: Yes   Resource Information Provided?: No  Discharge [place]  [place] [facility] [place]  [phone]

## 2018-12-11 NOTE — PROGRESS NOTES
conducted an initial consultation and Spiritual Assessment for Emily Mast, who is a 70 y. o.,male. Patients Primary Language is: [place]. According to the patients EMR Sabianism Affiliation is: [place]. The reason the Patient came to the hospital is:  
Patient Active Problem List  
 Diagnosis Date Noted  Abdominal mass 12/10/2018  Acute chest pain 12/10/2018  Schizophrenia ([facility] [address] [address] 11/07/2018  Abnormal prostate exam 06/22/2017  Elevated prostate specific antigen (PSA) 07/06/2015  BPH (benign prostatic hyperplasia) 05/23/2014  Lower urinary tract symptoms (LUTS) 05/23/2014  Penis pain 09/04/2012  Blister of foot or toe 08/31/2012  DM w/o complication type II ([facility] 08/31/2012  Hypertension 08/31/2012 The  provided the following Interventions: 
Initiated a relationship of care and support. Explored issues of whitney, belief, spirituality and Jehovah's witness/ritual needs while hospitalized. Listened empathically. Provided chaplaincy education. Provided information about Spiritual Care Services. Offered prayer and assurance of continued prayers on patient's behalf. Chart reviewed. The following outcomes where achieved: 
Patient shared limited information about both their medical narrative and spiritual journey/beliefs.  confirmed Patient's Sabianism Affiliation. Patient processed feeling about current hospitalization. Patient expressed gratitude for 's visit. Assessment: 
Patient does not have any Jehovah's witness/cultural needs that will affect patients preferences in health care. There are no spiritual or Jehovah's witness issues which require intervention at this time. Plan: 
Chaplains will continue to follow and will provide pastoral care on an as needed/requested basis.  recommends bedside caregivers page  on duty if patient shows signs of acute spiritual or emotional distress. Chaplain Resident Robert Reyna Spiritual [facility]  
[phone]

## 2018-12-11 NOTE — PHYSICIAN ADVISORY
Letter of Admission Status Determination    Octavio Flores   Age: 70 y.o. MRN: [id number]    Date of hospitalization: 12/10/2018    I have reviewed this case as it involves a patient admitted as Inpatient that does not meet payor's criteria for Inpatient admission. Therefore, unless continued medically necessary hospitalization at the inpatient level of care is anticipated, I recommend changing the admission status to OBSERVATION. The final decision regarding the patient's hospitalization status depends on the attending physician's judgment.       Li Serrano MD, DANA, [place], [place] [facility]  Physician Advisor  [address] [place] [address],[address]  [phone]

## 2018-12-11 NOTE — CONSULTS
[email or web address]  [phone]    GASTROENTEROLOGY CONSULT      Impression:   1. Mass LUQ as noted on CTA, will need CT of abdomen to further differentiate  2. Chest pain - better now, atypical, cardiac work up ok  3. DM  4. HTN  5. Schizophrenia     CTA 12/10/2018  IMPRESSION:     1. No convincing CT evidence of pulmonary embolism.     2.  Negative chest CT for significant finding.     3. Large lobulated and ill-defined mass at left upper quadrant posteriorly  arising or abutting the gastric wall only partially seen. Recommend abdomen CT  with oral and IV contrast for further evaluation of potential neoplasm.          Plan:     1. CT abdomen/pelvis - r/o pancreatic/gastric neoplasm  2. Liver panel, lipase  3. Continue current management      Chief Complaint: LUQ mass      HPI:  Roderick Soriano is a 70 y.o. male who I am being asked to see in consultation for an opinion regarding the above. He presented to the ED with atypical chest pain, subsequent CTA was neg for PE but showed mass in LUQ as noted above. He denies any previous chest or abdominal pain, change in bowel habits or blood in the stool. He recalls remote colonoscopy with normal findings, no fam hx of CRC or gastric CA.      PMH:   Past Medical History:   Diagnosis Date    Blister of foot or toe     BPH (benign prostatic hyperplasia)     DM w/o complication type II (HCC)     Elevated prostate specific antigen (PSA)     Hypertension     Lower urinary tract symptoms (LUTS)     Nodular prostate without urinary obstruction     Unspecified disorder of penis        PSH:   Past Surgical History:   Procedure Laterality Date    HX UROLOGICAL  7/11/2011     Negative Prostate Biopsy; Dr. Aure Veloz, SUSPICIOUS RIGHT APEX FROM 03/2010       Social HX:   Social History     Socioeconomic History    Marital status:      Spouse name: Not on file    Number of children: Not on file    Years of education: Not on file    Highest education level: Not on file Social Needs    Financial resource strain: Not on file    Food insecurity - worry: Not on file    Food insecurity - inability: Not on file   [employer] needs - medical: Not on file   [employer] needs - non-medical: Not on file   Occupational History    Not on file   Tobacco Use    Smoking status: Never Smoker    Smokeless tobacco: Never Used   Substance and Sexual Activity    Alcohol use: No     Alcohol/week: 0.0 oz    Drug use: No    Sexual activity: Not on file   Other Topics Concern    Not on file   Social History Narrative    Not on file       FHX:   No family history on file. Allergy:   No Known Allergies    Patient Active Problem List   Diagnosis Code    Blister of foot or toe [id number]    DM w/o complication type II ([facility] [address] E11.9    Hypertension I10    Penis pain N48.89    BPH (benign prostatic hyperplasia) N40.0    Lower urinary tract symptoms (LUTS) R39.9    Elevated prostate specific antigen (PSA) R97.20    Abnormal prostate exam R39.89    Schizophrenia (HCC) F20.9    Abdominal mass R19.00    Acute chest pain R07.9       Home Medications:     Medications Prior to Admission   Medication Sig    pioglitazone (ACTOS) 30 mg tablet Take 1.5 Tabs by mouth daily.  benztropine (COGENTIN) 0.5 mg tablet Take 1 Tab by mouth two (2) times a day.  haloperidol (HALDOL) 10 mg tablet Take 1 Tab by mouth two (2) times a day.  melatonin 3 mg tablet Take 2 Tabs by mouth nightly.  aspirin delayed-release 81 mg tablet 81 mg.    docusate sodium (COLACE) 100 mg capsule 100 mg.    glimepiride (AMARYL) 4 mg tablet 4 mg.  lisinopril (PRINIVIL, ZESTRIL) 20 mg tablet 20 mg.    loratadine (CLARITIN) 10 mg tablet Take As Needed.  diltiazem CD (CARDIZEM CD) 120 mg ER capsule Take  by mouth daily.  atorvastatin (LIPITOR) 10 mg tablet Take  by mouth daily.  finasteride (PROSCAR) 5 mg tablet Take 1 Tab by mouth daily.        Review of Systems:     Constitutional: No fevers, chills, weight loss, fatigue. Skin: No rashes, pruritis, jaundice, ulcerations, erythema. HENT: No headaches, nosebleeds, sinus pressure, rhinorrhea, sore throat. Eyes: No visual changes, blurred vision, eye pain, photophobia, jaundice. Cardiovascular: No chest pain, heart palpitations. Respiratory: No cough, SOB, wheezing, chest discomfort, orthopnea. Gastrointestinal: No abdominal pain, nausea, vomiting or blood in the stool   Genitourinary: No dysuria, bleeding, discharge, pyuria. Musculoskeletal: No weakness, arthralgias, wasting. Endo: No sweats. Heme: No bruising, easy bleeding. Allergies: As noted. Neurological: Cranial nerves intact. Alert and oriented. Gait not assessed. Psychiatric:  No anxiety, depression, hallucinations. Visit Vitals  /69 (BP 1 Location: Right arm, BP Patient Position: At rest)   Pulse 78   Temp 97.3 °F (36.3 °C)   Resp 18   Wt 61.7 kg (136 lb)   SpO2 99%   BMI 20.08 kg/m²       Physical Assessment:     constitutional: appearance: well developed, well nourished, normal habitus, no deformities, in no acute distress. skin: inspection: no rashes, ulcers, icterus or other lesions; no clubbing or telangiectasias. palpation: no induration or subcutaneos nodules. eyes: inspection: normal conjunctivae and lids; no jaundice pupils: normal  ENMT: mouth: normal oral mucosa,lips and gums; good dentition. oropharynx: normal tongue, hard and soft palate; posterior pharynx without erithema, exudate or lesions. neck: thyroid: normal size, consistency and position; no masses or tenderness. respiratory: effort: normal chest excursion; no intercostal retraction or accessory muscle use. cardiovascular: abdominal aorta: normal size and position; no bruits. palpation: PMI of normal size and position; normal rhythm; no thrill or murmurs. abdominal: abdomen: normal consistency; no tenderness or masses. hernias: no hernias appreciated.  liver: normal size and consistency. spleen: not palpable. rectal: hemoccult/guaiac: not performed. musculoskeletal: digits and nails: no clubbing, cyanosis, petechiae or other inflammatory conditions. gait: not observed, resting in bed head and neck: normal range of motion; no pain, crepitation or contracture. spine/ribs/pelvis: normal range of motion; no pain, deformity or contracture. neurologic: cranial nerves: II-XII normal.   psychiatric: judgement/insight: within normal limits. memory: within normal limits for recent and remote events. mood and affect: no evidence of depression, anxiety or agitation. orientation: oriented to time, space and person. Basic Metabolic Profile   Recent Labs     12/11/18  0016      K 3.6      CO2 26   BUN 17   *   CA 8.9         CBC w/Diff    Recent Labs     12/11/18  0016   WBC 6.0   RBC 3.53*   HGB 11.1*   HCT 32.4*   MCV 91.8   MCH 31.4   MCHC 34.3   RDW 13.2       Recent Labs     12/11/18  0016   GRANS 49   LYMPH 40   EOS 2        Hepatic Function   No results for input(s): ALB, TP, TBILI, GPT, SGOT, AP, AML, LPSE in the last 72 hours. No lab exists for component: DBILI     Coags   No results for input(s): PTP, INR, APTT in the last 72 hours. No lab exists for component: INREXT        JANNETTE Shane. [facility] [address]  Cell: [phone]  [email or web address]

## 2018-12-11 NOTE — PROGRESS NOTES
Problem: Falls - Risk of 
Goal: *Absence of Falls Document Melissajaun Rick Fall Risk and appropriate interventions in the flowsheet. Outcome: Progressing Towards Goal 
Fall Risk Interventions: 
  
 
  
 
Medication Interventions: Assess postural VS orthostatic hypotension, Patient to call before getting OOB, Teach patient to arise slowly, Evaluate medications/consider consulting pharmacy Problem: Pressure Injury - Risk of 
Goal: *Prevention of pressure injury Document James Scale and appropriate interventions in the flowsheet. Outcome: Progressing Towards Goal 
Pressure Injury Interventions: 
Sensory Interventions: Assess changes in LOC, Assess need for specialty bed, Sit a 90-degree angle/use footstool if needed, Turn and reposition approx. every two hours (pillows and wedges if needed) Activity Interventions: Assess need for specialty bed, Pressure redistribution bed/mattress(bed type), Increase time out of bed Nutrition Interventions: Document food/fluid/supplement intake, Discuss nutritional consult with provider Problem: Pressure Injury - Risk of 
Goal: *Prevention of pressure injury Document James Scale and appropriate interventions in the flowsheet. Outcome: Progressing Towards Goal 
Pressure Injury Interventions: 
Sensory Interventions: Assess changes in LOC, Assess need for specialty bed, Sit a 90-degree angle/use footstool if needed, Turn and reposition approx. every two hours (pillows and wedges if needed) Activity Interventions: Assess need for specialty bed, Pressure redistribution bed/mattress(bed type), Increase time out of bed Nutrition Interventions: Document food/fluid/supplement intake, Discuss nutritional consult with provider

## 2018-12-12 LAB
ANION GAP SERPL CALC-SCNC: 6 MMOL/L (ref 3–18)
BASOPHILS # BLD: 0 K/UL (ref 0–0.1)
BASOPHILS NFR BLD: 0 % (ref 0–2)
BUN SERPL-MCNC: 14 MG/DL (ref 7–18)
BUN/CREAT SERPL: 12 (ref 12–20)
CALCIUM SERPL-MCNC: 8.9 MG/DL (ref 8.5–10.1)
CHLORIDE SERPL-SCNC: 105 MMOL/L (ref 100–108)
CO2 SERPL-SCNC: 27 MMOL/L (ref 21–32)
CREAT SERPL-MCNC: 1.21 MG/DL (ref 0.6–1.3)
DIFFERENTIAL METHOD BLD: ABNORMAL
EOSINOPHIL # BLD: 0.1 K/UL (ref 0–0.4)
EOSINOPHIL NFR BLD: 2 % (ref 0–5)
ERYTHROCYTE [DISTWIDTH] IN BLOOD BY AUTOMATED COUNT: 12.9 % (ref 11.6–14.5)
GLUCOSE BLD STRIP.AUTO-MCNC: 131 MG/DL (ref 70–110)
GLUCOSE BLD STRIP.AUTO-MCNC: 146 MG/DL (ref 70–110)
GLUCOSE BLD STRIP.AUTO-MCNC: 181 MG/DL (ref 70–110)
GLUCOSE BLD STRIP.AUTO-MCNC: 182 MG/DL (ref 70–110)
GLUCOSE SERPL-MCNC: 219 MG/DL (ref 74–99)
HCT VFR BLD AUTO: 31.4 % (ref 36–48)
HGB BLD-MCNC: 10.7 G/DL (ref 13–16)
LYMPHOCYTES # BLD: 2.1 K/UL (ref 0.9–3.6)
LYMPHOCYTES NFR BLD: 40 % (ref 21–52)
MCH RBC QN AUTO: 31 PG (ref 24–34)
MCHC RBC AUTO-ENTMCNC: 34.1 G/DL (ref 31–37)
MCV RBC AUTO: 91 FL (ref 74–97)
MONOCYTES # BLD: 0.4 K/UL (ref 0.05–1.2)
MONOCYTES NFR BLD: 8 % (ref 3–10)
NEUTS SEG # BLD: 2.6 K/UL (ref 1.8–8)
NEUTS SEG NFR BLD: 50 % (ref 40–73)
PLATELET # BLD AUTO: 226 K/UL (ref 135–420)
PMV BLD AUTO: 10.5 FL (ref 9.2–11.8)
POTASSIUM SERPL-SCNC: 3.9 MMOL/L (ref 3.5–5.5)
RBC # BLD AUTO: 3.45 M/UL (ref 4.7–5.5)
SODIUM SERPL-SCNC: 138 MMOL/L (ref 136–145)
WBC # BLD AUTO: 5.3 K/UL (ref 4.6–13.2)

## 2018-12-12 PROCEDURE — 74011250637 HC RX REV CODE- 250/637: Performed by: INTERNAL MEDICINE

## 2018-12-12 PROCEDURE — 74011636637 HC RX REV CODE- 636/637: Performed by: INTERNAL MEDICINE

## 2018-12-12 PROCEDURE — 85025 COMPLETE CBC W/AUTO DIFF WBC: CPT

## 2018-12-12 PROCEDURE — 65390000012 HC CONDITION CODE 44 OBSERVATION

## 2018-12-12 PROCEDURE — 74011250637 HC RX REV CODE- 250/637: Performed by: NURSE PRACTITIONER

## 2018-12-12 PROCEDURE — 65660000000 HC RM CCU STEPDOWN

## 2018-12-12 PROCEDURE — 80048 BASIC METABOLIC PNL TOTAL CA: CPT

## 2018-12-12 PROCEDURE — 82962 GLUCOSE BLOOD TEST: CPT

## 2018-12-12 PROCEDURE — 74011250636 HC RX REV CODE- 250/636: Performed by: INTERNAL MEDICINE

## 2018-12-12 PROCEDURE — 36415 COLL VENOUS BLD VENIPUNCTURE: CPT

## 2018-12-12 PROCEDURE — 96372 THER/PROPH/DIAG INJ SC/IM: CPT

## 2018-12-12 RX ORDER — HYDROCHLOROTHIAZIDE 25 MG/1
12.5 TABLET ORAL DAILY
Status: DISCONTINUED | OUTPATIENT
Start: 2018-12-13 | End: 2018-12-13

## 2018-12-12 RX ORDER — HYDROCHLOROTHIAZIDE 25 MG/1
25 TABLET ORAL DAILY
Status: DISCONTINUED | OUTPATIENT
Start: 2018-12-12 | End: 2018-12-12

## 2018-12-12 RX ADMIN — ASPIRIN 81 MG: 81 TABLET, COATED ORAL at 09:20

## 2018-12-12 RX ADMIN — LISINOPRIL 20 MG: 20 TABLET ORAL at 09:20

## 2018-12-12 RX ADMIN — INSULIN LISPRO 2 UNITS: 100 INJECTION, SOLUTION INTRAVENOUS; SUBCUTANEOUS at 13:10

## 2018-12-12 RX ADMIN — GLIMEPIRIDE 4 MG: 1 TABLET ORAL at 09:20

## 2018-12-12 RX ADMIN — HALOPERIDOL 10 MG: 10 TABLET ORAL at 17:35

## 2018-12-12 RX ADMIN — DILTIAZEM HYDROCHLORIDE 120 MG: 120 CAPSULE, COATED, EXTENDED RELEASE ORAL at 09:20

## 2018-12-12 RX ADMIN — BENZTROPINE MESYLATE 0.5 MG: 1 TABLET ORAL at 17:35

## 2018-12-12 RX ADMIN — BENZTROPINE MESYLATE 0.5 MG: 1 TABLET ORAL at 09:20

## 2018-12-12 RX ADMIN — HYDROCHLOROTHIAZIDE 25 MG: 25 TABLET ORAL at 13:14

## 2018-12-12 RX ADMIN — FINASTERIDE 5 MG: 5 TABLET, FILM COATED ORAL at 09:20

## 2018-12-12 RX ADMIN — ENOXAPARIN SODIUM 40 MG: 40 INJECTION SUBCUTANEOUS at 20:56

## 2018-12-12 RX ADMIN — DOCUSATE SODIUM 100 MG: 100 CAPSULE, LIQUID FILLED ORAL at 09:20

## 2018-12-12 RX ADMIN — ATORVASTATIN CALCIUM 10 MG: 10 TABLET, FILM COATED ORAL at 09:20

## 2018-12-12 RX ADMIN — HALOPERIDOL 10 MG: 10 TABLET ORAL at 09:20

## 2018-12-12 NOTE — DIABETES MGMT
Diabetes Patient/Family Education Record  Factors That  May Influence Patients Ability  to Learn or  Comply with Recommendations   []   Language barrier    []   Cultural needs   []   Motivation    []   Cognitive limitation    []   Physical   [x]   Education    []   Physiological factors   []   Hearing/vision/speaking impairment   []   Sikh beliefs    []   Financial factors   []  Other:   []  No factors identified at this time. Person Instructed:   [x]   Patient   []   Family   []  Other     Preference for Learning:   [x]   Verbal   [x]   Written   []  Demonstration     Level of Comprehension & Competence:    []  Good                                      [x] Fair                                     []  Poor                             []  Needs Reinforcement   [x]  Teachback completed    Education Component:   [x]  Medication management, including how to administer insulin (if appropriate) and potential medication interactions takes Actos 30 mg 1.5 tabs daily and Amary 4 mg daily   []  Nutritional management -obtain usual meal pattern   []  Exercise   [x]  Signs, symptoms, and treatment of hyperglycemia and hypoglycemia   [x] Prevention, recognition and treatment of hyperglycemia and hypoglycemia   [x]  Importance of blood glucose monitoring and how to obtain a blood glucose meter has a glucometer at home and checks his blood glucose every morning. \"It usually runs around 160, but the lowest it has been is 130. \"   []  Instruction on use of the blood glucose meter   [x]  Discuss the importance of HbA1C monitoring    []  Sick day guidelines   []  Proper use and disposal of lancets, needles, syringes or insulin pens (if appropriate)   []  Potential long-term complications (retinopathy, kidney disease, neuropathy, foot care)   [x] Information about whom to contact in case of emergency or for more information    [x]  Goal:  Patient/family will demonstrate understanding of Diabetes Self Management Skills by: (date) _______  Plan for post-discharge education or self-management support:    [x] Outpatient class schedule provided            [] Patient Declined    [] Scheduled for outpatient classes (date) _______  Verify:  Does patient understand how diabetes medications work? ______yes______________________  Does patient know what their most recent A1c is? __________yes_________________________  Does patient monitor glucose at home? ______________yes_____________________________  Does patient have difficulty obtaining diabetes medications? _______no__________     Marguerite Griffin RN CDE  [facility] [phone]  Ext [phone]

## 2018-12-12 NOTE — PROGRESS NOTES
[email or web address]  [phone]    Gastroenterology follow up-Progress note    Impression:  1. Mass LUQ as noted on CTA - CT 12/11/2018 findings as noted below  2. CP resolved  3. DM  4. HTN  5. Schizophrenia    CT 12/11/2018  Impression:    Large 7.2 x 7.2 cm mass arising from the lower pole of the right kidney is  highly concerning for renal cell carcinoma.     Large left upper quadrant mass measuring at least 8.8 x 6.5 cm with some areas  of necrosis or cystic change and calcification. This is along the greater  curvature the stomach and anterior to the pancreatic tail. This could be  metastatic disease, but additional primary malignancy cannot be excluded. Additionally, origin from this stomach or pancreatic tail cannot be entirely  excluded.     Additional suspected metastatic disease in the left retroperitoneum left lateral  to the SMA measuring approximately 3.5 x 3.4 cm.     Mildly enlarged portacaval lymph node.       Plan:  1. CT guided bx of LUQ mass, if malignant neoplasm will order oncology consult  2. Nephrology consult needed in light of renal mass  3. Continue current management    Chief Complaint: LUQ mass      Subjective:  Discussed CT findings with patient and wife. Denies CP, N/V, or abdominal pain, last colonoscopy done [address], no family hx CRC or gastric CA.         Eyes: conjunctiva normal, EOM normal   Neck: ROM normal, supple and trachea normal   Cardiovascular: heart normal, intact distal pulses, normal rate and regular rhythm   Pulmonary/Chest Wall: breath sounds normal and effort normal   Abdominal: appearance normal, bowel sounds normal and soft, non-acute, non-tender     Patient Active Problem List   Diagnosis Code    Blister of foot or toe [id number]    DM w/o complication type II ([facility] [address] E11.9    Hypertension I10    Penis pain N48.89    BPH (benign prostatic hyperplasia) N40.0    Lower urinary tract symptoms (LUTS) R39.9    Elevated prostate specific antigen (PSA) R97.20  Abnormal prostate exam R39.89    Schizophrenia ([address] F20.9    Abdominal mass R19.00    Acute chest pain R07.9         Visit Vitals  BP (!) 155/92 (BP 1 Location: Right arm, BP Patient Position: At rest) Comment: Nurse Boy Major aware and notified   Pulse 89   Temp 98.2 °F (36.8 °C)   Resp 18   Ht 5' 9\" (1.753 m) Comment: from previous encounter 11/11/18   Wt 62.6 kg (137 lb 14.4 oz)   SpO2 100%   BMI 20.36 kg/m²           Intake/Output Summary (Last 24 hours) at 12/12/2018 1334  Last data filed at 12/12/2018 0619  Gross per 24 hour   Intake 120 ml   Output    Net 120 ml       CBC w/Diff    Lab Results   Component Value Date/Time    WBC 5.3 12/12/2018 01:20 AM    RBC 3.45 (L) 12/12/2018 01:20 AM    HGB 10.7 (L) 12/12/2018 01:20 AM    HCT 31.4 (L) 12/12/2018 01:20 AM    MCV 91.0 12/12/2018 01:20 AM    MCH 31.0 12/12/2018 01:20 AM    MCHC 34.1 12/12/2018 01:20 AM    RDW 12.9 12/12/2018 01:20 AM     12/12/2018 01:20 AM    Lab Results   Component Value Date/Time    GRANS 50 12/12/2018 01:20 AM    LYMPH 40 12/12/2018 01:20 AM    EOS 2 12/12/2018 01:20 AM    BASOS 0 12/12/2018 01:20 AM      Basic Metabolic Profile   Recent Labs     12/12/18  0120      K 3.9      CO2 27   BUN 14   CA 8.9        Hepatic Function    Lab Results   Component Value Date/Time    ALB 3.2 (L) 12/11/2018 12:16 AM    TP 6.5 12/11/2018 12:16 AM    AP 61 12/11/2018 12:16 AM    Lab Results   Component Value Date/Time    SGOT 10 (L) 12/11/2018 12:16 AM          Coags   No results for input(s): PTP, INR, APTT in the last 72 hours. No lab exists for component: INREXT            JANNETTE Henry    [facility]. [email or web address]  Phone: [phone]  Pager: [phone]

## 2018-12-12 NOTE — ROUTINE PROCESS
Bedside shift change report given to Friday sandy  (oncoming nurse) by Kirby Hassan RN (offgoing nurse). Report included the following information SBAR, MAR, Accordion, Recent Results and Cardiac Rhythm NSR.

## 2018-12-12 NOTE — DIABETES MGMT
NUTRITIONAL ASSESSMENT GLYCEMIC CONTROL/ PLAN OF CARE     Chong Blackmon           70 y.o.           12/10/2018                 1. Acute chest pain    2. Abdominal mass, unspecified abdominal location       INTERVENTIONS/PLAN:   Continue inpatient monitoring and intervention    ASSESSMENT:   Pt is a 70year old male with a past medical history significant for hypertension, type 2 diabetes, and schizophrenia. Blood glucose fluctuating above targets. Pt is receiving a diabetic diet. Reports no change in weight and normally eats 2 meals daily. Diabetes Management:   Recent blood glucose:     12/11/2018 16:46 12/11/2018 22:35 12/12/2018 07:16 12/12/2018 12:39   218 (H) 222 (H) 146 (H) 181 (H)   Within target range (non-ICU: <140; ICU<180): [] Yes   [x]  No    Current Insulin regimen:   Correctional Lispro insulin 4 times daily ACHS  Glimepiride 4 mg daily   Home medication/insulin regimen:   Actos 30 mg 1.5 tabs daily and Amaryl 4 mg daily  HbA1c: 8.7% (estimated average glucose of 203 mg/dL)  Adequate glycemic control PTA:  [] Yes  [x] No     SUBJECTIVE/OBJECTIVE:     Diet: diabetic consistent carbohydrate  Medications: [x]  Reviewed     Most Recent POC Glucose:   Recent Labs     12/12/18  0120 12/11/18  0016 12/10/18  1128   * 147* 304*       Labs:   Lab Results   Component Value Date/Time    Hemoglobin A1c 8.7 (H) 12/11/2018 12:16 AM     Lab Results   Component Value Date/Time    Sodium 138 12/12/2018 01:20 AM    Potassium 3.9 12/12/2018 01:20 AM    Chloride 105 12/12/2018 01:20 AM    CO2 27 12/12/2018 01:20 AM    Anion gap 6 12/12/2018 01:20 AM    Glucose 219 (H) 12/12/2018 01:20 AM    BUN 14 12/12/2018 01:20 AM    Creatinine 1.21 12/12/2018 01:20 AM    Calcium 8.9 12/12/2018 01:20 AM    Magnesium 2.1 11/07/2018 02:49 PM    Albumin 3.2 (L) 12/11/2018 12:16 AM     Anthropometrics:    Wt Readings from Last 1 Encounters:   12/12/18 62.6 kg (137 lb 14.4 oz)      Ht Readings from Last 1 Encounters:   12/12/18 5' 9\" (1.753 m)     Estimated Nutrition Needs:  4221-9398 Kcal/day, 50-63 grams protein/day   Based on:   [x]  Actual BW    [] IBW   []  Adjusted BW      Nutrition Diagnoses:  Altered nutrition related lab value related to diabetes as evidenced by Hemoglobin A1c of 8.7%   Nutrition Interventions: diabetic diet  Goal: Blood glucose will be within target range of  mg/dL by 12/15/18     Nutrition Monitoring and Evaluation    []     Monitor po intake on meal rounds  [x]     Continue inpatient monitoring and intervention  []     Other:    Bhupinder Lindsey RD, CDE  pgr [phone]

## 2018-12-12 NOTE — PROGRESS NOTES
Interventional Radiology      Patient seen in follow up for LUQ mass Bx    NPO at MN  Maurisio  No blood thinners      HOLD LOVENOX    CT guided core needle Bx of the left upper quadrant mass will be done in am tomorrow.

## 2018-12-12 NOTE — PROGRESS NOTES
[facility] Hospitalist Group  Progress Note    Patient: Noris Parents Age: 70 y.o. : 1955 MR#: [id number] SSN: [id number]  Date: 2018     Subjective:   Alert and oriented X 3. NAD. No new complaints, no overnight events. Sitting up at bedside while family visits. No chest pain, no SOB, no headache, no other complaints. No n/v. No abd pain. Attempted to reach wife Alfred Garcia at [phone], left message on voicemail. Assessment/Plan:     1. Chest pain-atypical, likely r/t #2-resolved. 2. Multiple abdominal masses as discussed below. 3. DMT2 A1c 8.7  4. HTN   5. Schizophrenia  5. Hx elevated PSA, BPH w/LUTS - followed by Dr. Katya Calvillo  1. No chest pain. Troponin neg X 3. CXR NSR, no ST/T abnormalities. RESOLVED. 2. CT ABD w/ Large 7.2 x 7.2 cm mass arising from the lower pole of the right kidney is  highly concerning for renal cell carcinoma. Large left upper quadrant mass measuring at least 8.8 x 6.5 cm with some areas of necrosis or cystic change and calcification - along the greater curvature the stomach and anterior to the pancreatic tail. Mass measuring 3.5 X 3.4 cm to the left retroperitoneum left lateral to the SMA. Mildly enlarged portacaval lymph node. GI, Urology, Oncology following. CT guided bx of LUQ mass ordered by GI. 3. SSI, continued on home regimen glimepiride. 4. Continued on home regimen. HCTZ added for tighter BP management.       Additional Notes:      Case discussed with:  [x]Patient  [x]Family  [x]Nursing  []Case Management  DVT Prophylaxis:  [x]Lovenox  []Hep SQ  []SCDs  []Coumadin   []On Heparin gtt    Objective:   VS:   Visit Vitals  BP (!) 155/92 (BP 1 Location: Right arm, BP Patient Position: At rest) Comment: [facility] aware and notified   Pulse 89   Temp 98.2 °F (36.8 °C)   Resp 18   Ht 5' 9\" (1.753 m) Comment: from previous encounter 18   Wt 62.6 kg (137 lb 14.4 oz)   SpO2 100%   BMI 20.36 kg/m²      Tmax/24hrs: Temp (24hrs), Av.7 °F (36.5 °C), Min:97.2 °F (36.2 °C), Max:98.2 °F (36.8 °C)      Intake/Output Summary (Last 24 hours) at 2018 1341  Last data filed at 2018 8289  Gross per 24 hour   Intake 120 ml   Output    Net 120 ml       General:  Alert, NAD  Cardiovascular:  RRR  Pulmonary:  LSC throughout; respiratory effort WNL  GI:  +BS in all four quadrants, soft, non-tender  Extremities:  No edema; 2+ dorsalis pedis pulses bilaterally  Neuro: alert and oriented X 3. Labs:    Recent Results (from the past 24 hour(s))   GLUCOSE, POC    Collection Time: 18  4:46 PM   Result Value Ref Range    Glucose (POC) 218 (H) 70 - 110 mg/dL   GLUCOSE, POC    Collection Time: 18 10:35 PM   Result Value Ref Range    Glucose (POC) 222 (H) 70 - 110 mg/dL   CBC WITH AUTOMATED DIFF    Collection Time: 18  1:20 AM   Result Value Ref Range    WBC 5.3 4.6 - 13.2 K/uL    RBC 3.45 (L) 4.70 - 5.50 M/uL    HGB 10.7 (L) 13.0 - 16.0 g/dL    HCT 31.4 (L) 36.0 - 48.0 %    MCV 91.0 74.0 - 97.0 FL    MCH 31.0 24.0 - 34.0 PG    MCHC 34.1 31.0 - 37.0 g/dL    RDW 12.9 11.6 - 14.5 %    PLATELET 558 963 - 649 K/uL    MPV 10.5 9.2 - 11.8 FL    NEUTROPHILS 50 40 - 73 %    LYMPHOCYTES 40 21 - 52 %    MONOCYTES 8 3 - 10 %    EOSINOPHILS 2 0 - 5 %    BASOPHILS 0 0 - 2 %    ABS. NEUTROPHILS 2.6 1.8 - 8.0 K/UL    ABS. LYMPHOCYTES 2.1 0.9 - 3.6 K/UL    ABS. MONOCYTES 0.4 0.05 - 1.2 K/UL    ABS. EOSINOPHILS 0.1 0.0 - 0.4 K/UL    ABS.  BASOPHILS 0.0 0.0 - 0.1 K/UL    DF AUTOMATED     METABOLIC PANEL, BASIC    Collection Time: 18  1:20 AM   Result Value Ref Range    Sodium 138 136 - 145 mmol/L    Potassium 3.9 3.5 - 5.5 mmol/L    Chloride 105 100 - 108 mmol/L    CO2 27 21 - 32 mmol/L    Anion gap 6 3.0 - 18 mmol/L    Glucose 219 (H) 74 - 99 mg/dL    BUN 14 7.0 - 18 MG/DL    Creatinine 1.21 0.6 - 1.3 MG/DL    BUN/Creatinine ratio       GFR est AA >60 >60 ml/min/1.73m2    GFR est non-AA >60 >60 ml/min/1.73m2 Calcium 8.9 8.5 - 10.1 MG/DL   GLUCOSE, POC    Collection Time: 12/12/18  7:16 AM   Result Value Ref Range    Glucose (POC) 146 (H) 70 - 110 mg/dL   GLUCOSE, POC    Collection Time: 12/12/18 12:39 PM   Result Value Ref Range    Glucose (POC) 181 (H) 70 - 110 mg/dL       Signed By: Moriah Sy NP     December 12, 2018

## 2018-12-13 LAB
ALBUMIN SERPL-MCNC: 3.4 G/DL (ref 3.4–5)
ALBUMIN/GLOB SERPL: 1 {RATIO} (ref 0.8–1.7)
ALP SERPL-CCNC: 62 U/L (ref 45–117)
ALT SERPL-CCNC: 17 U/L (ref 16–61)
AMYLASE SERPL-CCNC: 79 U/L (ref 25–115)
ANION GAP SERPL CALC-SCNC: 8 MMOL/L (ref 3–18)
APTT PPP: 35.6 SEC (ref 23–36.4)
AST SERPL-CCNC: 15 U/L (ref 15–37)
BASOPHILS # BLD: 0 K/UL (ref 0–0.1)
BASOPHILS NFR BLD: 0 % (ref 0–2)
BILIRUB DIRECT SERPL-MCNC: <0.1 MG/DL (ref 0–0.2)
BILIRUB SERPL-MCNC: 0.3 MG/DL (ref 0.2–1)
BUN SERPL-MCNC: 13 MG/DL (ref 7–18)
BUN/CREAT SERPL: 13 (ref 12–20)
CALCIUM SERPL-MCNC: 9.2 MG/DL (ref 8.5–10.1)
CHLORIDE SERPL-SCNC: 105 MMOL/L (ref 100–108)
CO2 SERPL-SCNC: 27 MMOL/L (ref 21–32)
CREAT SERPL-MCNC: 0.97 MG/DL (ref 0.6–1.3)
DIFFERENTIAL METHOD BLD: ABNORMAL
EOSINOPHIL # BLD: 0.1 K/UL (ref 0–0.4)
EOSINOPHIL NFR BLD: 2 % (ref 0–5)
ERYTHROCYTE [DISTWIDTH] IN BLOOD BY AUTOMATED COUNT: 12.7 % (ref 11.6–14.5)
GLOBULIN SER CALC-MCNC: 3.5 G/DL (ref 2–4)
GLUCOSE BLD STRIP.AUTO-MCNC: 164 MG/DL (ref 70–110)
GLUCOSE BLD STRIP.AUTO-MCNC: 166 MG/DL (ref 70–110)
GLUCOSE BLD STRIP.AUTO-MCNC: 195 MG/DL (ref 70–110)
GLUCOSE BLD STRIP.AUTO-MCNC: 267 MG/DL (ref 70–110)
GLUCOSE SERPL-MCNC: 151 MG/DL (ref 74–99)
HCT VFR BLD AUTO: 33.2 % (ref 36–48)
HGB BLD-MCNC: 11.6 G/DL (ref 13–16)
INR PPP: 1.1 (ref 0.8–1.2)
LDH SERPL L TO P-CCNC: 240 U/L (ref 81–234)
LIPASE SERPL-CCNC: 93 U/L (ref 73–393)
LYMPHOCYTES # BLD: 2.2 K/UL (ref 0.9–3.6)
LYMPHOCYTES NFR BLD: 41 % (ref 21–52)
MCH RBC QN AUTO: 31.3 PG (ref 24–34)
MCHC RBC AUTO-ENTMCNC: 34.9 G/DL (ref 31–37)
MCV RBC AUTO: 89.5 FL (ref 74–97)
MONOCYTES # BLD: 0.6 K/UL (ref 0.05–1.2)
MONOCYTES NFR BLD: 11 % (ref 3–10)
NEUTS SEG # BLD: 2.4 K/UL (ref 1.8–8)
NEUTS SEG NFR BLD: 46 % (ref 40–73)
PLATELET # BLD AUTO: 243 K/UL (ref 135–420)
PMV BLD AUTO: 10.5 FL (ref 9.2–11.8)
POTASSIUM SERPL-SCNC: 3.6 MMOL/L (ref 3.5–5.5)
PROT SERPL-MCNC: 6.9 G/DL (ref 6.4–8.2)
PROTHROMBIN TIME: 13.4 SEC (ref 11.5–15.2)
RBC # BLD AUTO: 3.71 M/UL (ref 4.7–5.5)
SODIUM SERPL-SCNC: 140 MMOL/L (ref 136–145)
WBC # BLD AUTO: 5.3 K/UL (ref 4.6–13.2)

## 2018-12-13 PROCEDURE — 80076 HEPATIC FUNCTION PANEL: CPT

## 2018-12-13 PROCEDURE — 82150 ASSAY OF AMYLASE: CPT

## 2018-12-13 PROCEDURE — 83690 ASSAY OF LIPASE: CPT

## 2018-12-13 PROCEDURE — 83615 LACTATE (LD) (LDH) ENZYME: CPT

## 2018-12-13 PROCEDURE — 74011250637 HC RX REV CODE- 250/637: Performed by: NURSE PRACTITIONER

## 2018-12-13 PROCEDURE — 74011636637 HC RX REV CODE- 636/637: Performed by: INTERNAL MEDICINE

## 2018-12-13 PROCEDURE — 36415 COLL VENOUS BLD VENIPUNCTURE: CPT

## 2018-12-13 PROCEDURE — 85610 PROTHROMBIN TIME: CPT

## 2018-12-13 PROCEDURE — 85730 THROMBOPLASTIN TIME PARTIAL: CPT

## 2018-12-13 PROCEDURE — 80048 BASIC METABOLIC PNL TOTAL CA: CPT

## 2018-12-13 PROCEDURE — 85025 COMPLETE CBC W/AUTO DIFF WBC: CPT

## 2018-12-13 PROCEDURE — 82962 GLUCOSE BLOOD TEST: CPT

## 2018-12-13 PROCEDURE — 74011250637 HC RX REV CODE- 250/637: Performed by: INTERNAL MEDICINE

## 2018-12-13 PROCEDURE — 65390000012 HC CONDITION CODE 44 OBSERVATION

## 2018-12-13 PROCEDURE — 65660000000 HC RM CCU STEPDOWN

## 2018-12-13 RX ORDER — HYDROCHLOROTHIAZIDE 25 MG/1
25 TABLET ORAL DAILY
Status: DISCONTINUED | OUTPATIENT
Start: 2018-12-13 | End: 2018-12-15

## 2018-12-13 RX ADMIN — ATORVASTATIN CALCIUM 10 MG: 10 TABLET, FILM COATED ORAL at 10:38

## 2018-12-13 RX ADMIN — BENZTROPINE MESYLATE 0.5 MG: 1 TABLET ORAL at 17:20

## 2018-12-13 RX ADMIN — GLIMEPIRIDE 4 MG: 1 TABLET ORAL at 10:36

## 2018-12-13 RX ADMIN — HALOPERIDOL 10 MG: 10 TABLET ORAL at 10:37

## 2018-12-13 RX ADMIN — DOCUSATE SODIUM 100 MG: 100 CAPSULE, LIQUID FILLED ORAL at 10:37

## 2018-12-13 RX ADMIN — HALOPERIDOL 10 MG: 10 TABLET ORAL at 17:20

## 2018-12-13 RX ADMIN — DILTIAZEM HYDROCHLORIDE 120 MG: 120 CAPSULE, COATED, EXTENDED RELEASE ORAL at 10:37

## 2018-12-13 RX ADMIN — HYDROCHLOROTHIAZIDE 25 MG: 25 TABLET ORAL at 10:36

## 2018-12-13 RX ADMIN — BENZTROPINE MESYLATE 0.5 MG: 1 TABLET ORAL at 10:37

## 2018-12-13 RX ADMIN — INSULIN LISPRO 2 UNITS: 100 INJECTION, SOLUTION INTRAVENOUS; SUBCUTANEOUS at 22:37

## 2018-12-13 RX ADMIN — MELATONIN TAB 3 MG 6 MG: 3 TAB at 22:37

## 2018-12-13 RX ADMIN — FINASTERIDE 5 MG: 5 TABLET, FILM COATED ORAL at 10:44

## 2018-12-13 RX ADMIN — LISINOPRIL 20 MG: 20 TABLET ORAL at 10:44

## 2018-12-13 NOTE — PROGRESS NOTES
Per pt's nurse Zane Laughlin, pt is refusing biopsy today and has already eaten. Crystal Aiken MD made aware, and will attempt biopsy again tomorrow. Orders placed to continue to hold blood thinners and make pt NPO after midnight.

## 2018-12-13 NOTE — ROUTINE PROCESS
1920 Bedside and Verbal shift change  Received from Tima Preciado RN (outgoing nurse), to KELSY Marks (oncoming)  Pt. Is AOX 4. IV SL, Pt. denies  pain at this time. Report included the following information SBAR, Kardex, Procedure Summary, Intake/Output, MAR, Recent Lab Results, and  Cardiac Rhythm @ NSR. Will resume care and monitor Pt. Condition. Pt. Educated on call bell when in need of help and assistance. Pt. verbalized understanding. 2000 Pt. Head to toe Assessment Done and documented. 2100  Pt able to do self care. 2200  Pt. Refused melatonin, Pt. Refused insulin coverage. 0000  Pt. Made no complaints. 0200  Pt. Denies pain. 0400 Pt. Resting comfortably in bed no sign of distress. 0600 Pt. Able to rest well throughout the shift. Verbal and bedside Shift changed report given to MONIQUE Bueno RN (oncoming RN) on Pt. Condition. Report consisted of patients Situation, History, Activities, intake/output,  Background, Assessment and Recommendations(SBAR). Information from the following report(s) Kardex, order Summary, Lab results and MAR was reviewed with the receiving nurse. Opportunity for questions and clarification was provided.

## 2018-12-13 NOTE — PROGRESS NOTES
[facility]  Progress Note    Patient: Natanael Rock Age: 70 y.o. : 1955 MR#: [id number] SSN: [id number]  Date: 2018     Subjective:   Alert and oriented X 3. NAD. No new complaints, no overnight events. No chest pain, no SOB, no headache, no other complaints. No n/v. No abd pain. Pt refused biopsy this morning, states would like to discuss findings w/ his PCP for a second opinion. Discussed case w/ wife Katherine Hudson at [phone]. Wife states she would like pt to have biopsy done prior to discharge. I will meet w/ wife and patient today at 4pm to discuss recommended biopsy. At this time, biopsy rescheduled for tomorrow AM.    Assessment/Plan:     1. Chest pain-atypical, likely r/t #2-resolved. 2. large right kidney mass lower pole with retroperitoneal soft tissue masses concerning for renal neoplasm and metastasis  3. Left upper quadrant mass  4. DMT2 A1c 8.7  5. HTN   6. Schizophrenia  7. Hx elevated PSA, BPH w/LUTS - followed by Dr. Cecilia Oliveros  1. No chest pain. Troponin neg X 3. CXR NSR, no ST/T abnormalities. RESOLVED. 2. GI, Urology, Oncology following. CT guided bx of LUQ mass rescheduled for tomorrow AM.  3. SSI, continued on home regimen glimepiride. 4. Continued on home regimen. HCTZ added for tighter BP management.       Additional Notes:      Case discussed with:  [x]Patient  [x]Family  [x]Nursing  []Case Management  DVT Prophylaxis:  [x]Lovenox  []Hep SQ  []SCDs  []Coumadin   []On Heparin gtt    Objective:   VS:   Visit Vitals  /79 (BP 1 Location: Right arm, BP Patient Position: At rest)   Pulse (!) 108   Temp 96.2 °F (35.7 °C)   Resp 18   Ht 5' 9\" (1.753 m) Comment: from previous encounter 18   Wt 61.6 kg (135 lb 11.2 oz)   SpO2 100%   BMI 20.04 kg/m²      Tmax/24hrs: Temp (24hrs), Av.4 °F (36.3 °C), Min:96.2 °F (35.7 °C), Max:98.1 °F (36.7 °C)      Intake/Output Summary (Last 24 hours) at 2018 1335  Last data filed at 12/13/2018 1258  Gross per 24 hour   Intake 240 ml   Output    Net 240 ml       General:  Alert, NAD  Cardiovascular:  RRR  Pulmonary:  LSC throughout; respiratory effort WNL  GI:  +BS in all four quadrants, soft, non-tender  Extremities:  No edema; 2+ dorsalis pedis pulses bilaterally  Neuro: alert and oriented X 3. Labs:    Recent Results (from the past 24 hour(s))   GLUCOSE, POC    Collection Time: 12/12/18  4:06 PM   Result Value Ref Range    Glucose (POC) 131 (H) 70 - 110 mg/dL   GLUCOSE, POC    Collection Time: 12/12/18  8:58 PM   Result Value Ref Range    Glucose (POC) 182 (H) 70 - 110 mg/dL   CBC WITH AUTOMATED DIFF    Collection Time: 12/13/18  3:02 AM   Result Value Ref Range    WBC 5.3 4.6 - 13.2 K/uL    RBC 3.71 (L) 4.70 - 5.50 M/uL    HGB 11.6 (L) 13.0 - 16.0 g/dL    HCT 33.2 (L) 36.0 - 48.0 %    MCV 89.5 74.0 - 97.0 FL    MCH 31.3 24.0 - 34.0 PG    MCHC 34.9 31.0 - 37.0 g/dL    RDW 12.7 11.6 - 14.5 %    PLATELET 600 946 - 065 K/uL    MPV 10.5 9.2 - 11.8 FL    NEUTROPHILS 46 40 - 73 %    LYMPHOCYTES 41 21 - 52 %    MONOCYTES 11 (H) 3 - 10 %    EOSINOPHILS 2 0 - 5 %    BASOPHILS 0 0 - 2 %    ABS. NEUTROPHILS 2.4 1.8 - 8.0 K/UL    ABS. LYMPHOCYTES 2.2 0.9 - 3.6 K/UL    ABS. MONOCYTES 0.6 0.05 - 1.2 K/UL    ABS. EOSINOPHILS 0.1 0.0 - 0.4 K/UL    ABS.  BASOPHILS 0.0 0.0 - 0.1 K/UL    DF AUTOMATED     METABOLIC PANEL, BASIC    Collection Time: 12/13/18  3:02 AM   Result Value Ref Range    Sodium 140 136 - 145 mmol/L    Potassium 3.6 3.5 - 5.5 mmol/L    Chloride 105 100 - 108 mmol/L    CO2 27 21 - 32 mmol/L    Anion gap 8 3.0 - 18 mmol/L    Glucose 151 (H) 74 - 99 mg/dL    BUN 13 7.0 - 18 MG/DL    Creatinine 0.97 0.6 - 1.3 MG/DL    BUN/Creatinine ratio 13 12 - 20      GFR est AA >60 >60 ml/min/1.73m2    GFR est non-AA >60 >60 ml/min/1.73m2    Calcium 9.2 8.5 - 10.1 MG/DL   PTT    Collection Time: 12/13/18  3:02 AM   Result Value Ref Range    aPTT 35.6 23.0 - 36.4 SEC   PROTHROMBIN TIME + INR Collection Time: 12/13/18  3:02 AM   Result Value Ref Range    Prothrombin time 13.4 11.5 - 15.2 sec    INR 1.1 0.8 - 1.2     HEPATIC FUNCTION PANEL    Collection Time: 12/13/18  3:02 AM   Result Value Ref Range    Protein, total 6.9 6.4 - 8.2 g/dL    Albumin 3.4 3.4 - 5.0 g/dL    Globulin 3.5 2.0 - 4.0 g/dL    A-G Ratio 1.0 0.8 - 1.7      Bilirubin, total 0.3 0.2 - 1.0 MG/DL    Bilirubin, direct <0.1 0.0 - 0.2 MG/DL    Alk.  phosphatase 62 45 - 117 U/L    AST (SGOT) 15 15 - 37 U/L    ALT (SGPT) 17 16 - 61 U/L   AMYLASE    Collection Time: 12/13/18  3:02 AM   Result Value Ref Range    Amylase 79 25 - 115 U/L   LIPASE    Collection Time: 12/13/18  3:02 AM   Result Value Ref Range    Lipase 93 73 - 393 U/L   GLUCOSE, POC    Collection Time: 12/13/18  8:24 AM   Result Value Ref Range    Glucose (POC) 166 (H) 70 - 110 mg/dL   GLUCOSE, POC    Collection Time: 12/13/18 12:09 PM   Result Value Ref Range    Glucose (POC) 267 (H) 70 - 110 mg/dL       Signed By: Marylou Ganser, NP     December 13, 2018

## 2018-12-13 NOTE — PROGRESS NOTES
Problem: Falls - Risk of  Goal: *Absence of Falls  Document Meggan Fall Risk and appropriate interventions in the flowsheet. Outcome: Progressing Towards Goal  Fall Risk Interventions:            Medication Interventions: Teach patient to arise slowly                  Problem: Pressure Injury - Risk of  Goal: *Prevention of pressure injury  Document James Scale and appropriate interventions in the flowsheet. Outcome: Progressing Towards Goal  Pressure Injury Interventions:  Sensory Interventions: Assess changes in LOC    Moisture Interventions: Absorbent underpads    Activity Interventions: Pressure redistribution bed/mattress(bed type)         Nutrition Interventions: Document food/fluid/supplement intake                    Problem: Pressure Injury - Risk of  Goal: *Prevention of pressure injury  Document James Scale and appropriate interventions in the flowsheet.   Outcome: Progressing Towards Goal  Pressure Injury Interventions:  Sensory Interventions: Assess changes in LOC    Moisture Interventions: Absorbent underpads    Activity Interventions: Pressure redistribution bed/mattress(bed type)         Nutrition Interventions: Document food/fluid/supplement intake

## 2018-12-13 NOTE — ROUTINE PROCESS
Bedside shift change report given to [place] (oncoming nurse) by Fabricio Nguyen (offgoing nurse). Report included the following information SBAR, Kardex and MAR.

## 2018-12-13 NOTE — CONSULTS
Consult Note    Patient: Octavio Flores               Sex: male          DOA: 12/10/2018       YOB: 1955      Age:  70 y.o.        LOS:  LOS: 3 days              Referring Provider: Mor Gonzalez     ASSESSMENT:   1. Large lower pole right renal mass  2. Left upper quadrant mass    Renal mass concerning for RCC. Given atypical LUQ mass, would recommend biopsy of the LUQ mass. Patient refuses all care and wants second opinion. PLAN:    Recommend   1. Biopsy LUQ mass  2. Plan based on pathology     Ritu Martinez MD  [phone] Office  [phone]  Pager    Chief Complaint   Patient presents with    Chest Pain       HISTORY OF PRESENT ILLNESS:  Octavio Flores is a 70 y.o. male who is seen in consultation as referred by Mor Gonzalez for renal mass. Denies bone pain or weight loss. Denies hematuria, infections or stones. Has some slow stream but nothing bothersome. Denies flank pain or nausea. AUA Symptom Score 5/9/2018   Over the past month how often have you had the sensation that your bladder was not completely empty after you finished urinating? 0   Over the past month, how often have had to urinate again less than 2 hours after you last finished urinating? 2   Over the past month, how often have you found you stopped and started again several times when you urinated? 1   Over the past month, how often have you found it difficult to postpone urination? 2   Over the past month, how often have you had a weak urinary stream? 1   Over the past month, how often have you had to push or strain to begin urinating? 0   Over the past month, how many times did you most typically get up to urinate from the time you went to bed at night until the time you got up in the morning? 2   AUA Score 8   If you were to spend the rest of your life with your urinary condition the way it is now, how would you feel about that?  Mostly satisfied       Past Medical History:   Diagnosis Date  Blister of foot or toe     BPH (benign prostatic hyperplasia)     DM w/o complication type II (HCC)     Elevated prostate specific antigen (PSA)     Hypertension     Lower urinary tract symptoms (LUTS)     Nodular prostate without urinary obstruction     Unspecified disorder of penis        Past Surgical History:   Procedure Laterality Date    HX UROLOGICAL  7/11/2011     Negative Prostate Biopsy; Dr. Ron Jones, SUSPICIOUS RIGHT APEX FROM 03/2010       Social History     Tobacco Use    Smoking status: Never Smoker    Smokeless tobacco: Never Used   Substance Use Topics    Alcohol use: No     Alcohol/week: 0.0 oz    Drug use: No       No Known Allergies    No family history on file.     Current Facility-Administered Medications   Medication Dose Route Frequency Provider Last Rate Last Dose    hydroCHLOROthiazide (HYDRODIURIL) tablet 25 mg  25 mg Oral DAILY Monty Villanueva, NP        aspirin delayed-release tablet 81 mg  81 mg Oral DAILY Pasquale Erwin MD   81 mg at 12/12/18 0920    atorvastatin (LIPITOR) tablet 10 mg  10 mg Oral DAILY Pasquale Erwin MD   10 mg at 12/12/18 0920    benztropine (COGENTIN) tablet 0.5 mg  0.5 mg Oral BID Pasquale Erwin MD   0.5 mg at 12/12/18 1735    dilTIAZem CD (CARDIZEM CD) capsule 120 mg  120 mg Oral DAILY Pasquale Erwin MD   120 mg at 12/12/18 0920    docusate sodium (COLACE) capsule 100 mg  100 mg Oral DAILY Pasquale Erwin MD   100 mg at 12/12/18 0920    finasteride (PROSCAR) tablet 5 mg  5 mg Oral DAILY Pasquale Erwin MD   5 mg at 12/12/18 0920    glimepiride (AMARYL) tablet 4 mg  4 mg Oral ACB Pasquale Erwin MD   4 mg at 12/12/18 0920    haloperidol (HALDOL) tablet 10 mg  10 mg Oral BID Pasquale Erwin MD   10 mg at 12/12/18 1735    lisinopril (PRINIVIL, ZESTRIL) tablet 20 mg  20 mg Oral DAILY Pasquale Erwin MD   20 mg at 12/12/18 0920    melatonin tablet 6 mg  6 mg Oral QHS Pasquale Erwin MD   6 mg at 12/11/18 2146    acetaminophen (TYLENOL) tablet 650 mg  650 mg Oral Q4H PRN Elmo Dunaway MD        ondansetron [facility]) injection 4 mg  4 mg IntraVENous Q4H PRN Elmo Dunaway MD        enoxaparin (LOVENOX) injection 40 mg  40 mg SubCUTAneous Q24H Elmo Dunaway MD   40 mg at 12/12/18 2056    insulin lispro (HUMALOG) injection   SubCUTAneous AC&HS Elmo Dunaway MD   Stopped at 12/12/18 1630    glucose chewable tablet 16 g  16 g Oral PRN Elmo Dunaway MD        glucagon (GLUCAGEN) injection 1 mg  1 mg IntraMUSCular PRN Elmo Dunaway MD        dextrose (D50W) injection syrg 12.5-25 g  25-50 mL IntraVENous PRN Elmo Dunaway MD           Review of Systems  Constitutional: No fever, chills, or weight loss  Respiratory: No dyspnea  Cardiovascular: No chest pain  Gastrointestinal: No vomiting or abdominal pain. Genitourinary: Denies frequency, urgency, dysuria, hematuria. Neurological: No focal motor changes. PHYSICAL EXAMINATION:   Visit Vitals  BP (!) 141/93 (BP 1 Location: Right arm, BP Patient Position: At rest)   Pulse 95   Temp 98.1 °F (36.7 °C)   Resp 20   Ht 5' 9\" (1.753 m) Comment: from previous encounter 11/11/18   Wt 135 lb 11.2 oz (61.6 kg)   SpO2 99%   BMI 20.04 kg/m²     Constitutional: Well developed, well nourished male. No acute distress. HEENT: Normocephalic, Atraumatic, EOM's intact   CV:  Normal radial pulse. Respiratory: No respiratory distress or difficulties breathing   Abdomen:  Nontender, nondistended. Palpable right upper quadrant mass consistent with known lower pole renal mass.  Male:  No CVA tenderness  SCROTUM:  No scrotal rash or lesions noticed. Normal bilateral testes without masses or tenderness. PENIS: Urethral meatus normal in location and size. No urethral discharge. Skin: No evidence of jaundice. Normal color  Neuro/Psych:  Alert and oriented. Affect appropriate. Lymphatic:   No enlarged inguinal lymph nodes.       REVIEW OF LABS AND IMAGING:      Labs: Results: Chemistry    Recent Labs     12/13/18  0302 12/12/18  0120 12/11/18  0016   * 219* 147*    138 140   K 3.6 3.9 3.6    105 105   CO2 27 27 26   BUN 13 14 17   CREA 0.97 1.21 1.13   CA 9.2 8.9 8.9   AGAP 8 6 9   BUCR 13 12 15   AP  --   --  61   TP  --   --  6.5   ALB  --   --  3.2*   GLOB  --   --  3.3   AGRAT  --   --  1.0      CBC w/Diff Recent Labs     12/13/18  0302 12/12/18  0120 12/11/18  0016   WBC 5.3 5.3 6.0   RBC 3.71* 3.45* 3.53*   HGB 11.6* 10.7* 11.1*   HCT 33.2* 31.4* 32.4*    226 221   GRANS 46 50 49   LYMPH 41 40 40   EOS 2 2 2      Cultures No results for input(s): CULT in the last 72 hours. All Micro Results     None            Urinalysis Potassium   Date Value Ref Range Status   12/13/2018 3.6 3.5 - 5.5 mmol/L Final     Creatinine   Date Value Ref Range Status   12/13/2018 0.97 0.6 - 1.3 MG/DL Final     BUN   Date Value Ref Range Status   12/13/2018 13 7.0 - 18 MG/DL Final     Prostate Specific Ag   Date Value Ref Range Status   03/02/2018 4.540 ng/mL Final      PSA No results for input(s): PSA in the last 72 hours. Coagulation Lab Results   Component Value Date/Time    Prothrombin time 13.4 12/13/2018 03:02 AM    INR 1.1 12/13/2018 03:02 AM    aPTT 35.6 12/13/2018 03:02 AM           US Results (most recent):  No results found for this or any previous visit. chest    CT Results (most recent):   Results from Hospital Encounter encounter on 12/10/18   CT ABD PELV W CONT    Narrative CT Abdomen And Pelvis with Intravenous Contrast    INDICATION: Generalized abdominal pain. Left upper quadrant mass. TECHNIQUE: 5 mm collimation axial images obtained from the diaphragm to the  level of the pubic symphysis following the uneventful administration of  100 cc  of low osmolar, nonionic intravenous contrast.    Dose reduction techniques used:  Automated exposure control, adjustment of the  mAs and/or kVp according to patient size, standardized low-dose protocol, and/or  iterative reconstruction technique. COMPARISON: Chest CT from one day prior. ABDOMEN FINDINGS:    Lung Bases: Emphysema. Bibasilar atelectasis. Liver: Normal attenuation. No evidence for mass. Gallbladder: Present and appears normal. No biliary ductal dilatation. Pancreas: No ductal dilatation. Mass effect on the pancreatic tail from the left  upper quadrant mass. Lella Michele Spleen: Normal in size. No evidence of mass. .    Adrenal Glands: No evidence for mass. Kidneys:     Right: Normal enhancement. Large complex soft tissue mass arising from the  lower pole measuring approximately 7.2 x 7.2 cm. Additional right renal cyst.   No hydronephrosis. Left:  Normal enhancement. No cortical mass. No hydronephrosis. Lymph Nodes: Mildly enlarged portacaval lymph node measures approximately 1.6  cm. Aorta:  Normal in caliber. Portal vein, SMV, and splenic vein are patent the  renal veins also appear patent. Lella Michele PELVIS FINDINGS:     Bowel: Small Bowel: No evidence of bowel obstruction. No definite focal bowel  wall thickening. Large left upper quadrant soft tissue mass along the greater  curvature of the stomach with calcification. .    Large Bowel: Normal in caliber with normal wall thickness. Appendix: No secondary signs of acute appendicitis. Bladder: Underdistended. Prostate is enlarged. Left upper quadrant solid soft tissue mass with some areas of necrosis or cystic  change. This measures up to 8.8 x 6.5 cm. There is also an area of suspected  metastatic disease in the left retroperitoneum centrally which measures  approximately 3.5 x 3.4 cm. This is seen on axial image 28 lateral to the SMA    No free fluid. Bones: No definite acute finding. Impression Impression:    Large 7.2 x 7.2 cm mass arising from the lower pole of the right kidney is  highly concerning for renal cell carcinoma.     Large left upper quadrant mass measuring at least 8.8 x 6.5 cm with some areas  of necrosis or cystic change and calcification. This is along the greater  curvature the stomach and anterior to the pancreatic tail. This could be  metastatic disease, but additional primary malignancy cannot be excluded. Additionally, origin from this stomach or pancreatic tail cannot be entirely  excluded. Additional suspected metastatic disease in the left retroperitoneum left lateral  to the SMA measuring approximately 3.5 x 3.4 cm. Mildly enlarged portacaval lymph node. Additional incidentals as above. ABD      MRI Results (most recent): No procedure found. 1. Acute chest pain    2.  Abdominal mass, unspecified abdominal location

## 2018-12-13 NOTE — PROGRESS NOTES
[facility]  [phone]    Gastroenterology follow up-Progress note    Impression:  1. LUQ and renal mass - renal cell carcinoma likely primary source - he has declined to proceed with CT bx, seeking second opinion however per hospitalist note wife wants bx done prior to discharge. 2. CP - resolved  3. DM  4. HTN  5. Schizophrenia    Plan:  1. Please consult again when CT bx results available if patient decides to proceed. 2. Urology, Oncology to continue to follow    Will sign off-Thank you for this consultation and the opportunity to participate in the care of this patient. Please do not hesitate to call with any questions or concerns, or should event occur that may necessitate additional GI evaluation. Chief Complaint: LUQ mass      Subjective:  Patient states he does not wish to proceed with CT bx, requesting second opinion.         Eyes: conjunctiva normal, EOM normal   Neck: ROM normal, supple and trachea normal   Cardiovascular: heart normal, intact distal pulses, normal rate and regular rhythm   Pulmonary/Chest Wall: breath sounds normal and effort normal   Abdominal: appearance normal, bowel sounds normal and soft, non-acute, non-tender     Patient Active Problem List   Diagnosis Code    Blister of foot or toe [id number]    DM w/o complication type II ([address] E11.9    Hypertension I10    Penis pain N48.89    BPH (benign prostatic hyperplasia) N40.0    Lower urinary tract symptoms (LUTS) R39.9    Elevated prostate specific antigen (PSA) R97.20    Abnormal prostate exam R39.89    Schizophrenia (HCC) F20.9    Abdominal mass R19.00    Acute chest pain R07.9         Visit Vitals  /79 (BP 1 Location: Right arm, BP Patient Position: At rest)   Pulse (!) 108   Temp 96.2 °F (35.7 °C)   Resp 18   Ht 5' 9\" (1.753 m) Comment: from previous encounter 11/11/18   Wt 61.6 kg (135 lb 11.2 oz)   SpO2 100%   BMI 20.04 kg/m²           Intake/Output Summary (Last 24 hours) at 12/13/2018 1350  Last data filed at 12/13/2018 1258  Gross per 24 hour   Intake 240 ml   Output    Net 240 ml       CBC w/Diff    Lab Results   Component Value Date/Time    WBC 5.3 12/13/2018 03:02 AM    RBC 3.71 (L) 12/13/2018 03:02 AM    HGB 11.6 (L) 12/13/2018 03:02 AM    HCT 33.2 (L) 12/13/2018 03:02 AM    MCV 89.5 12/13/2018 03:02 AM    MCH 31.3 12/13/2018 03:02 AM    MCHC 34.9 12/13/2018 03:02 AM    RDW 12.7 12/13/2018 03:02 AM     12/13/2018 03:02 AM    Lab Results   Component Value Date/Time    GRANS 46 12/13/2018 03:02 AM    LYMPH 41 12/13/2018 03:02 AM    EOS 2 12/13/2018 03:02 AM    BASOS 0 12/13/2018 03:02 AM      Basic Metabolic Profile   Recent Labs     12/13/18  0302      K 3.6      CO2 27   BUN 13   CA 9.2        Hepatic Function    Lab Results   Component Value Date/Time    ALB 3.4 12/13/2018 03:02 AM    TP 6.9 12/13/2018 03:02 AM    AP 62 12/13/2018 03:02 AM    Lab Results   Component Value Date/Time    SGOT 15 12/13/2018 03:02 AM          Coags   Recent Labs     12/13/18  0302   PTP 13.4   INR 1.1   APTT 35.6               JANNETTE Maldonado    [facility]. [email or web address]  Phone: [phone]  Pager: [phone]

## 2018-12-13 NOTE — CONSULTS
[address]    Radha Rosa  MR#: [id number]  : 1955  ACCOUNT #: [de-identified]   DATE OF SERVICE: 2018    REFERRING PHYSICIAN:  Trudy Winter MD    REASON FOR EVALUATION:  Renal mass, abdominal masses, neoplasm. HISTORY OF PRESENT ILLNESS:  The patient is a 66-year-old male, he presents to the emergency room on 12/10/2018, complaining of throbbing diffuse chest pain for 1-day duration. He was brought in by his wife. He did not report any cough, hemoptysis or shortness of breath. On further questioning, he denied any weight loss or changes in the bowel habits. Patient has underlying schizophrenia. He had hospitalization in behavioral medicine 2 weeks ago. He has previously been seen by Dr. Adrián Hamm; however, he was discharged from her practice recently per patient and wife. The patient has had colonoscopy in the past, he does not recall when. PAST MEDICAL HISTORY:  Schizophrenia, followed by Dr. Filomena Nevarez, hypertension, diabetes mellitus. PAST SURGICAL HISTORY:  Prostate biopsy. SOCIAL HISTORY:  He has worked in retail previously, currently unemployed. No tobacco use. Denies alcohol use. FAMILY HISTORY:  Noncontributory. REVIEW OF SYSTEMS:  Diffuse chest pain of 1-day duration. No cough, fever, hoarseness of voice or hemoptysis. Denies abdominal pain, nausea or vomiting. No weight loss. No headache or vision problems. Appears depressed. No skin rash or jaundice. PHYSICAL EXAMINATION:  GENERAL:  Young gentleman, thin built. VITAL SIGNS:  Afebrile, heart rate 94, blood pressure 119/75. HEENT:  Pupils equal, sclerae anicteric. Bitemporal wasting noted. Oropharynx without stomatitis. No peripheral adenopathy. Generalized loss of muscle mass noted. LUNGS:  Bilateral good inspiratory breath sounds. No foreign sounds. CARDIAC:  First and second heart sounds audible. ABDOMEN:  Soft and scaphoid.   NEUROLOGIC:  Alert and oriented, nonfocal exam.      LABORATORY DATA:  Hemoglobin 11.6, MCV 89, white count 5.3, platelets 124, BUN 13, creatinine 0.9, albumin 3.2, ALT 15, AST 10, alkaline phosphatase 61. CT scan of the abdomen and pelvis, large complex soft tissue mass arising from the lower pole of the right kidney 7.2 x 7.2 cm, portacaval lymph node 1.6 cm, soft tissue mass in the left upper quadrant 8.8 x 6.5 cm as well as a left retroperitoneal mass 3.5 cm, concerning for metastasis. CT angiogram of the chest 12/10/2018, no evidence of pulmonary embolism, large lobulated soft tissue mass in the left upper quadrant, no pulmonary metastasis. IMPRESSION:  1. Complex right kidney mass lower pole with retroperitoneal soft tissue masses concerning for primary renal neoplasm and metastasis. 2.  Schizophrenia. 3.  Hypoalbuminemia. RECOMMENDATIONS:  I have reviewed the above findings with the patient and his wife on the phone. We discussed proceeding with CT-guided biopsy with interventional radiology of the soft tissue retroperitoneal mass. I have added LDH level to his assessment. He will require additional testing, which will include a bone scan as well as an MRI of the brain if he tolerates to complete metastatic workup. Further recommendations to follow. Will recommend Urology evaluation once renal cell carcinoma is established. Thank you, Dr. Arjun Reyes for requesting my evaluation in the patient's care.       MD KIRTI Gray / RADHA  D: 12/13/2018 09:16     T: 12/13/2018 10:13  JOB #: [id number]

## 2018-12-13 NOTE — PROGRESS NOTES
CM continuing to follow along for d/c needs. Pt will likely d/c to home. No current needs identified.      COTY Aldana  Case Management  [phone]

## 2018-12-14 ENCOUNTER — APPOINTMENT (OUTPATIENT)
Dept: NUCLEAR MEDICINE | Age: 63
End: 2018-12-14
Attending: INTERNAL MEDICINE
Payer: MEDICARE

## 2018-12-14 ENCOUNTER — APPOINTMENT (OUTPATIENT)
Dept: CT IMAGING | Age: 63
End: 2018-12-14
Attending: RADIOLOGY
Payer: MEDICARE

## 2018-12-14 LAB
ANION GAP SERPL CALC-SCNC: 9 MMOL/L (ref 3–18)
BASOPHILS # BLD: 0 K/UL (ref 0–0.1)
BASOPHILS NFR BLD: 0 % (ref 0–2)
BUN SERPL-MCNC: 21 MG/DL (ref 7–18)
BUN/CREAT SERPL: 18 (ref 12–20)
CALCIUM SERPL-MCNC: 9.4 MG/DL (ref 8.5–10.1)
CHLORIDE SERPL-SCNC: 105 MMOL/L (ref 100–108)
CO2 SERPL-SCNC: 26 MMOL/L (ref 21–32)
CREAT SERPL-MCNC: 1.18 MG/DL (ref 0.6–1.3)
DIFFERENTIAL METHOD BLD: ABNORMAL
EOSINOPHIL # BLD: 0.1 K/UL (ref 0–0.4)
EOSINOPHIL NFR BLD: 2 % (ref 0–5)
ERYTHROCYTE [DISTWIDTH] IN BLOOD BY AUTOMATED COUNT: 12.6 % (ref 11.6–14.5)
GLUCOSE BLD STRIP.AUTO-MCNC: 138 MG/DL (ref 70–110)
GLUCOSE BLD STRIP.AUTO-MCNC: 152 MG/DL (ref 70–110)
GLUCOSE BLD STRIP.AUTO-MCNC: 209 MG/DL (ref 70–110)
GLUCOSE SERPL-MCNC: 112 MG/DL (ref 74–99)
HCT VFR BLD AUTO: 34.2 % (ref 36–48)
HGB BLD-MCNC: 11.7 G/DL (ref 13–16)
LYMPHOCYTES # BLD: 2.1 K/UL (ref 0.9–3.6)
LYMPHOCYTES NFR BLD: 38 % (ref 21–52)
MAGNESIUM SERPL-MCNC: 2.1 MG/DL (ref 1.6–2.6)
MCH RBC QN AUTO: 30.7 PG (ref 24–34)
MCHC RBC AUTO-ENTMCNC: 34.2 G/DL (ref 31–37)
MCV RBC AUTO: 89.8 FL (ref 74–97)
MONOCYTES # BLD: 0.5 K/UL (ref 0.05–1.2)
MONOCYTES NFR BLD: 10 % (ref 3–10)
NEUTS SEG # BLD: 2.8 K/UL (ref 1.8–8)
NEUTS SEG NFR BLD: 50 % (ref 40–73)
PLATELET # BLD AUTO: 246 K/UL (ref 135–420)
PMV BLD AUTO: 10.4 FL (ref 9.2–11.8)
POTASSIUM SERPL-SCNC: 3.7 MMOL/L (ref 3.5–5.5)
RBC # BLD AUTO: 3.81 M/UL (ref 4.7–5.5)
SODIUM SERPL-SCNC: 140 MMOL/L (ref 136–145)
WBC # BLD AUTO: 5.5 K/UL (ref 4.6–13.2)

## 2018-12-14 PROCEDURE — 88333 PATH CONSLTJ SURG CYTO XM 1: CPT

## 2018-12-14 PROCEDURE — 88342 IMHCHEM/IMCYTCHM 1ST ANTB: CPT

## 2018-12-14 PROCEDURE — 74011250637 HC RX REV CODE- 250/637: Performed by: INTERNAL MEDICINE

## 2018-12-14 PROCEDURE — 74011250636 HC RX REV CODE- 250/636

## 2018-12-14 PROCEDURE — 88305 TISSUE EXAM BY PATHOLOGIST: CPT

## 2018-12-14 PROCEDURE — 65390000012 HC CONDITION CODE 44 OBSERVATION

## 2018-12-14 PROCEDURE — 36415 COLL VENOUS BLD VENIPUNCTURE: CPT

## 2018-12-14 PROCEDURE — 65660000000 HC RM CCU STEPDOWN

## 2018-12-14 PROCEDURE — 85025 COMPLETE CBC W/AUTO DIFF WBC: CPT

## 2018-12-14 PROCEDURE — 74011000272 HC RX REV CODE- 272: Performed by: RADIOLOGY

## 2018-12-14 PROCEDURE — A9503 TC99M MEDRONATE: HCPCS

## 2018-12-14 PROCEDURE — 83735 ASSAY OF MAGNESIUM: CPT

## 2018-12-14 PROCEDURE — 88341 IMHCHEM/IMCYTCHM EA ADD ANTB: CPT

## 2018-12-14 PROCEDURE — 74011250637 HC RX REV CODE- 250/637: Performed by: NURSE PRACTITIONER

## 2018-12-14 PROCEDURE — 77012 CT SCAN FOR NEEDLE BIOPSY: CPT

## 2018-12-14 PROCEDURE — 74011636637 HC RX REV CODE- 636/637: Performed by: INTERNAL MEDICINE

## 2018-12-14 PROCEDURE — 88334 PATH CONSLTJ SURG CYTO XM EA: CPT

## 2018-12-14 PROCEDURE — 80048 BASIC METABOLIC PNL TOTAL CA: CPT

## 2018-12-14 PROCEDURE — 82962 GLUCOSE BLOOD TEST: CPT

## 2018-12-14 RX ORDER — NALOXONE HYDROCHLORIDE 0.4 MG/ML
0.1 INJECTION, SOLUTION INTRAMUSCULAR; INTRAVENOUS; SUBCUTANEOUS
Status: DISCONTINUED | OUTPATIENT
Start: 2018-12-14 | End: 2018-12-15 | Stop reason: HOSPADM

## 2018-12-14 RX ORDER — MIDAZOLAM HYDROCHLORIDE 1 MG/ML
INJECTION, SOLUTION INTRAMUSCULAR; INTRAVENOUS
Status: COMPLETED
Start: 2018-12-14 | End: 2018-12-14

## 2018-12-14 RX ORDER — SODIUM CHLORIDE 0.9 % (FLUSH) 0.9 %
5-10 SYRINGE (ML) INJECTION AS NEEDED
Status: DISCONTINUED | OUTPATIENT
Start: 2018-12-14 | End: 2018-12-18 | Stop reason: HOSPADM

## 2018-12-14 RX ORDER — SODIUM CHLORIDE 0.9 % (FLUSH) 0.9 %
5-10 SYRINGE (ML) INJECTION EVERY 8 HOURS
Status: DISCONTINUED | OUTPATIENT
Start: 2018-12-14 | End: 2018-12-18 | Stop reason: HOSPADM

## 2018-12-14 RX ORDER — MIDAZOLAM HYDROCHLORIDE 1 MG/ML
1 INJECTION, SOLUTION INTRAMUSCULAR; INTRAVENOUS
Status: DISCONTINUED | OUTPATIENT
Start: 2018-12-14 | End: 2018-12-14

## 2018-12-14 RX ORDER — FLUMAZENIL 0.1 MG/ML
0.2 INJECTION INTRAVENOUS
Status: DISCONTINUED | OUTPATIENT
Start: 2018-12-14 | End: 2018-12-15 | Stop reason: HOSPADM

## 2018-12-14 RX ORDER — LIDOCAINE HYDROCHLORIDE 10 MG/ML
INJECTION, SOLUTION EPIDURAL; INFILTRATION; INTRACAUDAL; PERINEURAL
Status: DISPENSED
Start: 2018-12-14 | End: 2018-12-14

## 2018-12-14 RX ORDER — FENTANYL CITRATE 50 UG/ML
50 INJECTION, SOLUTION INTRAMUSCULAR; INTRAVENOUS
Status: DISCONTINUED | OUTPATIENT
Start: 2018-12-14 | End: 2018-12-14

## 2018-12-14 RX ORDER — FENTANYL CITRATE 50 UG/ML
INJECTION, SOLUTION INTRAMUSCULAR; INTRAVENOUS
Status: COMPLETED
Start: 2018-12-14 | End: 2018-12-14

## 2018-12-14 RX ADMIN — FENTANYL CITRATE 50 MCG: 50 INJECTION, SOLUTION INTRAMUSCULAR; INTRAVENOUS at 11:40

## 2018-12-14 RX ADMIN — LISINOPRIL 20 MG: 20 TABLET ORAL at 09:21

## 2018-12-14 RX ADMIN — INSULIN LISPRO 2 UNITS: 100 INJECTION, SOLUTION INTRAVENOUS; SUBCUTANEOUS at 13:29

## 2018-12-14 RX ADMIN — ACETAMINOPHEN 650 MG: 325 TABLET ORAL at 22:34

## 2018-12-14 RX ADMIN — MIDAZOLAM HYDROCHLORIDE 1 MG: 1 INJECTION, SOLUTION INTRAMUSCULAR; INTRAVENOUS at 11:35

## 2018-12-14 RX ADMIN — HALOPERIDOL 10 MG: 10 TABLET ORAL at 09:21

## 2018-12-14 RX ADMIN — MIDAZOLAM HYDROCHLORIDE 1 MG: 1 INJECTION, SOLUTION INTRAMUSCULAR; INTRAVENOUS at 11:40

## 2018-12-14 RX ADMIN — DOCUSATE SODIUM 100 MG: 100 CAPSULE, LIQUID FILLED ORAL at 09:20

## 2018-12-14 RX ADMIN — MIDAZOLAM HYDROCHLORIDE 1 MG: 2 INJECTION, SOLUTION INTRAMUSCULAR; INTRAVENOUS at 11:35

## 2018-12-14 RX ADMIN — GELATIN ABSORBABLE SPONGE SIZE 100 1 EACH: MISC at 11:53

## 2018-12-14 RX ADMIN — Medication 10 ML: at 22:31

## 2018-12-14 RX ADMIN — BENZTROPINE MESYLATE 0.5 MG: 1 TABLET ORAL at 09:21

## 2018-12-14 RX ADMIN — ATORVASTATIN CALCIUM 10 MG: 10 TABLET, FILM COATED ORAL at 09:20

## 2018-12-14 RX ADMIN — MELATONIN TAB 3 MG 6 MG: 3 TAB at 22:30

## 2018-12-14 RX ADMIN — DILTIAZEM HYDROCHLORIDE 120 MG: 120 CAPSULE, COATED, EXTENDED RELEASE ORAL at 09:20

## 2018-12-14 RX ADMIN — Medication 10 ML: at 13:30

## 2018-12-14 RX ADMIN — INSULIN LISPRO 4 UNITS: 100 INJECTION, SOLUTION INTRAVENOUS; SUBCUTANEOUS at 22:35

## 2018-12-14 RX ADMIN — FENTANYL CITRATE 50 MCG: 50 INJECTION INTRAMUSCULAR; INTRAVENOUS at 11:35

## 2018-12-14 RX ADMIN — BENZTROPINE MESYLATE 0.5 MG: 1 TABLET ORAL at 17:23

## 2018-12-14 RX ADMIN — FINASTERIDE 5 MG: 5 TABLET, FILM COATED ORAL at 09:22

## 2018-12-14 RX ADMIN — FENTANYL CITRATE 50 MCG: 50 INJECTION, SOLUTION INTRAMUSCULAR; INTRAVENOUS at 11:35

## 2018-12-14 RX ADMIN — HYDROCHLOROTHIAZIDE 25 MG: 25 TABLET ORAL at 09:20

## 2018-12-14 RX ADMIN — HALOPERIDOL 10 MG: 10 TABLET ORAL at 17:23

## 2018-12-14 NOTE — PROGRESS NOTES
TRANSFER - OUT REPORT:    Verbal report given to [employer], RN(name) on [employer]  being [facility] - op       Report consisted of patients Situation, Background, Assessment and   Recommendations(SBAR). Information from the following report(s) SBAR, Kardex and MAR was reviewed with the receiving nurse. Lines:   Peripheral IV 12/10/18 Right Antecubital (Active)   Site Assessment Clean, dry, & intact 12/14/2018  8:30 AM   Phlebitis Assessment 0 12/14/2018  8:30 AM   Infiltration Assessment 0 12/14/2018  8:30 AM   Dressing Status Clean, dry, & intact 12/14/2018  8:30 AM   Dressing Type Transparent 12/14/2018  8:30 AM   Hub Color/Line Status Pink;Capped 12/14/2018  8:30 AM   Action Taken Open ports on tubing capped 12/13/2018  8:00 AM   Alcohol Cap Used Yes 12/14/2018  8:30 AM        Opportunity for questions and clarification was provided.       Patient transported with:   [place]

## 2018-12-14 NOTE — H&P
Preprocedure Assessment      Today 12/14/2018     Indication/Symptoms:   Fady Casas is a 70 y.o. Male here for image guided LUQ abdominal mass CT guided core needle Bx. The H & P and/or progress notes and any available imaging were reviewed. The risks, indications and possible alternatives to the procedure, including doing nothing, were discussed and informed consent was obtained. Physical Exam:      Heart:   RRR   Lungs:   CTAB, no wheezes, rhonchi or rales. The patient is an appropriate candidate to undergo the planned procedure and sedation.     MD Ham López MD

## 2018-12-14 NOTE — ROUTINE PROCESS
Bedside shift change report given to Royce (oncoming nurse) by Luisana Shawneting RN (offgoing nurse). Report included the following information SBAR, Kardex, MAR, Accordion, Recent Results and Cardiac Rhythm NSR.

## 2018-12-14 NOTE — PROGRESS NOTES
Problem: Falls - Risk of  Goal: *Absence of Falls  Document Meggan Fall Risk and appropriate interventions in the flowsheet. Outcome: Progressing Towards Goal  Fall Risk Interventions:            Medication Interventions: Teach patient to arise slowly                  Problem: Pressure Injury - Risk of  Goal: *Prevention of pressure injury  Document James Scale and appropriate interventions in the flowsheet. Outcome: Progressing Towards Goal  Pressure Injury Interventions:  Sensory Interventions: Assess changes in LOC    Moisture Interventions: Absorbent underpads, Maintain skin hydration (lotion/cream)    Activity Interventions: Increase time out of bed         Nutrition Interventions: Document food/fluid/supplement intake                    Problem: Pressure Injury - Risk of  Goal: *Prevention of pressure injury  Document James Scale and appropriate interventions in the flowsheet.   Outcome: Progressing Towards Goal  Pressure Injury Interventions:  Sensory Interventions: Assess changes in LOC    Moisture Interventions: Absorbent underpads, Maintain skin hydration (lotion/cream)    Activity Interventions: Increase time out of bed         Nutrition Interventions: Document food/fluid/supplement intake

## 2018-12-14 NOTE — PROGRESS NOTES
Phone: [phone]     Hematology / Oncology Progress Note  [facility]      Patient: Ruben Gist   MRN: [id number]         CSN: [id number]    YOB: 1955      Admit Date: 12/10/2018    Assessment:     Active Problems:    Abdominal mass (12/10/2018)      Acute chest pain (12/10/2018)    renal mass, retroperitoneal mets, possibly neoplasm    Plan:     Biopsy of retroperitoneal mass today.   Complete bone scan and MRI brain  agreeable to proceed    Alissa Duarte  [facility] [phone]      Subjective:     Comfortable    Objective:     Visit Vitals  /86 (BP 1 Location: Right arm, BP Patient Position: At rest)   Pulse 83   Temp 97.5 °F (36.4 °C)   Resp 16   Ht 5' 9\" (1.753 m) Comment: from previous encounter 18   Wt 60.7 kg (133 lb 12.8 oz)   SpO2 100%   BMI 19.76 kg/m²             Temp (24hrs), Av.4 °F (36.3 °C), Min:96.2 °F (35.7 °C), Max:98 °F (36.7 °C)        Intake/Output Summary (Last 24 hours) at 2018 0910  Last data filed at 2018 2106  Gross per 24 hour   Intake 400 ml   Output    Net 400 ml     Review of Systems:   Constitutional: negative for fevers, chills, sweats and fatigue  Eyes: negative for visual disturbance, redness and icterus  Ears, Nose, Mouth, Throat, and Face: negative for tinnitus, epistaxis, sore mouth and hoarseness  Respiratory: negative for cough, sputum, hemoptysis, pleurisy/chest pain or wheezing  Cardiovascular: negative for chest pain, chest pressure/discomfort, palpitations, irregular heart beats, syncope, paroxysmal nocturnal dyspnea  Gastrointestinal: negative for reflux symptoms, nausea, vomiting, change in bowel habits, melena, diarrhea, constipation and abdominal pain  Genitourinary:negative for dysuria, nocturia, urinary incontinence, hesitancy and hematuria  Integument: negative for rash, skin lesion(s) and pruritus  Hematologic/Lymphatic: negative for easy bruising, bleeding and lymphadenopathy  Musculoskeletal:negative for myalgias, arthralgias and bone pain  Neurological: negative for headaches, dizziness, seizures, paresthesia and weakness    Physical Exam:  Constitutional: Alert, oriented, not in distress  Eyes: PERRLA, anicteric, no redness  Ears, nose, mouth, throat, and face: no palpable Lymph nodes, no mucositis, no thrush. Respiratory: symmetrical expansion, no rales, no rhonchi, no wheezing. Cardiovascular: S1S2, no pathologic murmur, no rub. Gastrointestinal: soft, benign, non tender, no HSM, normal bowel sounds, no mass. Integument: no rash, no petechiae, no ecchymosis. Musculoskeletal: no edema, no cyanosis, no clubbing. Neurological: intact, cranial nerves, no focal motor or sensory deficits. Labs:  Recent Results (from the past 24 hour(s))   GLUCOSE, POC    Collection Time: 12/13/18 12:09 PM   Result Value Ref Range    Glucose (POC) 267 (H) 70 - 110 mg/dL   GLUCOSE, POC    Collection Time: 12/13/18  4:57 PM   Result Value Ref Range    Glucose (POC) 164 (H) 70 - 110 mg/dL   GLUCOSE, POC    Collection Time: 12/13/18  9:21 PM   Result Value Ref Range    Glucose (POC) 195 (H) 70 - 110 mg/dL   CBC WITH AUTOMATED DIFF    Collection Time: 12/14/18  2:30 AM   Result Value Ref Range    WBC 5.5 4.6 - 13.2 K/uL    RBC 3.81 (L) 4.70 - 5.50 M/uL    HGB 11.7 (L) 13.0 - 16.0 g/dL    HCT 34.2 (L) 36.0 - 48.0 %    MCV 89.8 74.0 - 97.0 FL    MCH 30.7 24.0 - 34.0 PG    MCHC 34.2 31.0 - 37.0 g/dL    RDW 12.6 11.6 - 14.5 %    PLATELET 794 123 - 833 K/uL    MPV 10.4 9.2 - 11.8 FL    NEUTROPHILS 50 40 - 73 %    LYMPHOCYTES 38 21 - 52 %    MONOCYTES 10 3 - 10 %    EOSINOPHILS 2 0 - 5 %    BASOPHILS 0 0 - 2 %    ABS. NEUTROPHILS 2.8 1.8 - 8.0 K/UL    ABS. LYMPHOCYTES 2.1 0.9 - 3.6 K/UL    ABS. MONOCYTES 0.5 0.05 - 1.2 K/UL    ABS. EOSINOPHILS 0.1 0.0 - 0.4 K/UL    ABS.  BASOPHILS 0.0 0.0 - 0.1 K/UL    DF AUTOMATED     METABOLIC PANEL, BASIC    Collection Time: 12/14/18  2:30 AM   Result Value Ref Range    Sodium 140 136 - 145 mmol/L    Potassium 3.7 3.5 - 5.5 mmol/L    Chloride 105 100 - 108 mmol/L    CO2 26 21 - 32 mmol/L    Anion gap 9 3.0 - 18 mmol/L    Glucose 112 (H) 74 - 99 mg/dL    BUN 21 (H) 7.0 - 18 MG/DL    Creatinine 1.18 0.6 - 1.3 MG/DL    BUN/Creatinine ratio 18 12 - 20      GFR est AA >60 >60 ml/min/1.73m2    GFR est non-AA >60 >60 ml/min/1.73m2    Calcium 9.4 8.5 - 10.1 MG/DL   MAGNESIUM    Collection Time: 12/14/18  2:30 AM   Result Value Ref Range    Magnesium 2.1 1.6 - 2.6 mg/dL

## 2018-12-14 NOTE — ROUTINE PROCESS
Bedside and Verbal shift change report given to [address] [place] [address] (oncoming nurse) by Evie Estrada (offgoing nurse). Report included the following information SBAR, Kardex, Intake/Output and MAR.

## 2018-12-14 NOTE — PROGRESS NOTES
[facility]  Progress Note    Patient: Keila Martin Age: 70 y.o. : 1955 MR#: [id number] SSN: [id number]  Date: 2018     Subjective:   Alert and oriented X 3. NAD. No new complaints, no overnight events. No chest pain, no SOB, no headache, no other complaints. No n/v. No abd pain. Pt agreeable to biopsy scheduled for this morning. Assessment/Plan:     1. Chest pain-atypical, likely r/t #2-resolved. 2. large right kidney mass lower pole with retroperitoneal soft tissue masses concerning for renal neoplasm and metastasis  3. Left upper quadrant mass  4. DMT2 A1c 8.7  5. HTN   6. Schizophrenia  7. Hx elevated PSA, BPH w/LUTS - followed by Dr. Walker Reyes  1. No chest pain. Troponin neg X 3. CXR NSR, no ST/T abnormalities. RESOLVED. No further episodes this admission. 2. GI, Urology, Oncology following. CT guided bx of LUQ mass scheduled for today. Complete bone scan / brain MRI per oncology rec pending. Lovenox, ASA held 2nd pending biopsy, will resume when ok w/IR. 3. SSI, continued on home regimen glimepiride. 4. Continued on home regimen. HCTZ added for tighter BP management.       Additional Notes:      Case discussed with:  [x]Patient  [x]Family  [x]Nursing  []Case Management  DVT Prophylaxis:  [x]Lovenox  []Hep SQ  []SCDs  []Coumadin   []On Heparin gtt    Objective:   VS:   Visit Vitals  /86 (BP 1 Location: Right arm, BP Patient Position: At rest)   Pulse 83   Temp 97.5 °F (36.4 °C)   Resp 16   Ht 5' 9\" (1.753 m) Comment: from previous encounter 18   Wt 60.7 kg (133 lb 12.8 oz)   SpO2 100%   BMI 19.76 kg/m²      Tmax/24hrs: Temp (24hrs), Av.4 °F (36.3 °C), Min:96.2 °F (35.7 °C), Max:98 °F (36.7 °C)      Intake/Output Summary (Last 24 hours) at 2018 0930  Last data filed at 2018 2106  Gross per 24 hour   Intake 400 ml   Output    Net 400 ml       General:  Alert, NAD  Cardiovascular:  RRR  Pulmonary:  LSC throughout; respiratory effort WNL  GI:  +BS in all four quadrants, soft, non-tender  Extremities:  No edema; 2+ dorsalis pedis pulses bilaterally  Neuro: alert and oriented X 3. Labs:    Recent Results (from the past 24 hour(s))   GLUCOSE, POC    Collection Time: 12/13/18 12:09 PM   Result Value Ref Range    Glucose (POC) 267 (H) 70 - 110 mg/dL   GLUCOSE, POC    Collection Time: 12/13/18  4:57 PM   Result Value Ref Range    Glucose (POC) 164 (H) 70 - 110 mg/dL   GLUCOSE, POC    Collection Time: 12/13/18  9:21 PM   Result Value Ref Range    Glucose (POC) 195 (H) 70 - 110 mg/dL   CBC WITH AUTOMATED DIFF    Collection Time: 12/14/18  2:30 AM   Result Value Ref Range    WBC 5.5 4.6 - 13.2 K/uL    RBC 3.81 (L) 4.70 - 5.50 M/uL    HGB 11.7 (L) 13.0 - 16.0 g/dL    HCT 34.2 (L) 36.0 - 48.0 %    MCV 89.8 74.0 - 97.0 FL    MCH 30.7 24.0 - 34.0 PG    MCHC 34.2 31.0 - 37.0 g/dL    RDW 12.6 11.6 - 14.5 %    PLATELET 230 896 - 834 K/uL    MPV 10.4 9.2 - 11.8 FL    NEUTROPHILS 50 40 - 73 %    LYMPHOCYTES 38 21 - 52 %    MONOCYTES 10 3 - 10 %    EOSINOPHILS 2 0 - 5 %    BASOPHILS 0 0 - 2 %    ABS. NEUTROPHILS 2.8 1.8 - 8.0 K/UL    ABS. LYMPHOCYTES 2.1 0.9 - 3.6 K/UL    ABS. MONOCYTES 0.5 0.05 - 1.2 K/UL    ABS. EOSINOPHILS 0.1 0.0 - 0.4 K/UL    ABS.  BASOPHILS 0.0 0.0 - 0.1 K/UL    DF AUTOMATED     METABOLIC PANEL, BASIC    Collection Time: 12/14/18  2:30 AM   Result Value Ref Range    Sodium 140 136 - 145 mmol/L    Potassium 3.7 3.5 - 5.5 mmol/L    Chloride 105 100 - 108 mmol/L    CO2 26 21 - 32 mmol/L    Anion gap 9 3.0 - 18 mmol/L    Glucose 112 (H) 74 - 99 mg/dL    BUN 21 (H) 7.0 - 18 MG/DL    Creatinine 1.18 0.6 - 1.3 MG/DL    BUN/Creatinine ratio 18 12 - 20      GFR est AA >60 >60 ml/min/1.73m2    GFR est non-AA >60 >60 ml/min/1.73m2    Calcium 9.4 8.5 - 10.1 MG/DL   MAGNESIUM    Collection Time: 12/14/18  2:30 AM   Result Value Ref Range    Magnesium 2.1 1.6 - 2.6 mg/dL       Signed By: Carlos Zamarripa NP     December 14, 2018

## 2018-12-14 NOTE — ROUTINE PROCESS
Bedside and Verbal shift change report given to RN (oncoming nurse) by Leilani Ramos (offgoing nurse).  Report included the following information SBAR, Kardex, MAR, Recent Results and Cardiac Rhythm SR.

## 2018-12-14 NOTE — ROUTINE PROCESS
Bedside and Verbal shift change report given to Mahnaz IRENE (oncoming nurse) by Deo Barone (offgoing nurse). Report included the following information SBAR, Kardex, MAR, Recent Results and Cardiac Rhythm SR. Patient quietly resting on rounds, call light in reach. Patient reminded of NPO.

## 2018-12-15 LAB
ANION GAP SERPL CALC-SCNC: 9 MMOL/L (ref 3–18)
BASOPHILS # BLD: 0 K/UL (ref 0–0.1)
BASOPHILS NFR BLD: 0 % (ref 0–2)
BUN SERPL-MCNC: 20 MG/DL (ref 7–18)
BUN/CREAT SERPL: 17 (ref 12–20)
CALCIUM SERPL-MCNC: 9.2 MG/DL (ref 8.5–10.1)
CHLORIDE SERPL-SCNC: 105 MMOL/L (ref 100–108)
CO2 SERPL-SCNC: 26 MMOL/L (ref 21–32)
CREAT SERPL-MCNC: 1.17 MG/DL (ref 0.6–1.3)
DIFFERENTIAL METHOD BLD: ABNORMAL
EOSINOPHIL # BLD: 0.1 K/UL (ref 0–0.4)
EOSINOPHIL NFR BLD: 1 % (ref 0–5)
ERYTHROCYTE [DISTWIDTH] IN BLOOD BY AUTOMATED COUNT: 12.7 % (ref 11.6–14.5)
GLUCOSE BLD STRIP.AUTO-MCNC: 123 MG/DL (ref 70–110)
GLUCOSE BLD STRIP.AUTO-MCNC: 162 MG/DL (ref 70–110)
GLUCOSE BLD STRIP.AUTO-MCNC: 173 MG/DL (ref 70–110)
GLUCOSE BLD STRIP.AUTO-MCNC: 193 MG/DL (ref 70–110)
GLUCOSE SERPL-MCNC: 163 MG/DL (ref 74–99)
HCT VFR BLD AUTO: 33.3 % (ref 36–48)
HGB BLD-MCNC: 11.4 G/DL (ref 13–16)
LYMPHOCYTES # BLD: 1.7 K/UL (ref 0.9–3.6)
LYMPHOCYTES NFR BLD: 24 % (ref 21–52)
MCH RBC QN AUTO: 30.7 PG (ref 24–34)
MCHC RBC AUTO-ENTMCNC: 34.2 G/DL (ref 31–37)
MCV RBC AUTO: 89.8 FL (ref 74–97)
MONOCYTES # BLD: 0.8 K/UL (ref 0.05–1.2)
MONOCYTES NFR BLD: 11 % (ref 3–10)
NEUTS SEG # BLD: 4.6 K/UL (ref 1.8–8)
NEUTS SEG NFR BLD: 64 % (ref 40–73)
PLATELET # BLD AUTO: 236 K/UL (ref 135–420)
PMV BLD AUTO: 10.5 FL (ref 9.2–11.8)
POTASSIUM SERPL-SCNC: 3.5 MMOL/L (ref 3.5–5.5)
RBC # BLD AUTO: 3.71 M/UL (ref 4.7–5.5)
SODIUM SERPL-SCNC: 140 MMOL/L (ref 136–145)
WBC # BLD AUTO: 7.1 K/UL (ref 4.6–13.2)

## 2018-12-15 PROCEDURE — 65390000012 HC CONDITION CODE 44 OBSERVATION

## 2018-12-15 PROCEDURE — 74011636637 HC RX REV CODE- 636/637: Performed by: INTERNAL MEDICINE

## 2018-12-15 PROCEDURE — 74011250637 HC RX REV CODE- 250/637: Performed by: NURSE PRACTITIONER

## 2018-12-15 PROCEDURE — 82962 GLUCOSE BLOOD TEST: CPT

## 2018-12-15 PROCEDURE — 85025 COMPLETE CBC W/AUTO DIFF WBC: CPT

## 2018-12-15 PROCEDURE — 74011250637 HC RX REV CODE- 250/637: Performed by: INTERNAL MEDICINE

## 2018-12-15 PROCEDURE — 65660000000 HC RM CCU STEPDOWN

## 2018-12-15 PROCEDURE — 36415 COLL VENOUS BLD VENIPUNCTURE: CPT

## 2018-12-15 PROCEDURE — 80048 BASIC METABOLIC PNL TOTAL CA: CPT

## 2018-12-15 RX ADMIN — HALOPERIDOL 10 MG: 10 TABLET ORAL at 08:12

## 2018-12-15 RX ADMIN — ATORVASTATIN CALCIUM 10 MG: 10 TABLET, FILM COATED ORAL at 08:13

## 2018-12-15 RX ADMIN — HYDROCHLOROTHIAZIDE 25 MG: 25 TABLET ORAL at 08:12

## 2018-12-15 RX ADMIN — MELATONIN TAB 3 MG 6 MG: 3 TAB at 22:15

## 2018-12-15 RX ADMIN — BENZTROPINE MESYLATE 0.5 MG: 1 TABLET ORAL at 08:12

## 2018-12-15 RX ADMIN — BENZTROPINE MESYLATE 0.5 MG: 1 TABLET ORAL at 17:23

## 2018-12-15 RX ADMIN — Medication 10 ML: at 08:20

## 2018-12-15 RX ADMIN — Medication 10 ML: at 17:22

## 2018-12-15 RX ADMIN — FINASTERIDE 5 MG: 5 TABLET, FILM COATED ORAL at 08:31

## 2018-12-15 RX ADMIN — GLIMEPIRIDE 4 MG: 1 TABLET ORAL at 08:12

## 2018-12-15 RX ADMIN — INSULIN LISPRO 2 UNITS: 100 INJECTION, SOLUTION INTRAVENOUS; SUBCUTANEOUS at 08:13

## 2018-12-15 RX ADMIN — HALOPERIDOL 10 MG: 10 TABLET ORAL at 17:23

## 2018-12-15 RX ADMIN — DOCUSATE SODIUM 100 MG: 100 CAPSULE, LIQUID FILLED ORAL at 08:12

## 2018-12-15 NOTE — ROUTINE PROCESS
Bedside shift change report given to Daleen Dakin (oncoming nurse) by Sanjuana Barney RN (offgoing nurse). Report included the following information SBAR, Kardex, Recent Results and Cardiac Rhythm NSR.

## 2018-12-15 NOTE — PROGRESS NOTES
MRI Safety Screening form needs to be filled out and faxed to [phone] MRI can be scheduled. If unable to acquire information from pt, [facility] must be contacted.  If pt is claustrophobic or will need pain meds, please have ordered in advance to help facilitate MRI exam.

## 2018-12-15 NOTE — PROGRESS NOTES
[facility]  Progress Note    Patient: Gloria Simpson Age: 70 y.o. : 1955 MR#: [id number] SSN: [id number]  Date: 12/15/2018     Subjective:   Alert and oriented X 3. NAD. No new complaints, no overnight events. No chest pain, no SOB, no headache, no other complaints. No n/v. No abd pain. Assessment/Plan:     1. Chest pain-atypical, likely r/t #2-resolved. 2. large right kidney mass lower pole with retroperitoneal soft tissue masses concerning for renal neoplasm and metastasis  3. Left upper quadrant mass  4. DMT2 A1c 8.7  5. HTN   6. Schizophrenia  7. Hx elevated PSA, BPH w/LUTS - followed by Dr. Thomas   1. No chest pain. Troponin neg X 3. CXR NSR, no ST/T abnormalities. RESOLVED. No further episodes this admission. 2. GI, Urology, Oncology following. Underwent CT guided bx of LUQ mass 18. Complete bone scan w/ new Intense increased uptake focus in the right parietal region. Brain MRI pending. will resume when ok w/IRLovenox, ASA . 3. SSI, continued on home regimen glimepiride. 4. Slightly hypotensive, HCZT d/c'd. Continued on home regimen.      Additional Notes:      Case discussed with:  [x]Patient  [x]Family  [x]Nursing  []Case Management  DVT Prophylaxis:  [x]Lovenox  []Hep SQ  []SCDs  []Coumadin   []On Heparin gtt    Objective:   VS:   Visit Vitals  /71 (BP 1 Location: Right arm, BP Patient Position: At rest)   Pulse 84   Temp 98.9 °F (37.2 °C)   Resp 20   Ht 5' 9\" (1.753 m) Comment: from previous encounter 18   Wt 60.6 kg (133 lb 9.6 oz)   SpO2 100%   BMI 19.73 kg/m²      Tmax/24hrs: Temp (24hrs), Av.2 °F (36.8 °C), Min:97.3 °F (36.3 °C), Max:98.9 °F (37.2 °C)      Intake/Output Summary (Last 24 hours) at 12/15/2018 1735  Last data filed at 2018 2236  Gross per 24 hour   Intake 220 ml   Output    Net 220 ml       General:  Alert, NAD  Cardiovascular:  RRR  Pulmonary:  LSC throughout; respiratory effort WNL  GI: +BS in all four quadrants, soft, non-tender  Extremities:  No edema; 2+ dorsalis pedis pulses bilaterally  Neuro: alert and oriented X 3. Labs:    Recent Results (from the past 24 hour(s))   GLUCOSE, POC    Collection Time: 12/14/18  5:21 PM   Result Value Ref Range    Glucose (POC) 138 (H) 70 - 110 mg/dL   GLUCOSE, POC    Collection Time: 12/14/18  9:12 PM   Result Value Ref Range    Glucose (POC) 209 (H) 70 - 110 mg/dL   CBC WITH AUTOMATED DIFF    Collection Time: 12/15/18  4:08 AM   Result Value Ref Range    WBC 7.1 4.6 - 13.2 K/uL    RBC 3.71 (L) 4.70 - 5.50 M/uL    HGB 11.4 (L) 13.0 - 16.0 g/dL    HCT 33.3 (L) 36.0 - 48.0 %    MCV 89.8 74.0 - 97.0 FL    MCH 30.7 24.0 - 34.0 PG    MCHC 34.2 31.0 - 37.0 g/dL    RDW 12.7 11.6 - 14.5 %    PLATELET 965 178 - 491 K/uL    MPV 10.5 9.2 - 11.8 FL    NEUTROPHILS 64 40 - 73 %    LYMPHOCYTES 24 21 - 52 %    MONOCYTES 11 (H) 3 - 10 %    EOSINOPHILS 1 0 - 5 %    BASOPHILS 0 0 - 2 %    ABS. NEUTROPHILS 4.6 1.8 - 8.0 K/UL    ABS. LYMPHOCYTES 1.7 0.9 - 3.6 K/UL    ABS. MONOCYTES 0.8 0.05 - 1.2 K/UL    ABS. EOSINOPHILS 0.1 0.0 - 0.4 K/UL    ABS.  BASOPHILS 0.0 0.0 - 0.1 K/UL    DF AUTOMATED     METABOLIC PANEL, BASIC    Collection Time: 12/15/18  4:08 AM   Result Value Ref Range    Sodium 140 136 - 145 mmol/L    Potassium 3.5 3.5 - 5.5 mmol/L    Chloride 105 100 - 108 mmol/L    CO2 26 21 - 32 mmol/L    Anion gap 9 3.0 - 18 mmol/L    Glucose 163 (H) 74 - 99 mg/dL    BUN 20 (H) 7.0 - 18 MG/DL    Creatinine 1.17 0.6 - 1.3 MG/DL    BUN/Creatinine ratio 17 12 - 20      GFR est AA >60 >60 ml/min/1.73m2    GFR est non-AA >60 >60 ml/min/1.73m2    Calcium 9.2 8.5 - 10.1 MG/DL   GLUCOSE, POC    Collection Time: 12/15/18  7:44 AM   Result Value Ref Range    Glucose (POC) 162 (H) 70 - 110 mg/dL   GLUCOSE, POC    Collection Time: 12/15/18 11:34 AM   Result Value Ref Range    Glucose (POC) 123 (H) 70 - 110 mg/dL       Signed By: Krissy Earl NP     December 15, 2018

## 2018-12-15 NOTE — PROGRESS NOTES
Called for 3rd time to get  MRI Safety Screening sheet completed. Open table time 3 x's pt is a pending discharge.

## 2018-12-16 LAB
ANION GAP SERPL CALC-SCNC: 8 MMOL/L (ref 3–18)
BASOPHILS # BLD: 0 K/UL (ref 0–0.1)
BASOPHILS NFR BLD: 0 % (ref 0–2)
BUN SERPL-MCNC: 17 MG/DL (ref 7–18)
BUN/CREAT SERPL: 16 (ref 12–20)
CALCIUM SERPL-MCNC: 9.2 MG/DL (ref 8.5–10.1)
CHLORIDE SERPL-SCNC: 105 MMOL/L (ref 100–108)
CO2 SERPL-SCNC: 27 MMOL/L (ref 21–32)
CREAT SERPL-MCNC: 1.08 MG/DL (ref 0.6–1.3)
DIFFERENTIAL METHOD BLD: ABNORMAL
EOSINOPHIL # BLD: 0.1 K/UL (ref 0–0.4)
EOSINOPHIL NFR BLD: 2 % (ref 0–5)
ERYTHROCYTE [DISTWIDTH] IN BLOOD BY AUTOMATED COUNT: 12.6 % (ref 11.6–14.5)
GLUCOSE BLD STRIP.AUTO-MCNC: 148 MG/DL (ref 70–110)
GLUCOSE SERPL-MCNC: 147 MG/DL (ref 74–99)
HCT VFR BLD AUTO: 32 % (ref 36–48)
HGB BLD-MCNC: 10.8 G/DL (ref 13–16)
LYMPHOCYTES # BLD: 2 K/UL (ref 0.9–3.6)
LYMPHOCYTES NFR BLD: 38 % (ref 21–52)
MCH RBC QN AUTO: 30.3 PG (ref 24–34)
MCHC RBC AUTO-ENTMCNC: 33.8 G/DL (ref 31–37)
MCV RBC AUTO: 89.6 FL (ref 74–97)
MONOCYTES # BLD: 0.5 K/UL (ref 0.05–1.2)
MONOCYTES NFR BLD: 10 % (ref 3–10)
NEUTS SEG # BLD: 2.6 K/UL (ref 1.8–8)
NEUTS SEG NFR BLD: 50 % (ref 40–73)
PLATELET # BLD AUTO: 233 K/UL (ref 135–420)
PMV BLD AUTO: 10.2 FL (ref 9.2–11.8)
POTASSIUM SERPL-SCNC: 3.8 MMOL/L (ref 3.5–5.5)
RBC # BLD AUTO: 3.57 M/UL (ref 4.7–5.5)
SODIUM SERPL-SCNC: 140 MMOL/L (ref 136–145)
WBC # BLD AUTO: 5.2 K/UL (ref 4.6–13.2)

## 2018-12-16 PROCEDURE — 65660000000 HC RM CCU STEPDOWN

## 2018-12-16 PROCEDURE — 36415 COLL VENOUS BLD VENIPUNCTURE: CPT

## 2018-12-16 PROCEDURE — 85025 COMPLETE CBC W/AUTO DIFF WBC: CPT

## 2018-12-16 PROCEDURE — 74011250637 HC RX REV CODE- 250/637: Performed by: INTERNAL MEDICINE

## 2018-12-16 PROCEDURE — 65390000012 HC CONDITION CODE 44 OBSERVATION

## 2018-12-16 PROCEDURE — 82962 GLUCOSE BLOOD TEST: CPT

## 2018-12-16 PROCEDURE — 80048 BASIC METABOLIC PNL TOTAL CA: CPT

## 2018-12-16 RX ADMIN — BENZTROPINE MESYLATE 0.5 MG: 1 TABLET ORAL at 08:33

## 2018-12-16 RX ADMIN — Medication 10 ML: at 08:35

## 2018-12-16 RX ADMIN — GLIMEPIRIDE 4 MG: 1 TABLET ORAL at 08:33

## 2018-12-16 RX ADMIN — HALOPERIDOL 10 MG: 10 TABLET ORAL at 09:00

## 2018-12-16 RX ADMIN — BENZTROPINE MESYLATE 0.5 MG: 1 TABLET ORAL at 17:58

## 2018-12-16 RX ADMIN — Medication 10 ML: at 22:44

## 2018-12-16 RX ADMIN — ATORVASTATIN CALCIUM 10 MG: 10 TABLET, FILM COATED ORAL at 08:33

## 2018-12-16 RX ADMIN — FINASTERIDE 5 MG: 5 TABLET, FILM COATED ORAL at 09:11

## 2018-12-16 RX ADMIN — Medication 10 ML: at 17:58

## 2018-12-16 RX ADMIN — HALOPERIDOL 10 MG: 10 TABLET ORAL at 17:58

## 2018-12-16 RX ADMIN — DILTIAZEM HYDROCHLORIDE 120 MG: 120 CAPSULE, COATED, EXTENDED RELEASE ORAL at 08:33

## 2018-12-16 RX ADMIN — DOCUSATE SODIUM 100 MG: 100 CAPSULE, LIQUID FILLED ORAL at 08:33

## 2018-12-16 RX ADMIN — MELATONIN TAB 3 MG 6 MG: 3 TAB at 22:44

## 2018-12-16 NOTE — ROUTINE PROCESS
Bedside and Verbal shift change report given to Vanessa Chinchilla RN (oncoming nurse) by Jiles Leyden, RN   (offgoing nurse). Report included the following information SBAR, Kardex, Intake/Output and MAR.

## 2018-12-16 NOTE — PROGRESS NOTES
[facility]  Progress Note    Patient: Roderick Soriano Age: 70 y.o. : 1955 MR#: [id number] SSN: [id number]  Date: 2018     Subjective:     Feels well. No F/C, N/V, CP, SOB. Was hesitant about MRI, thinking it would cause pain. Explained MRI procedure and purpose, and pt is amenable to proceeding. Assessment/Plan:     1. Chest pain-atypical, likely r/t #2-resolved. 2. large right kidney mass lower pole with retroperitoneal soft tissue masses concerning for renal neoplasm and metastasis  3. Left upper quadrant mass  4. DMT2 A1c 8.7  5. HTN   6. Schizophrenia  7. Hx elevated PSA, BPH w/LUTS - followed by Dr. Alexandra Mills  1. No chest pain. Troponin neg X 3. CXR NSR, no ST/T abnormalities. RESOLVED. No new issues. 2. GI, Urology, Oncology following. Underwent CT guided bx of LUQ mass 18. Complete bone scan w/ new Intense increased uptake focus in the right parietal region. Brain MRI pending, pt amenable to proceed. Will resume when ok w/IR. Lovenox, ASA . 3. SSI, continued on home regimen glimepiride. 4. Slightly hypotensive, HCZT d/c'd. Continued on home regimen. BPs wnl. Additional Notes:      Case discussed with:  [x]Patient  []Family  [x]Nursing  []Case Management  DVT Prophylaxis:  [x]Lovenox  []Hep SQ  []SCDs  []Coumadin   []On Heparin gtt    Objective:   VS:   Visit Vitals  /81 (BP 1 Location: Right arm, BP Patient Position: At rest)   Pulse 90   Temp 97.7 °F (36.5 °C)   Resp 18   Ht 5' 9\" (1.753 m) Comment: from previous encounter 18   Wt 60.7 kg (133 lb 14.4 oz)   SpO2 97%   BMI 19.77 kg/m²      Tmax/24hrs: Temp (24hrs), Av °F (36.7 °C), Min:97.4 °F (36.3 °C), Max:98.9 °F (37.2 °C)    No intake or output data in the 24 hours ending 18 1414    General:  Awake, alert, NAD. Cardiovascular:  RRR. Pulmonary:  CTA B.  GI:  Soft, NT/ND, NABS. Extremities:  No CT or edema.    Neuro:     Labs:    Recent Results (from the past 24 hour(s))   GLUCOSE, POC    Collection Time: 12/15/18  5:22 PM   Result Value Ref Range    Glucose (POC) 193 (H) 70 - 110 mg/dL   GLUCOSE, POC    Collection Time: 12/15/18 10:14 PM   Result Value Ref Range    Glucose (POC) 173 (H) 70 - 110 mg/dL   CBC WITH AUTOMATED DIFF    Collection Time: 12/16/18  1:07 AM   Result Value Ref Range    WBC 5.2 4.6 - 13.2 K/uL    RBC 3.57 (L) 4.70 - 5.50 M/uL    HGB 10.8 (L) 13.0 - 16.0 g/dL    HCT 32.0 (L) 36.0 - 48.0 %    MCV 89.6 74.0 - 97.0 FL    MCH 30.3 24.0 - 34.0 PG    MCHC 33.8 31.0 - 37.0 g/dL    RDW 12.6 11.6 - 14.5 %    PLATELET 689 796 - 843 K/uL    MPV 10.2 9.2 - 11.8 FL    NEUTROPHILS 50 40 - 73 %    LYMPHOCYTES 38 21 - 52 %    MONOCYTES 10 3 - 10 %    EOSINOPHILS 2 0 - 5 %    BASOPHILS 0 0 - 2 %    ABS. NEUTROPHILS 2.6 1.8 - 8.0 K/UL    ABS. LYMPHOCYTES 2.0 0.9 - 3.6 K/UL    ABS. MONOCYTES 0.5 0.05 - 1.2 K/UL    ABS. EOSINOPHILS 0.1 0.0 - 0.4 K/UL    ABS.  BASOPHILS 0.0 0.0 - 0.1 K/UL    DF AUTOMATED     METABOLIC PANEL, BASIC    Collection Time: 12/16/18  1:07 AM   Result Value Ref Range    Sodium 140 136 - 145 mmol/L    Potassium 3.8 3.5 - 5.5 mmol/L    Chloride 105 100 - 108 mmol/L    CO2 27 21 - 32 mmol/L    Anion gap 8 3.0 - 18 mmol/L    Glucose 147 (H) 74 - 99 mg/dL    BUN 17 7.0 - 18 MG/DL    Creatinine 1.08 0.6 - 1.3 MG/DL    BUN/Creatinine ratio 16 12 - 20      GFR est AA >60 >60 ml/min/1.73m2    GFR est non-AA >60 >60 ml/min/1.73m2    Calcium 9.2 8.5 - 10.1 MG/DL       Signed By: Jazz Lovett MD     December 16, 2018

## 2018-12-16 NOTE — ROUTINE PROCESS
Bedside shift change report given to Kacey Jasmine RN (oncoming nurse) by Robert Mahajan RN (offgoing nurse). Report included the following information SBAR, Kardex, Intake/Output, Recent Results and Cardiac Rhythm NSR.

## 2018-12-17 ENCOUNTER — APPOINTMENT (OUTPATIENT)
Dept: MRI IMAGING | Age: 63
End: 2018-12-17
Attending: INTERNAL MEDICINE
Payer: MEDICARE

## 2018-12-17 LAB
ANION GAP SERPL CALC-SCNC: 8 MMOL/L (ref 3–18)
BASOPHILS # BLD: 0 K/UL (ref 0–0.1)
BASOPHILS NFR BLD: 0 % (ref 0–2)
BUN SERPL-MCNC: 20 MG/DL (ref 7–18)
BUN/CREAT SERPL: 15 (ref 12–20)
CALCIUM SERPL-MCNC: 9.2 MG/DL (ref 8.5–10.1)
CHLORIDE SERPL-SCNC: 102 MMOL/L (ref 100–108)
CO2 SERPL-SCNC: 28 MMOL/L (ref 21–32)
CREAT SERPL-MCNC: 1.32 MG/DL (ref 0.6–1.3)
DIFFERENTIAL METHOD BLD: ABNORMAL
EOSINOPHIL # BLD: 0.1 K/UL (ref 0–0.4)
EOSINOPHIL NFR BLD: 2 % (ref 0–5)
ERYTHROCYTE [DISTWIDTH] IN BLOOD BY AUTOMATED COUNT: 13 % (ref 11.6–14.5)
GLUCOSE BLD STRIP.AUTO-MCNC: 141 MG/DL (ref 70–110)
GLUCOSE BLD STRIP.AUTO-MCNC: 153 MG/DL (ref 70–110)
GLUCOSE BLD STRIP.AUTO-MCNC: 160 MG/DL (ref 70–110)
GLUCOSE BLD STRIP.AUTO-MCNC: 203 MG/DL (ref 70–110)
GLUCOSE SERPL-MCNC: 173 MG/DL (ref 74–99)
HCT VFR BLD AUTO: 35.3 % (ref 36–48)
HGB BLD-MCNC: 12 G/DL (ref 13–16)
LYMPHOCYTES # BLD: 3.3 K/UL (ref 0.9–3.6)
LYMPHOCYTES NFR BLD: 46 % (ref 21–52)
MCH RBC QN AUTO: 31 PG (ref 24–34)
MCHC RBC AUTO-ENTMCNC: 34 G/DL (ref 31–37)
MCV RBC AUTO: 91.2 FL (ref 74–97)
MONOCYTES # BLD: 0.6 K/UL (ref 0.05–1.2)
MONOCYTES NFR BLD: 9 % (ref 3–10)
NEUTS SEG # BLD: 3.1 K/UL (ref 1.8–8)
NEUTS SEG NFR BLD: 43 % (ref 40–73)
PLATELET # BLD AUTO: 268 K/UL (ref 135–420)
PMV BLD AUTO: 10.5 FL (ref 9.2–11.8)
POTASSIUM SERPL-SCNC: 3.6 MMOL/L (ref 3.5–5.5)
RBC # BLD AUTO: 3.87 M/UL (ref 4.7–5.5)
SODIUM SERPL-SCNC: 138 MMOL/L (ref 136–145)
WBC # BLD AUTO: 7.2 K/UL (ref 4.6–13.2)

## 2018-12-17 PROCEDURE — 74011636320 HC RX REV CODE- 636/320: Performed by: FAMILY MEDICINE

## 2018-12-17 PROCEDURE — 85025 COMPLETE CBC W/AUTO DIFF WBC: CPT

## 2018-12-17 PROCEDURE — 74011250637 HC RX REV CODE- 250/637: Performed by: INTERNAL MEDICINE

## 2018-12-17 PROCEDURE — 70553 MRI BRAIN STEM W/O & W/DYE: CPT

## 2018-12-17 PROCEDURE — 74011636637 HC RX REV CODE- 636/637: Performed by: INTERNAL MEDICINE

## 2018-12-17 PROCEDURE — 99218 HC RM OBSERVATION: CPT

## 2018-12-17 PROCEDURE — 65390000012 HC CONDITION CODE 44 OBSERVATION

## 2018-12-17 PROCEDURE — 80048 BASIC METABOLIC PNL TOTAL CA: CPT

## 2018-12-17 PROCEDURE — 36415 COLL VENOUS BLD VENIPUNCTURE: CPT

## 2018-12-17 PROCEDURE — A9575 INJ GADOTERATE MEGLUMI 0.1ML: HCPCS | Performed by: FAMILY MEDICINE

## 2018-12-17 PROCEDURE — 82962 GLUCOSE BLOOD TEST: CPT

## 2018-12-17 RX ADMIN — Medication 10 ML: at 08:48

## 2018-12-17 RX ADMIN — INSULIN LISPRO 2 UNITS: 100 INJECTION, SOLUTION INTRAVENOUS; SUBCUTANEOUS at 08:43

## 2018-12-17 RX ADMIN — LISINOPRIL 20 MG: 20 TABLET ORAL at 08:44

## 2018-12-17 RX ADMIN — DILTIAZEM HYDROCHLORIDE 120 MG: 120 CAPSULE, COATED, EXTENDED RELEASE ORAL at 08:43

## 2018-12-17 RX ADMIN — HALOPERIDOL 10 MG: 10 TABLET ORAL at 08:43

## 2018-12-17 RX ADMIN — INSULIN LISPRO 4 UNITS: 100 INJECTION, SOLUTION INTRAVENOUS; SUBCUTANEOUS at 12:00

## 2018-12-17 RX ADMIN — GLIMEPIRIDE 4 MG: 1 TABLET ORAL at 08:43

## 2018-12-17 RX ADMIN — Medication 10 ML: at 17:38

## 2018-12-17 RX ADMIN — GADOTERATE MEGLUMINE 12 ML: 376.9 INJECTION INTRAVENOUS at 09:50

## 2018-12-17 RX ADMIN — ATORVASTATIN CALCIUM 10 MG: 10 TABLET, FILM COATED ORAL at 08:44

## 2018-12-17 RX ADMIN — MELATONIN TAB 3 MG 6 MG: 3 TAB at 23:58

## 2018-12-17 RX ADMIN — BENZTROPINE MESYLATE 0.5 MG: 1 TABLET ORAL at 17:33

## 2018-12-17 RX ADMIN — FINASTERIDE 5 MG: 5 TABLET, FILM COATED ORAL at 10:32

## 2018-12-17 RX ADMIN — HALOPERIDOL 10 MG: 10 TABLET ORAL at 17:32

## 2018-12-17 RX ADMIN — BENZTROPINE MESYLATE 0.5 MG: 1 TABLET ORAL at 08:43

## 2018-12-17 RX ADMIN — INSULIN LISPRO 2 UNITS: 100 INJECTION, SOLUTION INTRAVENOUS; SUBCUTANEOUS at 17:33

## 2018-12-17 RX ADMIN — DOCUSATE SODIUM 100 MG: 100 CAPSULE, LIQUID FILLED ORAL at 08:43

## 2018-12-17 NOTE — PROGRESS NOTES
Phone: [phone]     Hematology / Oncology Progress Note  [facility]      Patient: Mary Vasquez   MRN: [id number]         CSN: [id number]    YOB: 1955      Admit Date: 12/10/2018    Assessment:     Active Problems:    Abdominal mass (12/10/2018)      Acute chest pain (12/10/2018)      Renal mass, soft tissue masses , possible RCC, mets  Plan:     Retroperitoneal mass biopsy pending  Bone scan completed, MRI brain pending  Requested his wife to be available tomorrow 8 am to discuss path and   further recommendations    Hollie Branham MD  [facility] [phone]      Subjective:     No new complaints    Objective:     Visit Vitals  /74 (BP 1 Location: Right arm, BP Patient Position: At rest)   Pulse 88   Temp 98 °F (36.7 °C)   Resp 18   Ht 5' 9\" (1.753 m) Comment: from previous encounter 18   Wt 61.1 kg (134 lb 9.6 oz)   SpO2 100%   BMI 19.88 kg/m²             Temp (24hrs), Av.7 °F (36.5 °C), Min:97.3 °F (36.3 °C), Max:98 °F (36.7 °C)      No intake or output data in the 24 hours ending 18 8324  Review of Systems:   Constitutional: negative for fevers, chills, sweats and fatigue  Eyes: negative for visual disturbance, redness and icterus  Ears, Nose, Mouth, Throat, and Face: negative for tinnitus, epistaxis, sore mouth and hoarseness  Respiratory: negative for cough, sputum, hemoptysis, pleurisy/chest pain or wheezing  Cardiovascular: negative for chest pain, chest pressure/discomfort, palpitations, irregular heart beats, syncope, paroxysmal nocturnal dyspnea  Gastrointestinal: negative for reflux symptoms, nausea, vomiting, change in bowel habits, melena, diarrhea, constipation and abdominal pain  Genitourinary:negative for dysuria, nocturia, urinary incontinence, hesitancy and hematuria  Integument: negative for rash, skin lesion(s) and pruritus  Hematologic/Lymphatic: negative for easy bruising, bleeding and lymphadenopathy  Musculoskeletal:negative for myalgias, arthralgias and bone pain  Neurological: negative for headaches, dizziness, seizures, paresthesia and weakness    Physical Exam:  Constitutional: Alert, oriented, not in distress  Eyes: PERRLA, anicteric, no redness  Ears, nose, mouth, throat, and face: no palpable Lymph nodes, no mucositis, no thrush. Respiratory: symmetrical expansion, no rales, no rhonchi, no wheezing. Cardiovascular: S1S2, no pathologic murmur, no rub. Gastrointestinal: soft, benign, non tender, no HSM, normal bowel sounds, no mass. Integument: no rash, no petechiae, no ecchymosis. Musculoskeletal: no edema, no cyanosis, no clubbing. Neurological: intact, cranial nerves, no focal motor or sensory deficits. Labs:  Recent Results (from the past 24 hour(s))   GLUCOSE, POC    Collection Time: 12/16/18 10:41 PM   Result Value Ref Range    Glucose (POC) 148 (H) 70 - 110 mg/dL   CBC WITH AUTOMATED DIFF    Collection Time: 12/17/18  1:16 AM   Result Value Ref Range    WBC 7.2 4.6 - 13.2 K/uL    RBC 3.87 (L) 4.70 - 5.50 M/uL    HGB 12.0 (L) 13.0 - 16.0 g/dL    HCT 35.3 (L) 36.0 - 48.0 %    MCV 91.2 74.0 - 97.0 FL    MCH 31.0 24.0 - 34.0 PG    MCHC 34.0 31.0 - 37.0 g/dL    RDW 13.0 11.6 - 14.5 %    PLATELET 039 550 - 640 K/uL    MPV 10.5 9.2 - 11.8 FL    NEUTROPHILS 43 40 - 73 %    LYMPHOCYTES 46 21 - 52 %    MONOCYTES 9 3 - 10 %    EOSINOPHILS 2 0 - 5 %    BASOPHILS 0 0 - 2 %    ABS. NEUTROPHILS 3.1 1.8 - 8.0 K/UL    ABS. LYMPHOCYTES 3.3 0.9 - 3.6 K/UL    ABS. MONOCYTES 0.6 0.05 - 1.2 K/UL    ABS. EOSINOPHILS 0.1 0.0 - 0.4 K/UL    ABS.  BASOPHILS 0.0 0.0 - 0.1 K/UL    DF AUTOMATED     METABOLIC PANEL, BASIC    Collection Time: 12/17/18  1:16 AM   Result Value Ref Range    Sodium 138 136 - 145 mmol/L    Potassium 3.6 3.5 - 5.5 mmol/L    Chloride 102 100 - 108 mmol/L    CO2 28 21 - 32 mmol/L    Anion gap 8 3.0 - 18 mmol/L    Glucose 173 (H) 74 - 99 mg/dL    BUN 20 (H) 7.0 - 18 MG/DL Creatinine 1.32 (H) 0.6 - 1.3 MG/DL    BUN/Creatinine ratio 15 12 - 20      GFR est AA >60 >60 ml/min/1.73m2    GFR est non-AA 54 (L) >60 ml/min/1.73m2    Calcium 9.2 8.5 - 10.1 MG/DL   GLUCOSE, POC    Collection Time: 12/17/18  7:00 AM   Result Value Ref Range    Glucose (POC) 153 (H) 70 - 110 mg/dL

## 2018-12-17 NOTE — ROUTINE PROCESS
Bedside shift change report given to Adrian Duarte (oncoming nurse) by Endy Elam RN (offgoing nurse). Report included the following information SBAR, Kardex, Intake/Output, Recent Results and Cardiac Rhythm NSR.

## 2018-12-17 NOTE — PROGRESS NOTES
[facility]ist [facility]  Progress Note    Patient: Corby Burgos Age: 70 y.o. : 1955 MR#: [id number] SSN: [id number]  Date: 2018     Subjective:   Patient states no complaints today. Denies abdominal pain, N/V, CP, SOB, fever or chills. Laying in bed    Assessment/Plan:   Assessment  1. Chest pain, atypical. Resolved  2. Large right kidney mall, lower pole with retroperitoneal soft tissue masses concerning for renal neoplasm and metastasis  3. LUQ mass  4. DM II  5. HTN  6. Schizophrenia  7. Hx PSA elevated, BPH with LUTS. Followed by urology    Plan  1. No chest pain. Troponin negative  2. Oncology consult. bx pending. MRI no metastasis  3. POC glucose, SSI  4. Continue cardiac medication  5. Urology consult  6.  Monitor mood  Additional Notes:      Case discussed with:  [x]Patient  []Family  []Nursing  []Case Management  DVT Prophylaxis:  []Lovenox  []Hep SQ  [x]SCDs  []Coumadin   []On Heparin gtt    Objective:   VS:   Visit Vitals  /73 (BP 1 Location: Right arm, BP Patient Position: At rest)   Pulse 84   Temp 97.3 °F (36.3 °C)   Resp 18   Ht 5' 9\" (1.753 m) Comment: from previous encounter 18   Wt 61.1 kg (134 lb 9.6 oz)   SpO2 100%   BMI 19.88 kg/m²      Tmax/24hrs: Temp (24hrs), Av.6 °F (36.4 °C), Min:97.3 °F (36.3 °C), Max:98 °F (36.7 °C)      Intake/Output Summary (Last 24 hours) at 2018 1244  Last data filed at 2018 1022  Gross per 24 hour   Intake 240 ml   Output    Net 240 ml       General:  Alert, NAD  Cardiovascular:  RRR  Pulmonary:  LSC throughout; respiratory effort WNL  GI:  +BS in all four quadrants, soft, non-tender  Extremities:  No edema; 2+ dorsalis pedis pulses bilaterally  Neuro: flat affect, alert and oriented    Labs:    Recent Results (from the past 24 hour(s))   GLUCOSE, POC    Collection Time: 18 10:41 PM   Result Value Ref Range    Glucose (POC) 148 (H) 70 - 110 mg/dL   CBC WITH AUTOMATED DIFF    Collection Time: 12/17/18  1:16 AM   Result Value Ref Range    WBC 7.2 4.6 - 13.2 K/uL    RBC 3.87 (L) 4.70 - 5.50 M/uL    HGB 12.0 (L) 13.0 - 16.0 g/dL    HCT 35.3 (L) 36.0 - 48.0 %    MCV 91.2 74.0 - 97.0 FL    MCH 31.0 24.0 - 34.0 PG    MCHC 34.0 31.0 - 37.0 g/dL    RDW 13.0 11.6 - 14.5 %    PLATELET 985 621 - 820 K/uL    MPV 10.5 9.2 - 11.8 FL    NEUTROPHILS 43 40 - 73 %    LYMPHOCYTES 46 21 - 52 %    MONOCYTES 9 3 - 10 %    EOSINOPHILS 2 0 - 5 %    BASOPHILS 0 0 - 2 %    ABS. NEUTROPHILS 3.1 1.8 - 8.0 K/UL    ABS. LYMPHOCYTES 3.3 0.9 - 3.6 K/UL    ABS. MONOCYTES 0.6 0.05 - 1.2 K/UL    ABS. EOSINOPHILS 0.1 0.0 - 0.4 K/UL    ABS.  BASOPHILS 0.0 0.0 - 0.1 K/UL    DF AUTOMATED     METABOLIC PANEL, BASIC    Collection Time: 12/17/18  1:16 AM   Result Value Ref Range    Sodium 138 136 - 145 mmol/L    Potassium 3.6 3.5 - 5.5 mmol/L    Chloride 102 100 - 108 mmol/L    CO2 28 21 - 32 mmol/L    Anion gap 8 3.0 - 18 mmol/L    Glucose 173 (H) 74 - 99 mg/dL    BUN 20 (H) 7.0 - 18 MG/DL    Creatinine 1.32 (H) 0.6 - 1.3 MG/DL    BUN/Creatinine ratio 15 12 - 20      GFR est AA >60 >60 ml/min/1.73m2    GFR est non-AA 54 (L) >60 ml/min/1.73m2    Calcium 9.2 8.5 - 10.1 MG/DL   GLUCOSE, POC    Collection Time: 12/17/18  7:00 AM   Result Value Ref Range    Glucose (POC) 153 (H) 70 - 110 mg/dL   GLUCOSE, POC    Collection Time: 12/17/18 10:34 AM   Result Value Ref Range    Glucose (POC) 203 (H) 70 - 110 mg/dL       Signed By: Ke Jesus NP     December 17, 2018

## 2018-12-17 NOTE — PROGRESS NOTES
[address] letter and [address] letter given and signed. Spoke with pt about d/c concerns, pt has no d/c concerns, will likely d/c home, with wife.      Savannah Laws, MSW  Case Management  [phone]

## 2018-12-17 NOTE — ROUTINE PROCESS
Report received from [facility]. Patient is alert, no distress noted. Call bell in reach. 0730  Bedside, Verbal and Written shift change report given to [address] (oncoming nurse) by Beth Curry RN (offgoing nurse). Report included the following information SBAR, Kardex, MAR and [employer].

## 2018-12-18 VITALS
HEART RATE: 84 BPM | SYSTOLIC BLOOD PRESSURE: 112 MMHG | TEMPERATURE: 96.8 F | DIASTOLIC BLOOD PRESSURE: 74 MMHG | WEIGHT: 134.6 LBS | BODY MASS INDEX: 19.93 KG/M2 | HEIGHT: 69 IN | OXYGEN SATURATION: 100 % | RESPIRATION RATE: 20 BRPM

## 2018-12-18 LAB
ANION GAP SERPL CALC-SCNC: 8 MMOL/L (ref 3–18)
BASOPHILS # BLD: 0 K/UL (ref 0–0.1)
BASOPHILS NFR BLD: 0 % (ref 0–2)
BUN SERPL-MCNC: 18 MG/DL (ref 7–18)
BUN/CREAT SERPL: 16 (ref 12–20)
CALCIUM SERPL-MCNC: 9.2 MG/DL (ref 8.5–10.1)
CHLORIDE SERPL-SCNC: 105 MMOL/L (ref 100–108)
CO2 SERPL-SCNC: 27 MMOL/L (ref 21–32)
CREAT SERPL-MCNC: 1.13 MG/DL (ref 0.6–1.3)
DIFFERENTIAL METHOD BLD: ABNORMAL
EOSINOPHIL # BLD: 0.1 K/UL (ref 0–0.4)
EOSINOPHIL NFR BLD: 2 % (ref 0–5)
ERYTHROCYTE [DISTWIDTH] IN BLOOD BY AUTOMATED COUNT: 12.6 % (ref 11.6–14.5)
GLUCOSE BLD STRIP.AUTO-MCNC: 182 MG/DL (ref 70–110)
GLUCOSE BLD STRIP.AUTO-MCNC: 186 MG/DL (ref 70–110)
GLUCOSE BLD STRIP.AUTO-MCNC: 191 MG/DL (ref 70–110)
GLUCOSE SERPL-MCNC: 195 MG/DL (ref 74–99)
HCT VFR BLD AUTO: 31 % (ref 36–48)
HGB BLD-MCNC: 10.5 G/DL (ref 13–16)
LYMPHOCYTES # BLD: 2.2 K/UL (ref 0.9–3.6)
LYMPHOCYTES NFR BLD: 41 % (ref 21–52)
MCH RBC QN AUTO: 30.4 PG (ref 24–34)
MCHC RBC AUTO-ENTMCNC: 33.9 G/DL (ref 31–37)
MCV RBC AUTO: 89.9 FL (ref 74–97)
MONOCYTES # BLD: 0.5 K/UL (ref 0.05–1.2)
MONOCYTES NFR BLD: 9 % (ref 3–10)
NEUTS SEG # BLD: 2.5 K/UL (ref 1.8–8)
NEUTS SEG NFR BLD: 48 % (ref 40–73)
PLATELET # BLD AUTO: 246 K/UL (ref 135–420)
PMV BLD AUTO: 10.1 FL (ref 9.2–11.8)
POTASSIUM SERPL-SCNC: 4 MMOL/L (ref 3.5–5.5)
RBC # BLD AUTO: 3.45 M/UL (ref 4.7–5.5)
SODIUM SERPL-SCNC: 140 MMOL/L (ref 136–145)
WBC # BLD AUTO: 5.4 K/UL (ref 4.6–13.2)

## 2018-12-18 PROCEDURE — 97165 OT EVAL LOW COMPLEX 30 MIN: CPT

## 2018-12-18 PROCEDURE — 74011636637 HC RX REV CODE- 636/637: Performed by: INTERNAL MEDICINE

## 2018-12-18 PROCEDURE — G8989 SELF CARE D/C STATUS: HCPCS

## 2018-12-18 PROCEDURE — 99218 HC RM OBSERVATION: CPT

## 2018-12-18 PROCEDURE — G8979 MOBILITY GOAL STATUS: HCPCS

## 2018-12-18 PROCEDURE — 97161 PT EVAL LOW COMPLEX 20 MIN: CPT

## 2018-12-18 PROCEDURE — 80048 BASIC METABOLIC PNL TOTAL CA: CPT

## 2018-12-18 PROCEDURE — G8978 MOBILITY CURRENT STATUS: HCPCS

## 2018-12-18 PROCEDURE — G8988 SELF CARE GOAL STATUS: HCPCS

## 2018-12-18 PROCEDURE — 74011250637 HC RX REV CODE- 250/637: Performed by: INTERNAL MEDICINE

## 2018-12-18 PROCEDURE — 85025 COMPLETE CBC W/AUTO DIFF WBC: CPT

## 2018-12-18 PROCEDURE — 36415 COLL VENOUS BLD VENIPUNCTURE: CPT

## 2018-12-18 PROCEDURE — G8987 SELF CARE CURRENT STATUS: HCPCS

## 2018-12-18 PROCEDURE — 82962 GLUCOSE BLOOD TEST: CPT

## 2018-12-18 PROCEDURE — G8980 MOBILITY D/C STATUS: HCPCS

## 2018-12-18 RX ADMIN — Medication 10 ML: at 13:21

## 2018-12-18 RX ADMIN — FINASTERIDE 5 MG: 5 TABLET, FILM COATED ORAL at 09:01

## 2018-12-18 RX ADMIN — DOCUSATE SODIUM 100 MG: 100 CAPSULE, LIQUID FILLED ORAL at 08:48

## 2018-12-18 RX ADMIN — GLIMEPIRIDE 4 MG: 1 TABLET ORAL at 08:49

## 2018-12-18 RX ADMIN — INSULIN LISPRO 2 UNITS: 100 INJECTION, SOLUTION INTRAVENOUS; SUBCUTANEOUS at 13:18

## 2018-12-18 RX ADMIN — BENZTROPINE MESYLATE 0.5 MG: 1 TABLET ORAL at 08:50

## 2018-12-18 RX ADMIN — LISINOPRIL 20 MG: 20 TABLET ORAL at 08:49

## 2018-12-18 RX ADMIN — ATORVASTATIN CALCIUM 10 MG: 10 TABLET, FILM COATED ORAL at 08:50

## 2018-12-18 RX ADMIN — Medication 10 ML: at 00:01

## 2018-12-18 RX ADMIN — DILTIAZEM HYDROCHLORIDE 120 MG: 120 CAPSULE, COATED, EXTENDED RELEASE ORAL at 08:49

## 2018-12-18 RX ADMIN — HALOPERIDOL 10 MG: 10 TABLET ORAL at 08:49

## 2018-12-18 NOTE — DISCHARGE SUMMARY
[facility]  Discharge Summary       Patient: Chong Blackmon Age: 70 y.o. : 1955 MR#: [id number] SSN: [id number]  PCP on record: None  Admit date: 12/10/2018  Discharge date: 2018    Disposition:    [x]Home   []Home with Home Health   []SNF/NH   []Rehab   []Home with family   []Alternate Facility:____________________    Discharge Diagnoses:                             Chest pain, atypical POA  Large right kidney mass, lower pole with retroperitoneal soft tissue masses concerning for renal neoplasm and metastasis  LUQ mass  DM II  HTN  Schizophrenia  Hx elevated PSAm BPH with LUTS        Discharge Medications:     Current Discharge Medication List      CONTINUE these medications which have NOT CHANGED    Details   benztropine (COGENTIN) 0.5 mg tablet Take 1 Tab by mouth two (2) times a day. Qty: 60 Tab, Refills: 0      haloperidol (HALDOL) 10 mg tablet Take 1 Tab by mouth two (2) times a day. Qty: 60 Tab, Refills: 0      melatonin 3 mg tablet Take 2 Tabs by mouth nightly. Qty: 30 Tab, Refills: 0      aspirin delayed-release 81 mg tablet 81 mg.      docusate sodium (COLACE) 100 mg capsule 100 mg.      glimepiride (AMARYL) 4 mg tablet 4 mg.      lisinopril (PRINIVIL, ZESTRIL) 20 mg tablet 20 mg.      diltiazem CD (CARDIZEM CD) 120 mg ER capsule Take  by mouth daily. atorvastatin (LIPITOR) 10 mg tablet Take  by mouth daily. finasteride (PROSCAR) 5 mg tablet Take 1 Tab by mouth daily. Qty: 90 Tab, Refills: 3         STOP taking these medications       pioglitazone (ACTOS) 30 mg tablet Comments:   Reason for Stopping:         loratadine (CLARITIN) 10 mg tablet Comments:   Reason for Stopping:               Consults:    - oncology and urology  Procedures:  -   Abdominal biopsy  Significant Diagnostic Studies:   -  CT Abdomen And Pelvis with Intravenous Contrast     INDICATION: Generalized abdominal pain.  Left upper quadrant mass.     TECHNIQUE: 5 mm collimation axial images obtained from the diaphragm to the  level of the pubic symphysis following the uneventful administration of  100 cc  of low osmolar, nonionic intravenous contrast.     Dose reduction techniques used: Automated exposure control, adjustment of the  mAs and/or kVp according to patient size, standardized low-dose protocol, and/or  iterative reconstruction technique.     COMPARISON: Chest CT from one day prior. ABDOMEN FINDINGS:     Lung Bases: Emphysema. Bibasilar atelectasis.     Liver: Normal attenuation. No evidence for mass.     Gallbladder: Present and appears normal. No biliary ductal dilatation.     Pancreas: No ductal dilatation. Mass effect on the pancreatic tail from the left  upper quadrant mass. .     Spleen: Normal in size. No evidence of mass. .     Adrenal Glands: No evidence for mass.     Kidneys:      Right: Normal enhancement. Large complex soft tissue mass arising from the  lower pole measuring approximately 7.2 x 7.2 cm. Additional right renal cyst.   No hydronephrosis.     Left:  Normal enhancement. No cortical mass. No hydronephrosis.     Lymph Nodes: Mildly enlarged portacaval lymph node measures approximately 1.6  cm.     Aorta:  Normal in caliber. Portal vein, SMV, and splenic vein are patent the  renal veins also appear patent. .     PELVIS FINDINGS:      Bowel: Small Bowel: No evidence of bowel obstruction. No definite focal bowel  wall thickening. Large left upper quadrant soft tissue mass along the greater  curvature of the stomach with calcification. .    Large Bowel: Normal in caliber with normal wall thickness. Appendix: No secondary signs of acute appendicitis.     Bladder: Underdistended.     Prostate is enlarged.     Left upper quadrant solid soft tissue mass with some areas of necrosis or cystic  change. This measures up to 8.8 x 6.5 cm.  There is also an area of suspected  metastatic disease in the left retroperitoneum centrally which measures  approximately 3.5 x 3.4 cm. This is seen on axial image 28 lateral to the SMA     No free fluid.     Bones: No definite acute finding.     IMPRESSION  Impression:     Large 7.2 x 7.2 cm mass arising from the lower pole of the right kidney is  highly concerning for renal cell carcinoma.     Large left upper quadrant mass measuring at least 8.8 x 6.5 cm with some areas  of necrosis or cystic change and calcification. This is along the greater  curvature the stomach and anterior to the pancreatic tail. This could be  metastatic disease, but additional primary malignancy cannot be excluded. Additionally, origin from this stomach or pancreatic tail cannot be entirely  excluded.     Additional suspected metastatic disease in the left retroperitoneum left lateral  to the SMA measuring approximately 3.5 x 3.4 cm.     Mildly enlarged portacaval lymph node. EXAM:  WHOLE BODY BONE SCAN          CLINICAL INDICATION:  Renal cell carcinoma diagnosis. Assessment for metastatic  lesions. COMPARISON:  CT abdomen-pelvis 12/11/18, CTA chest 12/10/18.     TECHNIQUE:  Following intravenous administration of 27.5 mCi 99mTc MDP injected  into the right antecubital fossa IV site, approximately three hour delayed  images of the whole body were obtained in anterior and posterior projections. Oblique views of the thorax, anterior and posterior views of the pelvis and  lateral views of the skull and cervical spine are also obtained.     FINDINGS:              A focus of intense radiopharmaceutical uptake is identified in the left upper  quadrant anterior aspect of the abdomen distinctly  from the left renal  uptake on the oblique images.   There is a focus of left upper quadrant mass seen  on recent CT with intense calcifications which may correlate well with this  abnormal intense increased uptake.     Regarding the right kidney lower pole mass, a vague area of relatively decreased  uptake is suggested in the lower pole region of the right kidney, much less  intense than the normal right kidney.     Regarding the skeletal structures, an unexplained focus of increased uptake is  demonstrated in the right parietal region.     The increased uptake is demonstrated in the mandible and maxilla on the right  side, presumably related to periodontal disease.     Asymmetric increased uptake in the right knee, presumably related to  osteoarthrosis.     IMPRESSION  IMPRESSION:          1. Intense uptake in the left upper quadrant anterior abdominal region  corresponding well to the CT-detected heterogeneous mass with fairly pronounced  focal area of calcifications.     - The increased uptake may correspond only to the calcified region of the mass.     2. Intense increased uptake focus in the right parietal region. Recommend  correlation with CT. Previous CT (7/2/08) appeared to show subtle sclerotic  focus in the right parietal region. Repeat scan is recommended to ascertain  whether current uptake corresponds to this lesion are not and to assess for  possible interval progression.     3. Right kidney lower pole mass appears to be relatively averse to  radiopharmaceutical, i.e. photopenic, in its native location. Therefore the  metastatic lesions may be expected to be photopenic as well. History: Renal cell carcinoma. Metastasis.     No direct comparison. Correlation is made with CT head July 2, 2008     TECHNIQUE: Multiplanar, multisequence MRI images of the head were obtained with  and without contrast.     FINDINGS:     No restricted diffusion.     No midline shift.     Ventricles and sulci are unchanged.     Left vertebral artery dominance.  Normal flow voids maintained within the  visualized vasculature.     Calvarium appears intact.     Globes and optic nerves symmetric.     Minimal paranasal sinus mucosal thickening.     Sella turcica and suprasellar region unremarkable.     Posterior fossa are unremarkable.     Corpus callosum is formed.     No definite suspect enhancement.     Mild nonspecific white matter disease, primarily noted within the frontal lobes.     IMPRESSION  IMPRESSION:     No definite suspect enhancement to suggest metastasis.     Nonspecific white matter disease. Hospital Course by Problem   70year old male presents with chest pain. Retrosternal chest pain, non radiating. Denied diaphoresis or palpitation, orthopnea or PND, cough, fever or chills. EKG and cardiac enzymes normal. Elevated d-dimer in ED and CTA chest, no PE but seen large lobulated and ill defined mass at LUQ. Admitted to hospital for further evaluation. 1. CT guided bx of LUQ mass . bx pending. completd bone scan. Brain MRI negative for metastasis. 2. Chest pain resolved. Negative troponin. 3. Oncology consult. Dr. Kaylee Nava met with wife and patient to develop a plan of care as outpatient. Awaiting results of bx.   4. Urology consult. Follow up with urology as outpatient in two to four week. If RCC cytoreductive surgery for primary neoplasm. Awaiting bx.   5. New PCP assigned at discharge for follow up    Today's examination of the patient revealed:     Subjective:    All 10 systems reviewed and negative  Objective:   VS:   Visit Vitals  /68 (BP 1 Location: Right arm, BP Patient Position: At rest)   Pulse 69   Temp 97.8 °F (36.6 °C)   Resp 16   Ht 5' 9\" (1.753 m) Comment: from previous encounter 18   Wt 61.1 kg (134 lb 9.6 oz)   SpO2 100%   BMI 19.88 kg/m²      Tmax/24hrs: Temp (24hrs), Av.6 °F (36.4 °C), Min:97.4 °F (36.3 °C), Max:97.8 °F (36.6 °C)     Input/Output:     Intake/Output Summary (Last 24 hours) at 2018 1407  Last data filed at 2018 0546  Gross per 24 hour   Intake 1210 ml   Output 900 ml   Net 310 ml       General:  Alert, NAD  Cardiovascular:  RRR  Pulmonary:  LSC throughout  GI:  +BS in all four quadrants, soft, non-tender  Extremities:  No edema; 2+ dorsalis pedis pulses bilaterally  Neurology: Patient A&O     Labs:    Recent Results (from the past 24 hour(s))   GLUCOSE, POC    Collection Time: 12/17/18  3:17 PM   Result Value Ref Range    Glucose (POC) 160 (H) 70 - 110 mg/dL   GLUCOSE, POC    Collection Time: 12/17/18  9:20 PM   Result Value Ref Range    Glucose (POC) 141 (H) 70 - 110 mg/dL   CBC WITH AUTOMATED DIFF    Collection Time: 12/18/18  2:43 AM   Result Value Ref Range    WBC 5.4 4.6 - 13.2 K/uL    RBC 3.45 (L) 4.70 - 5.50 M/uL    HGB 10.5 (L) 13.0 - 16.0 g/dL    HCT 31.0 (L) 36.0 - 48.0 %    MCV 89.9 74.0 - 97.0 FL    MCH 30.4 24.0 - 34.0 PG    MCHC 33.9 31.0 - 37.0 g/dL    RDW 12.6 11.6 - 14.5 %    PLATELET 983 033 - 845 K/uL    MPV 10.1 9.2 - 11.8 FL    NEUTROPHILS 48 40 - 73 %    LYMPHOCYTES 41 21 - 52 %    MONOCYTES 9 3 - 10 %    EOSINOPHILS 2 0 - 5 %    BASOPHILS 0 0 - 2 %    ABS. NEUTROPHILS 2.5 1.8 - 8.0 K/UL    ABS. LYMPHOCYTES 2.2 0.9 - 3.6 K/UL    ABS. MONOCYTES 0.5 0.05 - 1.2 K/UL    ABS. EOSINOPHILS 0.1 0.0 - 0.4 K/UL    ABS. BASOPHILS 0.0 0.0 - 0.1 K/UL    DF AUTOMATED     METABOLIC PANEL, BASIC    Collection Time: 12/18/18  2:43 AM   Result Value Ref Range    Sodium 140 136 - 145 mmol/L    Potassium 4.0 3.5 - 5.5 mmol/L    Chloride 105 100 - 108 mmol/L    CO2 27 21 - 32 mmol/L    Anion gap 8 3.0 - 18 mmol/L    Glucose 195 (H) 74 - 99 mg/dL    BUN 18 7.0 - 18 MG/DL    Creatinine 1.13 0.6 - 1.3 MG/DL    BUN/Creatinine ratio 16 12 - 20      GFR est AA >60 >60 ml/min/1.73m2    GFR est non-AA >60 >60 ml/min/1.73m2    Calcium 9.2 8.5 - 10.1 MG/DL   GLUCOSE, POC    Collection Time: 12/18/18  7:33 AM   Result Value Ref Range    Glucose (POC) 186 (H) 70 - 110 mg/dL   GLUCOSE, POC    Collection Time: 12/18/18 11:12 AM   Result Value Ref Range    Glucose (POC) 182 (H) 70 - 110 mg/dL     Additional Data Reviewed:     Condition: stable  Follow-up Appointments:   1. Your PCP: , within 5-7 days. New PCP  2. Follow up with urology in two to four weeks.  Office will call for appointment  3. Follow up with oncology in two to four weeks.          >30 minutes spent coordinating this discharge (review instructions/follow-up, prescriptions, preparing report for sign off)    Signed:  Sherrie Patel NP  12/18/2018  2:07 PM

## 2018-12-18 NOTE — PROGRESS NOTES
Urology Progress Note        Assessment/Plan:       ASSESSMENT:   1. Large lower pole right renal mass concerning for RCC. 2. Left upper quadrant mass s/p CT guided bx 18. bone scan w/ new Intense increased uptake focus in the right parietal           region. Brain MRI w/o mets  3. Hx elevated PSA, BPH w/LUTS - followed by Dr. Jona Brand       PLAN:    Biopsy results pending  Treatment pending path results. Will arrange f/up with Dr. Gio Gomez as outpt to further discuss treatment options of right renal mass - message sent to Dinah Marie to arrange  Appreciate VOA assistance in managing patient. Will sign off at this time. Please contact with any questions or concerns    Jose Aguirre. Janey [facility]      Patient Active Problem List   Diagnosis Code    Blister of foot or toe [id number]    DM w/o complication type II ([address] E11.9    Hypertension I10    Penis pain N48.89    BPH (benign prostatic hyperplasia) N40.0    Lower urinary tract symptoms (LUTS) R39.9    Elevated prostate specific antigen (PSA) R97.20    Abnormal prostate exam R39.89    Schizophrenia ([address] F20.9    Abdominal mass R19.00    Acute chest pain R07.9         Makayla Brunner NP    [phone]      Subjective:     No acute events overnight. Denies any pain nausea or vomiting. Objective:     Visit Vitals  /76 (BP 1 Location: Left arm, BP Patient Position: At rest)   Pulse 72   Temp 97.5 °F (36.4 °C)   Resp 16   Ht 5' 9\" (1.753 m) Comment: from previous encounter 18   Wt 134 lb 9.6 oz (61.1 kg)   SpO2 100%   BMI 19.88 kg/m²        Temp (24hrs), Av.5 °F (36.4 °C), Min:97.3 °F (36.3 °C), Max:97.7 °F (36.5 °C)      Intake and Output:   1901 -  0700  In: 2682 [P.O.:1450]  Out: 900 [Urine:900]  No intake/output data recorded.     PHYSICAL EXAMINATION:   Visit Vitals  /76 (BP 1 Location: Left arm, BP Patient Position: At rest)   Pulse 72   Temp 97.5 °F (36.4 °C)   Resp 16   Ht 5' 9\" (1.753 m) Comment: from previous encounter 11/11/18   Wt 134 lb 9.6 oz (61.1 kg)   SpO2 100%   BMI 19.88 kg/m²     Constitutional: Well developed, well nourished. No acute distress. HEENT: Normocephalic, Atraumatic,   CV:  RRR  Respiratory: No respiratory distress or difficulties breathing   Abdomen:  non-distended  Skin: No evidence of jaundice. Normal color  Neuro/Psych:  Alert and oriented. Affect appropriate. Lymphatic:  No enlarged supraclavicular lymph nodes. MSK: HOLLOWAY strong      Lab/Data Review: All lab results for the last 24 hours reviewed. Labs:     Labs: Results:   Chemistry    Recent Labs     12/18/18  0243 12/17/18  0116 12/16/18  0107   * 173* 147*    138 140   K 4.0 3.6 3.8    102 105   CO2 27 28 27   BUN 18 20* 17   CREA 1.13 1.32* 1.08   CA 9.2 9.2 9.2   AGAP 8 8 8   BUCR 16 15 16      CBC w/Diff Recent Labs     12/18/18  0243 12/17/18  0116 12/16/18  0107   WBC 5.4 7.2 5.2   RBC 3.45* 3.87* 3.57*   HGB 10.5* 12.0* 10.8*   HCT 31.0* 35.3* 32.0*    268 233   GRANS 48 43 50   LYMPH 41 46 38   EOS 2 2 2      Cultures No results for input(s): CULT in the last 72 hours. All Micro Results     None            Urinalysis Potassium   Date Value Ref Range Status   12/18/2018 4.0 3.5 - 5.5 mmol/L Final     Creatinine   Date Value Ref Range Status   12/18/2018 1.13 0.6 - 1.3 MG/DL Final     BUN   Date Value Ref Range Status   12/18/2018 18 7.0 - 18 MG/DL Final     Prostate Specific Ag   Date Value Ref Range Status   03/02/2018 4.540 ng/mL Final      PSA No results for input(s): PSA in the last 72 hours.    Coagulation Lab Results   Component Value Date/Time    Prothrombin time 13.4 12/13/2018 03:02 AM    INR 1.1 12/13/2018 03:02 AM    aPTT 35.6 12/13/2018 03:02 AM

## 2018-12-18 NOTE — ROUTINE PROCESS
Bedside and Verbal report received from [facility]. Report included SBAR, VS, LABS and summary of care. Patient resting in bed quietly and stated no pain. Call bell at reach. Will continue to monitor.

## 2018-12-18 NOTE — PROGRESS NOTES
Problem: Pressure Injury - Risk of  Goal: *Prevention of pressure injury  Document James Scale and appropriate interventions in the flowsheet.   Pressure Injury Interventions:  Sensory Interventions: Pressure redistribution bed/mattress (bed type)    Moisture Interventions: Check for incontinence Q2 hours and as needed    Activity Interventions: Increase time out of bed         Nutrition Interventions: Document food/fluid/supplement intake

## 2018-12-18 NOTE — PROGRESS NOTES
Problem: Self Care Deficits Care Plan (Adult)  Goal: *Acute Goals and Plan of Care (Insert Text)  Outcome: Resolved/Met Date Met: 12/18/18  Occupational Therapy EVALUATION/discharge    Patient: Leann Bernal (89 y.o. male)  Date: 12/18/2018  Primary Diagnosis: Acute chest pain  Abdominal mass  Acute chest pain         ASSESSMENT AND RECOMMENDATIONS:  Mr. Eileen Johnson is a pleasant 70 yr old male admitted to the hospital with chest pain. During the OT eval today, the pt completed bed mobility, sit to stand, lower body dressing, ambulating ~60 feet without an AD, and grooming in standing at sink level independently. The pt is not presenting with functional deficits that would the need for further OT services, and he appears to be at his baseline level of functioning for ADLs and functional mobility. As such, OT will sign off & recommend the pt returns home at discharge. Skilled occupational therapy is not indicated at this time. Discharge Recommendations: Return home  Further Equipment Recommendations for Discharge: N/A      Barriers to Learning/Limitations: None  Compensate with: visual, verbal, tactile, kinesthetic cues/model     COMPLEXITY     Eval Complexity: History: LOW Complexity : Brief history review ; Examination: LOW Complexity : 1-3 performance deficits relating to physical, cognitive , or psychosocial skils that result in activity limitations and / or participation restrictions ; Decision Making:MEDIUM Complexity : Patient may present with comorbidities that affect occupational performnce. Miniml to moderate modification of tasks or assistance (eg, physical or verbal ) with assesment(s) is necessary to enable patient to complete evaluation  Assessment: Low Complexity        G-CODES:     Self Care  Current  CH= 0%   Goal  CH= 0%   D/C  CH= 0%. The severity rating is based on the Level of Assistance required for Functional Mobility and ADLs.     SUBJECTIVE:   Patient was agreeable to participate in the OT session. OBJECTIVE DATA SUMMARY:     Past Medical History:   Diagnosis Date    Blister of foot or toe     BPH (benign prostatic hyperplasia)     DM w/o complication type II ([address]     Elevated prostate specific antigen (PSA)     Hypertension     Lower urinary tract symptoms (LUTS)     Nodular prostate without urinary obstruction     Unspecified disorder of penis      Past Surgical History:   Procedure Laterality Date    HX UROLOGICAL  7/11/2011     Negative Prostate Biopsy; Dr. Umang Garces, SUSPICIOUS RIGHT APEX FROM 03/2010     Prior Level of Function/Home Situation: Patient was independent with ADLs, ambulation, and driving. Home Situation  Home Environment: Private residence  # Steps to Enter: 3  Rails to Enter: Yes  Hand Rails : Bilateral  One/Two Story Residence: One story  Living Alone: No(Lives with his spouse.)  Support Systems: Family member(s)  Patient Expects to be Discharged to[de-identified] Private residence  Current DME Used/Available at Home: Shower chair  Tub or Shower Type: (walk-in shower )  [x]     Right hand dominant   []     Left hand dominant  Cognitive/Behavioral Status:  Neurologic State: Alert  Orientation Level: Oriented X4  Cognition: Appropriate decision making; Appropriate for age attention/concentration; Appropriate safety awareness; Follows commands  Safety/Judgement: Good awareness of safety precautions    Skin: Visible skin appeared intact     Edema: none noted     Vision/Perceptual:            Acuity: (WFL)         Coordination:  Coordination: Within functional limits(BUE and BLE)          Balance:  Sitting: Intact  Standing: Intact    Strength:  Strength:  Within functional limits for BUE and BLE; BUE strength 5/5        Tone & Sensation:  Tone: Normal(BUE and BLE)        Range of Motion:  AROM: Within functional limits(BUE and BLE)        Functional Mobility and Transfers for ADLs:  Bed Mobility:     Supine to Sit: Independent  Sit to Supine: Independent  Scooting: Independent Transfers:  Sit to Stand: Independent       Toilet Transfer : Independent      ADL Assessment:  Feeding: Independent    Oral Facial Hygiene/Grooming: Independent    Bathing: Independent    Upper Body Dressing: Independent    Lower Body Dressing: Independent    Toileting: Independent      Cognitive Retraining  Safety/Judgement: Good awareness of safety precautions      Pain:  Pt reports 0/10 pain or discomfort prior to treatment.    Pt reports 0/10 pain or discomfort post treatment. Activity Tolerance:   good    Please refer to the flowsheet for vital signs taken during this treatment. After treatment:   []  Patient left in no apparent distress sitting up in chair  [x]  Patient left in no apparent distress in bed  [x]  Call bell left within reach  []  Nursing notified  []  Caregiver present  []  Bed alarm activated    COMMUNICATION/EDUCATION:   Communication/Collaboration:  [x]      Home safety education was provided and the patient/caregiver indicated understanding. [x]      Patient have participated as able and agree with findings and recommendations. []      Patient is unable to participate in plan of care at this time.     Sukhdeep Lackey OT  Time Calculation: 13 mins

## 2018-12-18 NOTE — DISCHARGE SUMMARY
.1 Chest pain, atypical. Resolved  2. Large right kidney mass, lower pole with retroperitoneal soft tissue masses concerning for renal neoplasm and metastasis  3. LUQ mass  4. DM II  5. HTN  6. Schizophrenia  7. Hx PSA elevated, BPH with LUTS. Followed by urology    - S/B urology   - S/B Oncology   - Biopsy of mass pending     Patient interviewed and examined independently .    Management plan reviewed   APC's note reviewed , agreed with exam and A&P

## 2018-12-18 NOTE — DISCHARGE INSTRUCTIONS
Your A1C  was   Lab Results   Component Value Date/Time    Hemoglobin A1c 8.7 (H) 12/11/2018 12:16 AM   .    This lab test reflects that your blood sugar averaged 203 mg/dL over the past 3 months. It is important to follow up with your provider on a routine basis to continue to evaluate your blood sugar discuss any necessary changes in treatment. Chest Pain: Care Instructions  Your Care Instructions    There are many things that can cause chest pain. Some are not serious and will get better on their own in a few days. But some kinds of chest pain need more testing and treatment. Your doctor may have recommended a follow-up visit in the next 8 to 12 hours. If you are not getting better, you may need more tests or treatment. Even though your doctor has released you, you still need to watch for any problems. The doctor carefully checked you, but sometimes problems can develop later. If you have new symptoms or if your symptoms do not get better, get medical care right away. If you have worse or different chest pain or pressure that lasts more than 5 minutes or you passed out (lost consciousness), call 911 or seek other emergency help right away. A medical visit is only one step in your treatment. Even if you feel better, you still need to do what your doctor recommends, such as going to all suggested follow-up appointments and taking medicines exactly as directed. This will help you recover and help prevent future problems. How can you care for yourself at home? · Rest until you feel better. · Take your medicine exactly as prescribed. Call your doctor if you think you are having a problem with your medicine. · Do not drive after taking a prescription pain medicine. When should you call for help? Call 911 if:    · You passed out (lost consciousness).     · You have severe difficulty breathing.     · You have symptoms of a heart attack. These may include:  ?  Chest pain or pressure, or a strange feeling in your chest.  ? Sweating. ? Shortness of breath. ? Nausea or vomiting. ? Pain, pressure, or a strange feeling in your back, neck, jaw, or upper belly or in one or both shoulders or arms. ? Lightheadedness or sudden weakness. ? A fast or irregular heartbeat. After you call 911, the  may tell you to chew 1 adult-strength or 2 to 4 low-dose aspirin. Wait for an ambulance. Do not try to drive yourself.    Call your doctor today if:    · You have any trouble breathing.     · Your chest pain gets worse.     · You are dizzy or lightheaded, or you feel like you may faint.     · You are not getting better as expected.     · You are having new or different chest pain. Where can you learn more? Go to http://iron-janene.info/. Enter A120 in the search box to learn more about \"Chest Pain: Care Instructions. \"  Current as of: November 20, 2017  Content Version: 11.8  © 1255-7322 [employer]. Care instructions adapted under license by [employer] (which disclaims liability or warranty for this information). If you have questions about a medical condition or this instruction, always ask your healthcare professional. [place] [address] any warranty or liability for your use of this information. Patient armband removed and shredded  MyChart Activation    Thank you for requesting access to [employer]. Please follow the instructions below to securely access and download your online medical record. [employer] allows you to send messages to your doctor, view your test results, renew your prescriptions, schedule appointments, and more. How Do I Sign Up? 1. In your internet browser, go to [email or web address]  2. Click on the First Time User? Click Here link in the Sign In box. You will be redirect to the New Member Sign Up page. 3. Enter your [employer] Access Code exactly as it appears below.  You will not need to use this code after youve completed the sign-up process. If you do not sign up before the expiration date, you must request a new code. Pins Access Code: Activation code not generated  Current Pins Status: Active (This is the date your Pins access code will )    4. Enter the last four digits of your Social Security Number (xxxx) and Date of Birth (mm/dd/yyyy) as indicated and click Submit. You will be taken to the next sign-up page. 5. Create a NovoPolymerst ID. This will be your Pins login ID and cannot be changed, so think of one that is secure and easy to remember. 6. Create a Pins password. You can change your password at any time. 7. Enter your Password Reset Question and Answer. This can be used at a later time if you forget your password. 8. Enter your e-mail address. You will receive e-mail notification when new information is available in [address] 9. Click Sign Up. You can now view and download portions of your medical record. 10. Click the Download Summary menu link to download a portable copy of your medical information. Additional Information    If you have questions, please visit the Frequently Asked Questions section of the Pins website at [email or web address] Remember, Pins is NOT to be used for urgent needs. For medical emergencies, dial 911. DISCHARGE SUMMARY from Nurse    PATIENT INSTRUCTIONS:    After general anesthesia or intravenous sedation, for 24 hours or while taking prescription Narcotics:  · Limit your activities  · Do not drive and operate hazardous machinery  · Do not make important personal or business decisions  · Do  not drink alcoholic beverages  · If you have not urinated within 8 hours after discharge, please contact your surgeon on call.     Report the following to your surgeon:  · Excessive pain, swelling, redness or odor of or around the surgical area  · Temperature over 100.5  · Nausea and vomiting lasting longer than 4 hours or if unable to take medications  · Any signs of decreased circulation or nerve impairment to extremity: change in color, persistent  numbness, tingling, coldness or increase pain  · Any questions    What to do at Home:  Recommended activity: Activity as tolerated,     If you experience any of the following symptoms Shortness of breath, Nausea or Vomiting lasting longer than 4 hours, Fever greater than 100.4, please follow up with Primary Care Provider. *  Please give a list of your current medications to your Primary Care Provider. *  Please update this list whenever your medications are discontinued, doses are      changed, or new medications (including over-the-counter products) are added. *  Please carry medication information at all times in case of emergency situations. These are general instructions for a healthy lifestyle:    No smoking/ No tobacco products/ Avoid exposure to second hand smoke  Surgeon General's Warning:  Quitting smoking now greatly reduces serious risk to your health. Obesity, smoking, and sedentary lifestyle greatly increases your risk for illness    A healthy diet, regular physical exercise & weight monitoring are important for maintaining a healthy lifestyle    You may be retaining fluid if you have a history of heart failure or if you experience any of the following symptoms:  Weight gain of 3 pounds or more overnight or 5 pounds in a week, increased swelling in our hands or feet or shortness of breath while lying flat in bed. Please call your doctor as soon as you notice any of these symptoms; do not wait until your next office visit. Recognize signs and symptoms of STROKE:    F-face looks uneven    A-arms unable to move or move unevenly    S-speech slurred or non-existent    T-time-call 911 as soon as signs and symptoms begin-DO NOT go       Back to bed or wait to see if you get better-TIME IS BRAIN.     Warning Signs of HEART ATTACK     Call 911 if you have these symptoms:   Chest discomfort. Most heart attacks involve discomfort in the center of the chest that lasts more than a few minutes, or that goes away and comes back. It can feel like uncomfortable pressure, squeezing, fullness, or pain.  Discomfort in other areas of the upper body. Symptoms can include pain or discomfort in one or both arms, the back, neck, jaw, or stomach.  Shortness of breath with or without chest discomfort.  Other signs may include breaking out in a cold sweat, nausea, or lightheadedness. Don't wait more than five minutes to call 911 - MINUTES MATTER! Fast action can save your life. Calling 911 is almost always the fastest way to get lifesaving treatment. Emergency Medical Services staff can begin treatment when they arrive -- up to an hour sooner than if someone gets to the hospital by car. The discharge information has been reviewed with the patient. The patient verbalized understanding. Discharge medications reviewed with the patient and appropriate educational materials and side effects teaching were provided.   ___________________________________________________________________________________________________________________________________

## 2018-12-18 NOTE — ROUTINE PROCESS
Bedside and Verbal shift change report given to [facility] (oncoming nurse) by Kristine Lloyd RN (offgoing nurse). Report included the following information SBAR, Kardex, Intake/Output and Recent Results. Patient resting quietlly in bed. No complaints of pain or shortness of breath.

## 2018-12-18 NOTE — PROGRESS NOTES
Problem: Mobility Impaired (Adult and Pediatric)  Goal: *Acute Goals and Plan of Care (Insert Text)  Outcome: Resolved/Met Date Met: 12/18/18  physical Therapy EVALUATION & Discharge    Patient: Clarisa Almeida (11 y.o. male)  Date: 12/18/2018  Primary Diagnosis: Acute chest pain  Abdominal mass  Acute chest pain       Precautions: none       ASSESSMENT AND RECOMMENDATIONS:  Based on the objective data described below, the patient presents at independent level with ambulation and transfers without an assistive device. Skilled physical therapy is not indicated at this time. Discharge Recommendations: None  Further Equipment Recommendations for Discharge: N/A      G-CODES:     Mobility  Current  CH= 0%   Goal  CH= 0%  D/C  CH= 0%. The severity rating is based on the Level of Assistance required for Functional Mobility and ADLs. Eval Complexity: History: HIGH Complexity :3+ comorbidities / personal factors will impact the outcome/ POC Exam:LOW Complexity : 1-2 Standardized tests and measures addressing body structure, function, activity limitation and / or participation in recreation  Presentation: MEDIUM Complexity : Evolving with changing characteristics  Clinical Decision Making:Low Complexity , Overall Complexity:LOW     SUBJECTIVE:   Patient stated It feels good to walk.     OBJECTIVE DATA SUMMARY:     Past Medical History:   Diagnosis Date    Blister of foot or toe     BPH (benign prostatic hyperplasia)     DM w/o complication type II (HCC)     Elevated prostate specific antigen (PSA)     Hypertension     Lower urinary tract symptoms (LUTS)     Nodular prostate without urinary obstruction     Unspecified disorder of penis      Past Surgical History:   Procedure Laterality Date    HX UROLOGICAL  7/11/2011     Negative Prostate Biopsy; Dr. Deborah Wang, SUSPICIOUS RIGHT APEX FROM 03/2010     Barriers to Learning/Limitations: None  Compensate with: visual, verbal, tactile, kinesthetic cues/model  Prior Level of Function/Home Situation: independent with mobility without an assistive device  Home Situation  Home Environment: Private residence  # Steps to Enter: 0  One/Two Story Residence: One story  Living Alone: No  Support Systems: Family member(s)  Patient Expects to be Discharged to[de-identified] Private residence  Current DME Used/Available at Home: None  Critical Behavior:   calm and cooperative   Strength:    Strength: Within functional limits(B Les)      Tone & Sensation:   Tone: Normal(B LEs)   Range Of Motion:  AROM: Within functional limits(B LEs)      Functional Mobility:  Bed Mobility:  Supine to Sit: Independent  Sit to Supine: Independent     Transfers:  Sit to Stand: Independent  Stand to Sit: Independent  Balance:   Sitting: Intact  Standing: Intact; Without support  Ambulation/Gait Training:  Distance (ft): 600 Feet (ft)  Assistive Device: (none)  Ambulation - Level of Assistance: Independent      Pain:  Pt reports 0/10 pain or discomfort prior to treatment.    Pt reports 0/10 pain or discomfort post treatment. Activity Tolerance:   good  Please refer to the flowsheet for vital signs taken during this treatment. After treatment:   []         Patient left in no apparent distress sitting up in chair  [x]         Patient left in no apparent distress in bed  [x]         Call bell left within reach  [x]         Nursing notified  []         Caregiver present  []         Bed alarm activated    COMMUNICATION/EDUCATION:   [x]         Fall prevention education was provided and the patient/caregiver indicated understanding. [x]         Patient/family have participated as able in goal setting and plan of care.-eval only  []         Patient/family agree to work toward stated goals and plan of care. []         Patient understands intent and goals of therapy, but is neutral about his/her participation. []         Patient is unable to participate in goal setting and plan of care.   Educated patient on increasing activity throughout the day    Thank you for this referral.  Wilma Johns, PT   Time Calculation: 10 mins

## 2018-12-18 NOTE — PROGRESS NOTES
Phone: [phone]     Hematology / Oncology Progress Note  [facility]      Patient: Mayito Montalvo   MRN: [id number]         CSN: [id number]    YOB: 1955      Admit Date: 12/10/2018    Assessment:     Active Problems:    Abdominal mass (12/10/2018)      Acute chest pain (12/10/2018)    rt renal mass, retroperitoneal masses    Plan:     Biopsy results pending from retroperitoneal mass  Ct brain no mets  Met with pt and wife  F/u in office  To discuss results and recommendations for systemic therapy  Pt should f/u with urology as well, if RCC,  cytoreductive surgery for primary neoplasm  Needs new PCP as well.     Kaid Madison MD  [facility] [phone]      Subjective:     No new complaints    Objective:     Visit Vitals  /76 (BP 1 Location: Left arm, BP Patient Position: At rest)   Pulse 72   Temp 97.5 °F (36.4 °C)   Resp 16   Ht 5' 9\" (1.753 m) Comment: from previous encounter 18   Wt 61.1 kg (134 lb 9.6 oz)   SpO2 100%   BMI 19.88 kg/m²             Temp (24hrs), Av.5 °F (36.4 °C), Min:97.3 °F (36.3 °C), Max:97.7 °F (36.5 °C)        Intake/Output Summary (Last 24 hours) at 2018 0842  Last data filed at 2018 0546  Gross per 24 hour   Intake 1450 ml   Output 900 ml   Net 550 ml     Review of Systems:   Constitutional: negative for fevers, chills, sweats and fatigue  Eyes: negative for visual disturbance, redness and icterus  Ears, Nose, Mouth, Throat, and Face: negative for tinnitus, epistaxis, sore mouth and hoarseness  Respiratory: negative for cough, sputum, hemoptysis, pleurisy/chest pain or wheezing  Cardiovascular: negative for chest pain, chest pressure/discomfort, palpitations, irregular heart beats, syncope, paroxysmal nocturnal dyspnea  Gastrointestinal: negative for reflux symptoms, nausea, vomiting, change in bowel habits, melena, diarrhea, constipation and abdominal pain  Genitourinary:negative for dysuria, nocturia, urinary incontinence, hesitancy and hematuria  Integument: negative for rash, skin lesion(s) and pruritus  Hematologic/Lymphatic: negative for easy bruising, bleeding and lymphadenopathy  Musculoskeletal:negative for myalgias, arthralgias and bone pain  Neurological: negative for headaches, dizziness, seizures, paresthesia and weakness    Physical Exam: thin built  Constitutional: Alert, oriented, not in distress  Eyes: PERRLA, anicteric, no redness  Ears, nose, mouth, throat, and face: no palpable Lymph nodes, no mucositis, no thrush. Respiratory: symmetrical expansion, no rales, no rhonchi, no wheezing. Cardiovascular: S1S2, no pathologic murmur, no rub. Gastrointestinal: soft, benign, non tender, no HSM, normal bowel sounds, no mass. Integument: no rash, no petechiae, no ecchymosis. Musculoskeletal: no edema, no cyanosis, no clubbing. Neurological: intact, cranial nerves, no focal motor or sensory deficits. Labs:  Recent Results (from the past 24 hour(s))   GLUCOSE, POC    Collection Time: 12/17/18 10:34 AM   Result Value Ref Range    Glucose (POC) 203 (H) 70 - 110 mg/dL   GLUCOSE, POC    Collection Time: 12/17/18  3:17 PM   Result Value Ref Range    Glucose (POC) 160 (H) 70 - 110 mg/dL   GLUCOSE, POC    Collection Time: 12/17/18  9:20 PM   Result Value Ref Range    Glucose (POC) 141 (H) 70 - 110 mg/dL   CBC WITH AUTOMATED DIFF    Collection Time: 12/18/18  2:43 AM   Result Value Ref Range    WBC 5.4 4.6 - 13.2 K/uL    RBC 3.45 (L) 4.70 - 5.50 M/uL    HGB 10.5 (L) 13.0 - 16.0 g/dL    HCT 31.0 (L) 36.0 - 48.0 %    MCV 89.9 74.0 - 97.0 FL    MCH 30.4 24.0 - 34.0 PG    MCHC 33.9 31.0 - 37.0 g/dL    RDW 12.6 11.6 - 14.5 %    PLATELET 328 598 - 281 K/uL    MPV 10.1 9.2 - 11.8 FL    NEUTROPHILS 48 40 - 73 %    LYMPHOCYTES 41 21 - 52 %    MONOCYTES 9 3 - 10 %    EOSINOPHILS 2 0 - 5 %    BASOPHILS 0 0 - 2 %    ABS. NEUTROPHILS 2.5 1.8 - 8.0 K/UL    ABS. LYMPHOCYTES 2.2 0.9 - 3.6 K/UL    ABS. MONOCYTES 0.5 0.05 - 1.2 K/UL    ABS. EOSINOPHILS 0.1 0.0 - 0.4 K/UL    ABS.  BASOPHILS 0.0 0.0 - 0.1 K/UL    DF AUTOMATED     METABOLIC PANEL, BASIC    Collection Time: 12/18/18  2:43 AM   Result Value Ref Range    Sodium 140 136 - 145 mmol/L    Potassium 4.0 3.5 - 5.5 mmol/L    Chloride 105 100 - 108 mmol/L    CO2 27 21 - 32 mmol/L    Anion gap 8 3.0 - 18 mmol/L    Glucose 195 (H) 74 - 99 mg/dL    BUN 18 7.0 - 18 MG/DL    Creatinine 1.13 0.6 - 1.3 MG/DL    BUN/Creatinine ratio 16 12 - 20      GFR est AA >60 >60 ml/min/1.73m2    GFR est non-AA >60 >60 ml/min/1.73m2    Calcium 9.2 8.5 - 10.1 MG/DL   GLUCOSE, POC    Collection Time: 12/18/18  7:33 AM   Result Value Ref Range    Glucose (POC) 186 (H) 70 - 110 mg/dL

## 2018-12-18 NOTE — PROGRESS NOTES
D/c noted for today, no d/c needs. Pt's wife will transport pt home.      COTY Goodman  Case Management  [phone]

## 2018-12-18 NOTE — ROUTINE PROCESS
Bedside shift change report given to Balta (oncoming nurse) by Albin Jarrell (offgoing nurse). Report included the following information SBAR, Kardex and MAR.

## 2019-01-09 ENCOUNTER — OFFICE VISIT (OUTPATIENT)
Dept: FAMILY MEDICINE CLINIC | Age: 64
End: 2019-01-09

## 2019-01-09 VITALS
HEIGHT: 69 IN | HEART RATE: 82 BPM | RESPIRATION RATE: 18 BRPM | WEIGHT: 141.6 LBS | SYSTOLIC BLOOD PRESSURE: 126 MMHG | TEMPERATURE: 97.7 F | DIASTOLIC BLOOD PRESSURE: 83 MMHG | BODY MASS INDEX: 20.97 KG/M2 | OXYGEN SATURATION: 96 %

## 2019-01-09 DIAGNOSIS — F20.9 SCHIZOPHRENIA, UNSPECIFIED TYPE (HCC): Primary | ICD-10-CM

## 2019-01-09 DIAGNOSIS — Z11.59 NEED FOR HEPATITIS C SCREENING TEST: ICD-10-CM

## 2019-01-09 DIAGNOSIS — N40.0 BENIGN PROSTATIC HYPERPLASIA WITHOUT LOWER URINARY TRACT SYMPTOMS: ICD-10-CM

## 2019-01-09 DIAGNOSIS — E78.00 PURE HYPERCHOLESTEROLEMIA: ICD-10-CM

## 2019-01-09 DIAGNOSIS — E11.9 TYPE 2 DIABETES MELLITUS WITHOUT COMPLICATION, WITHOUT LONG-TERM CURRENT USE OF INSULIN (HCC): ICD-10-CM

## 2019-01-09 DIAGNOSIS — I10 ESSENTIAL HYPERTENSION: ICD-10-CM

## 2019-01-09 NOTE — PROGRESS NOTES
Chief Complaint Patient presents with [facility] NP  
 
 
HPI: 
Hari Sanchez is a 70 y.o. male who presents today as a new patient to establish care. Medical history includes hypertension, diabetes mellitus type 2, hyperlipidemia, schizophrenia and followed by Dr. Cem Graham, benign prostatic hypertrophy followed by urology, constipation on Colace, etc. He feels well and denies CP, SOB, orthopnea, PND, fever, chills, headache, dizziness, visual disturbances, syncope, abdominal pain, diarrhea, cough, rectal bleeding, urinary symptoms, back or flank pain, pain or weakness in extremities, sore throat, ear aches, nausea, vomiting, or other complaints today. He denies tobacco use, ethanol use, or illicit drug use. He is  and has no biological children. He was previously employed at [employer] but has not worked since October. Previous PCP was Dr. Louisa Durand. Past Medical History:  
Diagnosis Date  Blister of foot or toe  BPH (benign prostatic hyperplasia)  DM w/o complication type II ([facility] [address] [facility]  Elevated prostate specific antigen (PSA)  Hypertension  Lower urinary tract symptoms (LUTS)  Nodular prostate without urinary obstruction  Unspecified disorder of penis No Known Allergies Current Outpatient Medications Medication Sig Dispense Refill  haloperidol (HALDOL) 10 mg tablet Take 10 mg by mouth.  multivitamin (ONE A DAY) tablet Take 1 Tab by mouth daily.  loratadine 10 mg cap Take  by mouth.  benztropine (COGENTIN) 0.5 mg tablet Take 1 Tab by mouth two (2) times a day. 60 Tab 0  
 melatonin 3 mg tablet Take 2 Tabs by mouth nightly. 30 Tab 0  
 aspirin delayed-release 81 mg tablet 81 mg.    
 docusate sodium (COLACE) 100 mg capsule 100 mg.    
 finasteride (PROSCAR) 5 mg tablet Take 1 Tab by mouth daily. 90 Tab 3  
 glimepiride (AMARYL) 4 mg tablet Take 1 Tab by mouth every morning.  90 Tab 0  
  dilTIAZem CD (CARDIZEM CD) 120 mg ER capsule Take 1 Cap by mouth daily. 90 Cap 0  
 atorvastatin (LIPITOR) 10 mg tablet Take 1 Tab by mouth daily. 90 Tab 0  
 lisinopril (PRINIVIL, ZESTRIL) 20 mg tablet Take 1 Tab by mouth daily. 90 Tab 0  
 acetaminophen (TYLENOL) 500 mg tablet Take  by mouth every six (6) hours as needed for Pain. Past Surgical History:  
Procedure Laterality Date  HX UROLOGICAL  7/11/2011 Negative Prostate Biopsy; Dr. Hyacinth Dugan RIGHT APEX FROM 03/2010 No family history on file. Social History Socioeconomic History  Marital status:  Spouse name: Not on file  Number of children: Not on file  Years of education: Not on file  Highest education level: Not on file Tobacco Use  Smoking status: Never Smoker  Smokeless tobacco: Never Used Substance and Sexual Activity  Alcohol use: No  
  Alcohol/week: 0.0 oz  Drug use: No  
 
 
ROS: 
Constitutional: Negative for fever, fatigue, or chills HEENT: Negative for headache, dizziness, visual disturbance, nasal congestion, ear pain, sore throat Skin: Negative for rash, bruises, lesions Lungs:  Negative for SOB, cough, mucus production Heart: Negative for chest pain, chest discomfort Abdomen: Negative for abdominal pain, N/V, diarrhea, constipation Genitourinary: Negative for pain on urination, blood in urine, penile discharge, genital lesions or sores Neurological: Negative for numbness in extremities, tremors, confusion, speech disturbance, gait imbalance, weakness Psychological:Negative for depression, mood changes, anxiety, sleep disturbance Heme: Negative for easy bruisability or bleeding Endo: Negative for heat or cold intolerance, frequency with urination, or change in appetite Musculoskeletal; Negative for muscle or joint aches or pain. Physical Exam: 
Visit Vitals /83 Pulse 82 Temp 97.7 °F (36.5 °C) (Oral) Resp 18 Ht 5' 9\" (1.753 m) Wt 141 lb 9.6 oz (64.2 kg) SpO2 96% BMI 20.91 kg/m² BP Readings from Last 3 Encounters:  
01/09/19 126/83  
01/07/19 118/74  
12/18/18 112/74 General: a & o x 3, afebrile, comfortable, well-nourished, interacting appropriately, in no acute distress HEENT: head normocephalic and atraumatic, conjuctiva clear, PERRLA, EOMI, nose clear, turbinates with no erythema or swelling, ear canals clear, no erythema or swelling, pharynx and tonsils with no erythema or exudates Mouth: oral mucosa moist, normal appearing dentition, no dental caries, no gum swelling, no oral lesions Skin: color race -appropriate, warm and dry, no rashes , no bruises Neck: supple, symmetrical, no thyromegaly Respiratory: lung sounds clear, good air entry, normal respiratory effort, no wheezes or crackles Cardiovascular: normal S1S2, regular rate and rhythm, no murmurs, capillary refills < 2 sec, pulses palpable, no edema, no carotid or abdominal bruits, no JVD Abdomen: non-distended, normoactive bowel sounds x 4 quadrants, soft, non-tender to palpation, no palpable mass, no hepatomegaly or splenomegaly, no guarding or rigidity, no CVA tenderness Musculoskeletal: normal ROM on all joints, no swelling or deformity, no perilumbar tenderness, no multiple trigger points Neurological: DTRs intact symmetrically and bilaterally Psychological: Appropriate mood and affect Lab Results Component Value Date/Time WBC 5.4 12/18/2018 02:43 AM  
 HGB 10.5 (L) 12/18/2018 02:43 AM  
 HCT 31.0 (L) 12/18/2018 02:43 AM  
 PLATELET 676 85/76/7144 02:43 AM  
 MCV 89.9 12/18/2018 02:43 AM  
 
 
Lab Results Component Value Date/Time  Sodium 140 12/18/2018 02:43 AM  
 Potassium 4.0 12/18/2018 02:43 AM  
 Chloride 105 12/18/2018 02:43 AM  
 CO2 27 12/18/2018 02:43 AM  
 Anion gap 8 12/18/2018 02:43 AM  
 Glucose 195 (H) 12/18/2018 02:43 AM  
 BUN 18 12/18/2018 02:43 AM  
 Creatinine 1.13 12/18/2018 02:43 AM  
 BUN/Creatinine ratio 16 12/18/2018 02:43 AM  
 GFR est AA >60 12/18/2018 02:43 AM  
 GFR est non-AA >60 12/18/2018 02:43 AM  
 Calcium 9.2 12/18/2018 02:43 AM  
 Bilirubin, total 0.3 12/13/2018 03:02 AM  
 AST (SGOT) 15 12/13/2018 03:02 AM  
 Alk. phosphatase 62 12/13/2018 03:02 AM  
 Protein, total 6.9 12/13/2018 03:02 AM  
 Albumin 3.4 12/13/2018 03:02 AM  
 Globulin 3.5 12/13/2018 03:02 AM  
 A-G Ratio 1.0 12/13/2018 03:02 AM  
 ALT (SGPT) 17 12/13/2018 03:02 AM  
 
 
No results found for: CHOL, CHOLPOCT, CHOLX, CHLST, CHOLV, HDL, HDLPOC, LDL, LDLCPOC, LDLC, DLDLP, VLDLC, VLDL, TGLX, TRIGL, TRIGP, TGLPOCT, CHHD, CHHDX No results found for: [id number] Lab Results Component Value Date/Time Hemoglobin A1c 8.7 (H) 12/11/2018 12:16 AM  
 
 
Lab Results Component Value Date/Time TSH 1.12 07/21/2010 06:10 AM  
 
 
Assessment/Plan: ICD-10-CM ICD-9-CM 1. Sch[facility], unspecified type ([facility] F20.9 295.90 followed by psychiatry 2. Essential hypertension I10 401.9 controlled. Continue current medications and he was counseled on low-salt diet. 3. Type 2 diabetes mellitus without complication, without long-term current use of insulin (HCC) E11.9 250.00 continue current medications and he was counseled on diabetic diet. MICROALBUMIN, UR, RAND W/ MICROALB/CREAT RATIO  
   HEMOGLOBIN A1C W/O EAG 4. Benign prostatic hyperplasia without lower urinary tract symptoms N40.0 600.00 followed by urology. 5. Pure hypercholesterolemia E78.00 272.0 counseled on low-fat diet. LIPID PANEL 6. Need for hepatitis C screening test Z11.59 V73.89 HEPATITIS C AB Orders Placed This Encounter  MICROALBUMIN, UR, RAND W/ MICROALB/CREAT RATIO Standing Status:   Future Standing Expiration Date:   1/4/2020  LIPID PANEL Standing Status:   Future Standing Expiration Date:   1/4/2020  
 HEMOGLOBIN A1C W/O EAG Standing Status:   Future Standing Expiration Date:   1/4/2020  HEPATITIS C AB Standing Status:   Future Standing Expiration Date:   1/10/2020 Additional Notes: Discussed related screening tests, preventive exams, importance of therapeutic lifestyle  changes ( diet/exercise/weight management) . After Visit Summary: Provided and discussed printed patient instructions. Questions answered accordingly. Follow-up Disposition: In 3 months with labs 1 week prior. Asmita Orlando DO, MPH Internal medicine Total time spent for today's visit was 40 minutes with greater than 50% of time spent involved in counseling and coordination of care as outlined above.

## 2019-01-09 NOTE — PROGRESS NOTES
Mr. Dipti Victoria, 70years old male, came to the office to establish care. After he got dismissed by his pcp, he found that he has growth in his chest, kidney and stomach that were discovered in Dec 2018 after going to a doctor for his chest pain. He denies having headaches, nausea, vomiting, diarrhea ROS: Monday he had painful bloody stool after constipation for 3 days, but not today; difficulty voiding bladder for 5 days without pain or discoloration; negative *ATTENTION:  This note has been created by a medical student for educational purposes only. Please do not refer to the content of this note for clinical decision-making, billing, or other purposes. Please see attending physicians note to obtain clinical information on this patient. *

## 2019-01-09 NOTE — PROGRESS NOTES
Tamy Garcia is a  70 y.o. male presents today for office visit for established care. 1. Have you been to the ER, urgent care clinic or hospitalized since your last visit? NO  
 
2. Have you seen or consulted any other health care providers outside of the [address] [place] [address] since your last visit (Include any pap smears or colon screening)? NO

## 2019-01-09 NOTE — PATIENT INSTRUCTIONS
DASH Diet: Care Instructions Your Care Instructions The DASH diet is an eating plan that can help lower your blood pressure. DASH stands for Dietary Approaches to Stop Hypertension. Hypertension is high blood pressure. The DASH diet focuses on eating foods that are high in calcium, potassium, and magnesium. These nutrients can lower blood pressure. The foods that are highest in these nutrients are fruits, vegetables, low-fat dairy products, nuts, seeds, and legumes. But taking calcium, potassium, and magnesium supplements instead of eating foods that are high in those nutrients does not have the same effect. The DASH diet also includes whole grains, fish, and poultry. The DASH diet is one of several lifestyle changes your doctor may recommend to lower your high blood pressure. Your doctor may also want you to decrease the amount of sodium in your diet. Lowering sodium while following the DASH diet can lower blood pressure even further than just the DASH diet alone. Follow-up care is a key part of your treatment and safety. Be sure to make and go to all appointments, and call your doctor if you are having problems. It's also a good idea to know your test results and keep a list of the medicines you take. How can you care for yourself at home? Following the DASH diet · Eat 4 to 5 servings of fruit each day. A serving is 1 medium-sized piece of fruit, ½ cup chopped or canned fruit, 1/4 cup dried fruit, or 4 ounces (½ cup) of fruit juice. Choose fruit more often than fruit juice. · Eat 4 to 5 servings of vegetables each day. A serving is 1 cup of lettuce or raw leafy vegetables, ½ cup of chopped or cooked vegetables, or 4 ounces (½ cup) of vegetable juice. Choose vegetables more often than vegetable juice. · Get 2 to 3 servings of low-fat and fat-free dairy each day. A serving is 8 ounces of milk, 1 cup of yogurt, or 1 ½ ounces of cheese. · Eat 6 to 8 servings of grains each day. A serving is 1 slice of bread, 1 ounce of dry cereal, or ½ cup of cooked rice, pasta, or cooked cereal. Try to choose whole-grain products as much as possible. · Limit lean meat, poultry, and fish to 2 servings each day. A serving is 3 ounces, about the size of a deck of cards. · Eat 4 to 5 servings of nuts, seeds, and legumes (cooked dried beans, lentils, and split peas) each week. A serving is 1/3 cup of nuts, 2 tablespoons of seeds, or ½ cup of cooked beans or peas. · Limit fats and oils to 2 to 3 servings each day. A serving is 1 teaspoon of vegetable oil or 2 tablespoons of salad dressing. · Limit sweets and added sugars to 5 servings or less a week. A serving is 1 tablespoon jelly or jam, ½ cup sorbet, or 1 cup of lemonade. · Eat less than 2,300 milligrams (mg) of sodium a day. If you limit your sodium to 1,500 mg a day, you can lower your blood pressure even more. Tips for success · Start small. Do not try to make dramatic changes to your diet all at once. You might feel that you are missing out on your favorite foods and then be more likely to not follow the plan. Make small changes, and stick with them. Once those changes become habit, add a few more changes. · Try some of the following: ? Make it a goal to eat a fruit or vegetable at every meal and at snacks. This will make it easy to get the recommended amount of fruits and vegetables each day. ? Try yogurt topped with fruit and nuts for a snack or healthy dessert. ? Add lettuce, tomato, cucumber, and onion to sandwiches. ? Combine a ready-made pizza crust with low-fat mozzarella cheese and lots of vegetable toppings. Try using tomatoes, squash, spinach, broccoli, carrots, cauliflower, and onions. ? Have a variety of cut-up vegetables with a low-fat dip as an appetizer instead of chips and dip. ? Sprinkle sunflower seeds or chopped almonds over salads.  Or try adding chopped walnuts or almonds to cooked vegetables. ? Try some vegetarian meals using beans and peas. Add garbanzo or kidney beans to salads. Make burritos and tacos with mashed cohen beans or black beans. Where can you learn more? Go to http://iron-janene.info/. Enter K793 in the search box to learn more about \"DASH Diet: Care Instructions. \" Current as of: December 6, 2017 Content Version: 11.8 © 2732-6797 [employer]. Care instructions adapted under license by [employer] (which disclaims liability or warranty for this information). If you have questions about a medical condition or this instruction, always ask your healthcare professional. [address] any warranty or liability for your use of this information. Learning About Diabetes Food Guidelines Your Care Instructions Meal planning is important to manage diabetes. It helps keep your blood sugar at a target level (which you set with your doctor). You don't have to eat special foods. You can eat what your family eats, including sweets once in a while. But you do have to pay attention to how often you eat and how much you eat of certain foods. You may want to work with a dietitian or a certified diabetes educator (CDE) to help you plan meals and snacks. A dietitian or CDE can also help you lose weight if that is one of your goals. What should you know about eating carbs? Managing the amount of carbohydrate (carbs) you eat is an important part of healthy meals when you have diabetes. Carbohydrate is found in many foods. · Learn which foods have carbs. And learn the amounts of carbs in different foods. ? Bread, cereal, pasta, and rice have about 15 grams of carbs in a serving. A serving is 1 slice of bread (1 ounce), ½ cup of cooked cereal, or 1/3 cup of cooked pasta or rice. ? Fruits have 15 grams of carbs in a serving.  A serving is 1 small fresh fruit, such as an apple or orange; ½ of a banana; ½ cup of cooked or canned fruit; ½ cup of fruit juice; 1 cup of melon or raspberries; or 2 tablespoons of dried fruit. ? Milk and no-sugar-added yogurt have 15 grams of carbs in a serving. A serving is 1 cup of milk or 2/3 cup of no-sugar-added yogurt. ? Starchy vegetables have 15 grams of carbs in a serving. A serving is ½ cup of mashed potatoes or sweet potato; 1 cup winter squash; ½ of a small baked potato; ½ cup of cooked beans; or ½ cup cooked corn or green peas. · Learn how much carbs to eat each day and at each meal. A dietitian or CDE can teach you how to keep track of the amount of carbs you eat. This is called carbohydrate counting. · If you are not sure how to count carbohydrate grams, use the Plate Method to plan meals. It is a good, quick way to make sure that you have a balanced meal. It also helps you spread carbs throughout the day. ? Divide your plate by types of foods. Put non-starchy vegetables on half the plate, meat or other protein food on one-quarter of the plate, and a grain or starchy vegetable in the final quarter of the plate. To this you can add a small piece of fruit and 1 cup of milk or yogurt, depending on how many carbs you are supposed to eat at a meal. 
· Try to eat about the same amount of carbs at each meal. Do not \"save up\" your daily allowance of carbs to eat at one meal. 
· Proteins have very little or no carbs per serving. Examples of proteins are beef, chicken, turkey, fish, eggs, tofu, cheese, cottage cheese, and peanut butter. A serving size of meat is 3 ounces, which is about the size of a deck of cards. Examples of meat substitute serving sizes (equal to 1 ounce of meat) are 1/4 cup of cottage cheese, 1 egg, 1 tablespoon of peanut butter, and ½ cup of tofu. How can you eat out and still eat healthy? · Learn to estimate the serving sizes of foods that have carbohydrate.  If you measure food at home, it will be easier to estimate the amount in a serving of restaurant food. · If the meal you order has too much carbohydrate (such as potatoes, corn, or baked beans), ask to have a low-carbohydrate food instead. Ask for a salad or green vegetables. · If you use insulin, check your blood sugar before and after eating out to help you plan how much to eat in the future. · If you eat more carbohydrate at a meal than you had planned, take a walk or do other exercise. This will help lower your blood sugar. What else should you know? · Limit saturated fat, such as the fat from meat and dairy products. This is a healthy choice because people who have diabetes are at higher risk of heart disease. So choose lean cuts of meat and nonfat or low-fat dairy products. Use olive or canola oil instead of butter or shortening when cooking. · Don't skip meals. Your blood sugar may drop too low if you skip meals and take insulin or certain medicines for diabetes. · Check with your doctor before you drink alcohol. Alcohol can cause your blood sugar to drop too low. Alcohol can also cause a bad reaction if you take certain diabetes medicines. Follow-up care is a key part of your treatment and safety. Be sure to make and go to all appointments, and call your doctor if you are having problems. It's also a good idea to know your test results and keep a list of the medicines you take. Where can you learn more? Go to http://iron-janene.info/. Enter X176 in the search box to learn more about \"Learning About Diabetes Food Guidelines. \" Current as of: December 7, 2017 Content Version: 11.8 © 4418-2603 [employer]. Care instructions adapted under license by [employer] (which disclaims liability or warranty for this information).  If you have questions about a medical condition or this instruction, always ask your healthcare professional. [employer] disclaims any warranty or liability for your use of this information. High Cholesterol: Care Instructions Your Care Instructions Cholesterol is a type of fat in your blood. It is needed for many body functions, such as making new cells. Cholesterol is made by your body. It also comes from food you eat. High cholesterol means that you have too much of the fat in your blood. This raises your risk of a heart attack and stroke. LDL and HDL are part of your total cholesterol. LDL is the \"bad\" cholesterol. High LDL can raise your risk for heart disease, heart attack, and stroke. HDL is the \"good\" cholesterol. It helps clear bad cholesterol from the body. High HDL is linked with a lower risk of heart disease, heart attack, and stroke. Your cholesterol levels help your doctor find out your risk for having a heart attack or stroke. You and your doctor can talk about whether you need to lower your risk and what treatment is best for you. A heart-healthy lifestyle along with medicines can help lower your cholesterol and your risk. The way you choose to lower your risk will depend on how high your risk is for heart attack and stroke. It will also depend on how you feel about taking medicines. Follow-up care is a key part of your treatment and safety. Be sure to make and go to all appointments, and call your doctor if you are having problems. It's also a good idea to know your test results and keep a list of the medicines you take. How can you care for yourself at home? · Eat a variety of foods every day. Good choices include fruits, vegetables, whole grains (like oatmeal), dried beans and peas, nuts and seeds, soy products (like tofu), and fat-free or low-fat dairy products. · Replace butter, margarine, and hydrogenated or partially hydrogenated oils with olive and canola oils. (Canola oil margarine without trans fat is fine.) · Replace red meat with fish, poultry, and soy protein (like tofu). · Limit processed and packaged foods like chips, crackers, and cookies. · Bake, broil, or steam foods. Don't calvo them. · Be physically active. Get at least 30 minutes of exercise on most days of the week. Walking is a good choice. You also may want to do other activities, such as running, swimming, cycling, or playing tennis or team sports. · Stay at a healthy weight or lose weight by making the changes in eating and physical activity listed above. Losing just a small amount of weight, even 5 to 10 pounds, can reduce your risk for having a heart attack or stroke. · Do not smoke. When should you call for help? Watch closely for changes in your health, and be sure to contact your doctor if: 
  · You need help making lifestyle changes.  
  · You have questions about your medicine. Where can you learn more? Go to http://iron-janene.info/. Enter M078 in the search box to learn more about \"High Cholesterol: Care Instructions. \" Current as of: December 6, 2017 Content Version: 11.8 © 5301-2248 [employer]. Care instructions adapted under license by [employer] (which disclaims liability or warranty for this information). If you have questions about a medical condition or this instruction, always ask your healthcare professional. [address] any warranty or liability for your use of this information.

## 2019-01-10 NOTE — TELEPHONE ENCOUNTER
Requested Prescriptions     Pending Prescriptions Disp Refills    glimepiride (AMARYL) 4 mg tablet       Si Tab.  dilTIAZem CD (CARDIZEM CD) 120 mg ER capsule 30 Cap      Sig: Take  by mouth daily.  atorvastatin (LIPITOR) 10 mg tablet 30 Tab      Sig: Take  by mouth daily.  lisinopril (PRINIVIL, ZESTRIL) 20 mg tablet       Si Tab.

## 2019-01-11 RX ORDER — ATORVASTATIN CALCIUM 10 MG/1
10 TABLET, FILM COATED ORAL DAILY
Qty: 90 TAB | Refills: 0 | Status: SHIPPED | OUTPATIENT
Start: 2019-01-11 | End: 2019-05-08

## 2019-01-11 RX ORDER — LISINOPRIL 20 MG/1
20 TABLET ORAL DAILY
Qty: 90 TAB | Refills: 0 | Status: SHIPPED | OUTPATIENT
Start: 2019-01-11 | End: 2019-05-08

## 2019-01-11 RX ORDER — GLIMEPIRIDE 4 MG/1
4 TABLET ORAL
Qty: 90 TAB | Refills: 0 | Status: SHIPPED | OUTPATIENT
Start: 2019-01-11 | End: 2020-04-01 | Stop reason: ALTCHOICE

## 2019-01-11 RX ORDER — DILTIAZEM HYDROCHLORIDE 120 MG/1
120 CAPSULE, COATED, EXTENDED RELEASE ORAL DAILY
Qty: 90 CAP | Refills: 0 | Status: SHIPPED | OUTPATIENT
Start: 2019-01-11 | End: 2019-05-08

## 2019-01-17 ENCOUNTER — HOSPITAL ENCOUNTER (OUTPATIENT)
Dept: PET IMAGING | Age: 64
Discharge: HOME | End: 2019-01-17
Attending: INTERNAL MEDICINE
Payer: MEDICARE

## 2019-01-17 DIAGNOSIS — C49.9 ENDOTHELIOSARCOMA (HCC): ICD-10-CM

## 2019-01-17 DIAGNOSIS — C64.9 ADENOCARCINOMA, RENAL CELL (HCC): ICD-10-CM

## 2019-01-17 PROCEDURE — A9552 F18 FDG: HCPCS

## 2019-02-08 ENCOUNTER — HOSPITAL ENCOUNTER (OUTPATIENT)
Dept: CT IMAGING | Age: 64
Discharge: HOME | End: 2019-02-08
Attending: RADIOLOGY | Admitting: RADIOLOGY
Payer: MEDICARE

## 2019-02-08 VITALS
WEIGHT: 140 LBS | HEIGHT: 70 IN | SYSTOLIC BLOOD PRESSURE: 116 MMHG | DIASTOLIC BLOOD PRESSURE: 70 MMHG | BODY MASS INDEX: 20.04 KG/M2 | HEART RATE: 67 BPM | OXYGEN SATURATION: 100 % | RESPIRATION RATE: 14 BRPM | TEMPERATURE: 97.4 F

## 2019-02-08 DIAGNOSIS — N28.89 KIDNEY MASS: ICD-10-CM

## 2019-02-08 DIAGNOSIS — C49.4 LIPOSARCOMA OF STOMACH (HCC): ICD-10-CM

## 2019-02-08 LAB
ANION GAP SERPL CALC-SCNC: 6 MMOL/L (ref 3–18)
APTT PPP: 28.7 SEC (ref 23–36.4)
BUN SERPL-MCNC: 23 MG/DL (ref 7–18)
BUN/CREAT SERPL: 20 (ref 12–20)
CALCIUM SERPL-MCNC: 9.5 MG/DL (ref 8.5–10.1)
CHLORIDE SERPL-SCNC: 104 MMOL/L (ref 100–108)
CO2 SERPL-SCNC: 29 MMOL/L (ref 21–32)
CREAT SERPL-MCNC: 1.14 MG/DL (ref 0.6–1.3)
ERYTHROCYTE [DISTWIDTH] IN BLOOD BY AUTOMATED COUNT: 12.6 % (ref 11.6–14.5)
GLUCOSE BLD STRIP.AUTO-MCNC: 148 MG/DL (ref 70–110)
GLUCOSE BLD STRIP.AUTO-MCNC: 150 MG/DL (ref 70–110)
GLUCOSE SERPL-MCNC: 158 MG/DL (ref 74–99)
HCT VFR BLD AUTO: 37.1 % (ref 36–48)
HGB BLD-MCNC: 12.5 G/DL (ref 13–16)
INR PPP: 1 (ref 0.8–1.2)
MCH RBC QN AUTO: 29.7 PG (ref 24–34)
MCHC RBC AUTO-ENTMCNC: 33.7 G/DL (ref 31–37)
MCV RBC AUTO: 88.1 FL (ref 74–97)
PLATELET # BLD AUTO: 216 K/UL (ref 135–420)
PMV BLD AUTO: 9.5 FL (ref 9.2–11.8)
POTASSIUM SERPL-SCNC: 3.9 MMOL/L (ref 3.5–5.5)
PROTHROMBIN TIME: 12.5 SEC (ref 11.5–15.2)
RBC # BLD AUTO: 4.21 M/UL (ref 4.7–5.5)
SODIUM SERPL-SCNC: 139 MMOL/L (ref 136–145)
WBC # BLD AUTO: 5.7 K/UL (ref 4.6–13.2)

## 2019-02-08 PROCEDURE — 50200 RENAL BIOPSY PERQ: CPT

## 2019-02-08 PROCEDURE — 99152 MOD SED SAME PHYS/QHP 5/>YRS: CPT

## 2019-02-08 PROCEDURE — 74011250636 HC RX REV CODE- 250/636: Performed by: RADIOLOGY

## 2019-02-08 PROCEDURE — 74011250636 HC RX REV CODE- 250/636: Performed by: INTERNAL MEDICINE

## 2019-02-08 PROCEDURE — 85730 THROMBOPLASTIN TIME PARTIAL: CPT

## 2019-02-08 PROCEDURE — 74011250636 HC RX REV CODE- 250/636

## 2019-02-08 PROCEDURE — 82962 GLUCOSE BLOOD TEST: CPT

## 2019-02-08 PROCEDURE — 88334 PATH CONSLTJ SURG CYTO XM EA: CPT

## 2019-02-08 PROCEDURE — 74011000272 HC RX REV CODE- 272: Performed by: RADIOLOGY

## 2019-02-08 PROCEDURE — 85027 COMPLETE CBC AUTOMATED: CPT

## 2019-02-08 PROCEDURE — 88305 TISSUE EXAM BY PATHOLOGIST: CPT

## 2019-02-08 PROCEDURE — 80048 BASIC METABOLIC PNL TOTAL CA: CPT

## 2019-02-08 PROCEDURE — 36415 COLL VENOUS BLD VENIPUNCTURE: CPT

## 2019-02-08 PROCEDURE — 85610 PROTHROMBIN TIME: CPT

## 2019-02-08 PROCEDURE — 88333 PATH CONSLTJ SURG CYTO XM 1: CPT

## 2019-02-08 RX ORDER — SODIUM CHLORIDE 9 MG/ML
20 INJECTION, SOLUTION INTRAVENOUS CONTINUOUS
Status: DISCONTINUED | OUTPATIENT
Start: 2019-02-08 | End: 2019-02-08 | Stop reason: HOSPADM

## 2019-02-08 RX ORDER — NALOXONE HYDROCHLORIDE 0.4 MG/ML
0.1 INJECTION, SOLUTION INTRAMUSCULAR; INTRAVENOUS; SUBCUTANEOUS
Status: DISCONTINUED | OUTPATIENT
Start: 2019-02-08 | End: 2019-02-08 | Stop reason: HOSPADM

## 2019-02-08 RX ORDER — MIDAZOLAM HYDROCHLORIDE 1 MG/ML
1 INJECTION, SOLUTION INTRAMUSCULAR; INTRAVENOUS
Status: DISCONTINUED | OUTPATIENT
Start: 2019-02-08 | End: 2019-02-08

## 2019-02-08 RX ORDER — LIDOCAINE HYDROCHLORIDE 10 MG/ML
INJECTION, SOLUTION EPIDURAL; INFILTRATION; INTRACAUDAL; PERINEURAL
Status: DISCONTINUED
Start: 2019-02-08 | End: 2019-02-08 | Stop reason: HOSPADM

## 2019-02-08 RX ORDER — FLUMAZENIL 0.1 MG/ML
0.2 INJECTION INTRAVENOUS
Status: DISCONTINUED | OUTPATIENT
Start: 2019-02-08 | End: 2019-02-08 | Stop reason: HOSPADM

## 2019-02-08 RX ORDER — MIDAZOLAM HYDROCHLORIDE 1 MG/ML
INJECTION, SOLUTION INTRAMUSCULAR; INTRAVENOUS
Status: COMPLETED
Start: 2019-02-08 | End: 2019-02-08

## 2019-02-08 RX ORDER — SODIUM CHLORIDE 0.9 % (FLUSH) 0.9 %
5-40 SYRINGE (ML) INJECTION AS NEEDED
Status: DISCONTINUED | OUTPATIENT
Start: 2019-02-08 | End: 2019-02-08 | Stop reason: HOSPADM

## 2019-02-08 RX ORDER — ONDANSETRON 2 MG/ML
4 INJECTION INTRAMUSCULAR; INTRAVENOUS
Status: DISCONTINUED | OUTPATIENT
Start: 2019-02-08 | End: 2019-02-08 | Stop reason: HOSPADM

## 2019-02-08 RX ORDER — FENTANYL CITRATE 50 UG/ML
50 INJECTION, SOLUTION INTRAMUSCULAR; INTRAVENOUS
Status: DISCONTINUED | OUTPATIENT
Start: 2019-02-08 | End: 2019-02-08

## 2019-02-08 RX ORDER — FENTANYL CITRATE 50 UG/ML
INJECTION, SOLUTION INTRAMUSCULAR; INTRAVENOUS
Status: COMPLETED
Start: 2019-02-08 | End: 2019-02-08

## 2019-02-08 RX ORDER — SODIUM CHLORIDE 0.9 % (FLUSH) 0.9 %
5-40 SYRINGE (ML) INJECTION EVERY 8 HOURS
Status: DISCONTINUED | OUTPATIENT
Start: 2019-02-08 | End: 2019-02-08 | Stop reason: HOSPADM

## 2019-02-08 RX ADMIN — FENTANYL CITRATE 50 MCG: 50 INJECTION INTRAMUSCULAR; INTRAVENOUS at 09:20

## 2019-02-08 RX ADMIN — MIDAZOLAM HYDROCHLORIDE 1 MG: 2 INJECTION, SOLUTION INTRAMUSCULAR; INTRAVENOUS at 09:20

## 2019-02-08 RX ADMIN — ONDANSETRON 4 MG: 2 INJECTION INTRAMUSCULAR; INTRAVENOUS at 11:05

## 2019-02-08 RX ADMIN — FENTANYL CITRATE 50 MCG: 50 INJECTION, SOLUTION INTRAMUSCULAR; INTRAVENOUS at 09:30

## 2019-02-08 RX ADMIN — MIDAZOLAM HYDROCHLORIDE 1 MG: 1 INJECTION, SOLUTION INTRAMUSCULAR; INTRAVENOUS at 09:30

## 2019-02-08 RX ADMIN — FENTANYL CITRATE 50 MCG: 50 INJECTION, SOLUTION INTRAMUSCULAR; INTRAVENOUS at 09:20

## 2019-02-08 RX ADMIN — Medication 1 APPLICATOR: at 09:37

## 2019-02-08 RX ADMIN — SODIUM CHLORIDE 20 ML/HR: 900 INJECTION, SOLUTION INTRAVENOUS at 07:13

## 2019-02-08 RX ADMIN — MIDAZOLAM HYDROCHLORIDE 1 MG: 1 INJECTION, SOLUTION INTRAMUSCULAR; INTRAVENOUS at 09:20

## 2019-02-08 RX ADMIN — MIDAZOLAM HYDROCHLORIDE 1 MG: 1 INJECTION, SOLUTION INTRAMUSCULAR; INTRAVENOUS at 09:25

## 2019-02-08 RX ADMIN — FENTANYL CITRATE 50 MCG: 50 INJECTION, SOLUTION INTRAMUSCULAR; INTRAVENOUS at 09:25

## 2019-02-08 NOTE — PROGRESS NOTES
TRANSFER - OUT REPORT:    Verbal report given to Gloria Duenas RN(name) on Hari Sanchez  being transferred to Phase 2(unit) for routine post - op       Report consisted of patients Situation, Background, Assessment and   Recommendations(SBAR). Information from the following report(s) SBAR, Kardex and MAR was reviewed with the receiving nurse. Lines:   Peripheral IV 02/08/19 Anterior; Left Forearm (Active)        Opportunity for questions and clarification was provided.       Patient transported with:   [facility]

## 2019-02-08 NOTE — H&P
OUTPATIENT HISTORY AND PHYSICAL      Today 2/8/2019     Indication/Symptoms:   Alisha Deal is a 70 y.o. male with a history of a right renal mass who presents for an image-guided renal mass biopsy with moderate sedation. Patient has been NPO since midnight and takes no blood thinning medications. Current Meds:    Prior to Admission medications    Medication Sig Start Date End Date Taking? Authorizing Provider   glimepiride (AMARYL) 4 mg tablet Take 1 Tab by mouth every morning. 1/11/19  Yes Enmanuel Muñoz DO   dilTIAZem CD (CARDIZEM CD) 120 mg ER capsule Take 1 Cap by mouth daily. 1/11/19  Yes Enmanuel Muñoz DO   atorvastatin (LIPITOR) 10 mg tablet Take 1 Tab by mouth daily. 1/11/19  Yes Enmanuel Muñoz,    lisinopril (PRINIVIL, ZESTRIL) 20 mg tablet Take 1 Tab by mouth daily. 1/11/19  Yes Enmanuel Muñoz DO   haloperidol (HALDOL) 10 mg tablet Take 10 mg by mouth. Yes Provider, Historical   acetaminophen (TYLENOL) 500 mg tablet Take  by mouth every six (6) hours as needed for Pain. Yes Provider, Historical   multivitamin (ONE A DAY) tablet Take 1 Tab by mouth daily. Yes Provider, Historical   loratadine 10 mg cap Take  by mouth. Yes Provider, Historical   benztropine (COGENTIN) 0.5 mg tablet Take 1 Tab by mouth two (2) times a day. 11/20/18  Yes Ridge Howard MD   melatonin 3 mg tablet Take 2 Tabs by mouth nightly. 11/20/18  Yes Christiana Colon MD   docusate sodium (COLACE) 100 mg capsule 100 mg. 10/28/13  Yes Provider, Historical   finasteride (PROSCAR) 5 mg tablet Take 1 Tab by mouth daily.  1/24/19   Jorge Laughlin MD   aspirin delayed-release 81 mg tablet 81 mg.    Provider, Historical       Allergies:    No Known Allergies    Comorbid Conditions:    Past Medical History:   Diagnosis Date    Blister of foot or toe     BPH (benign prostatic hyperplasia)     DM w/o complication type II (HCC)     Elevated prostate specific antigen (PSA)     Hypertension     Lower urinary tract symptoms (LUTS)     Nodular prostate without urinary obstruction     Unspecified disorder of penis           Past Surgical History:   Procedure Laterality Date    HX UROLOGICAL  7/11/2011     Negative Prostate Biopsy; Dr. Servando Farley, SUSPICIOUS RIGHT APEX FROM 03/2010     Data:    Visit Vitals  /90 (BP 1 Location: Right arm, BP Patient Position: At rest)   Pulse 82   Temp 97.6 °F (36.4 °C)   Resp 14   Ht 5' 9.5\" (1.765 m)   Wt 63.5 kg (140 lb)   SpO2 100%   BMI 20.38 kg/m²   :  Recent Labs     02/08/19  0700        Recent Labs     02/08/19  0700   INR 1.0   APTT 28.7       The H & P and/or progress notes and any available imaging were reviewed. The risks, indications and possible alternatives to the procedure, including doing nothing, were discussed and informed consent was obtained. Physical Exam:      Mental status:   Alert and oriented. Examination specific to the procedure proposed to be performed and any co morbid conditions:   Mallampati classification 2 ,  ASA 3   Heart:   Regular rate. Lungs:   Normal respiratory effort. Symmetrical rise and fall of chest    The patient is an appropriate candidate to undergo the planned procedure and sedation.     Osvaldo Vasquez, [id number]

## 2019-02-08 NOTE — DISCHARGE INSTRUCTIONS
Needle Kidney Biopsy: What to Expect at Home  Your Recovery    After a needle kidney biopsy, you will be told to lie down on your back for several hours. After this, you should avoid strenuous activity for the next 2 to 3 days. It's normal to feel some soreness in the area of the biopsy for 2 to 3 days. You may have a small amount of bleeding on the bandage after the biopsy. You may notice some blood in your urine after the biopsy. This should go away within 12 to 24 hours. If it doesn't, call your doctor. If you are feeling well, you should be able to get back to work within a week. But avoid strenuous activity, such as heavy lifting, for up to another week. This care sheet gives you a general idea about how long it will take for you to recover. But each person recovers at a different pace. Follow the steps below to feel better as quickly as possible. How can you care for yourself at home? Activity    · Avoid lifting anything that would make you strain. This may include heavy grocery bags and milk containers, a heavy briefcase or backpack, cat litter or dog food bags, a vacuum , or a child.     · Avoid strenuous activities, such as bicycle riding, jogging, weight lifting, or aerobic exercise, until your doctor says it is okay.     · Try to walk each day. Start by walking a little more than you did the day before. Bit by bit, increase the amount you walk. Walking boosts blood flow and helps prevent pneumonia and constipation.     · Rest when you feel tired. Getting enough sleep will help you recover.     · Ask your doctor when it is okay for you to have sex. Diet    · You can eat your normal diet. If your stomach is upset, try bland, low-fat foods like plain rice, broiled chicken, toast, and yogurt.     · Drink plenty of fluids to avoid becoming dehydrated. Medicines    · Your doctor will tell you if and when you can restart your medicines.  He or she will also give you instructions about taking any new medicines.     · If you take blood thinners, such as warfarin (Coumadin), clopidogrel (Plavix), or aspirin, be sure to talk to your doctor. He or she will tell you if and when to start taking those medicines again. Make sure that you understand exactly what your doctor wants you to do.     · Do not take aspirin or anti-inflammatory medicines for a week after the biopsy. Incision care    · Keep a bandage over the puncture site for the first 1 or 2 days. Follow-up care is a key part of your treatment and safety. Be sure to make and go to all appointments, and call your doctor if you are having problems. It's also a good idea to know your test results and keep a list of the medicines you take. When should you call for help? Call 911 anytime you think you may need emergency care. For example, call if:    · You passed out (lost consciousness).    Call your doctor now or seek immediate medical care if:    · You have signs of infection, such as:  ? Increased pain, swelling, warmth, or redness. ? Red streaks leading from the puncture site. ? Pus draining from the puncture site. ? A fever.     · Bright red blood has soaked through the bandage over the puncture site.     · You have new or worse pain at the puncture site.    Watch closely for any changes in your health, and call your doctor if:    · You have blood in your urine for more than 1 day. Where can you learn more? Go to http://iron-janene.info/. Enter S675 in the search box to learn more about \"Needle Kidney Biopsy: What to Expect at Home. \"  Current as of: March 14, 2018  Content Version: 11.9  © 6630-5341 [employer]. Care instructions adapted under license by [employer] (which disclaims liability or warranty for this information).  If you have questions about a medical condition or this instruction, always ask your healthcare professional. [place] [address] any warranty or liability for your use of this information. DISCHARGE SUMMARY from Nurse    PATIENT INSTRUCTIONS:    After general anesthesia or intravenous sedation, for 24 hours or while taking prescription Narcotics:  · Limit your activities  · Do not drive and operate hazardous machinery  · Do not make important personal or business decisions  · Do  not drink alcoholic beverages  · If you have not urinated within 8 hours after discharge, please contact your surgeon on call. Report the following to your surgeon:  · Excessive pain, swelling, redness or odor of or around the surgical area  · Temperature over 100.5  · Nausea and vomiting lasting longer than 4 hours or if unable to take medications  · Any signs of decreased circulation or nerve impairment to extremity: change in color, persistent  numbness, tingling, coldness or increase pain  · Any questions      *  Please give a list of your current medications to your Primary Care Provider. *  Please update this list whenever your medications are discontinued, doses are      changed, or new medications (including over-the-counter products) are added. *  Please carry medication information at all times in case of emergency situations. These are general instructions for a healthy lifestyle:    No smoking/ No tobacco products/ Avoid exposure to second hand smoke  Surgeon General's Warning:  Quitting smoking now greatly reduces serious risk to your health. Obesity, smoking, and sedentary lifestyle greatly increases your risk for illness    A healthy diet, regular physical exercise & weight monitoring are important for maintaining a healthy lifestyle    You may be retaining fluid if you have a history of heart failure or if you experience any of the following symptoms:  Weight gain of 3 pounds or more overnight or 5 pounds in a week, increased swelling in our hands or feet or shortness of breath while lying flat in bed.   Please call your doctor as soon as you notice any of these symptoms; do not wait until your next office visit. Recognize signs and symptoms of STROKE:    F-face looks uneven    A-arms unable to move or move unevenly    S-speech slurred or non-existent    T-time-call 911 as soon as signs and symptoms begin-DO NOT go       Back to bed or wait to see if you get better-TIME IS BRAIN. Warning Signs of HEART ATTACK     Call 911 if you have these symptoms:   Chest discomfort. Most heart attacks involve discomfort in the center of the chest that lasts more than a few minutes, or that goes away and comes back. It can feel like uncomfortable pressure, squeezing, fullness, or pain.  Discomfort in other areas of the upper body. Symptoms can include pain or discomfort in one or both arms, the back, neck, jaw, or stomach.  Shortness of breath with or without chest discomfort.  Other signs may include breaking out in a cold sweat, nausea, or lightheadedness. Don't wait more than five minutes to call 911 - MINUTES MATTER! Fast action can save your life. Calling 911 is almost always the fastest way to get lifesaving treatment. Emergency Medical Services staff can begin treatment when they arrive -- up to an hour sooner than if someone gets to the hospital by car. The discharge information has been reviewed with the patient and spouse. The patient and spouse verbalized understanding. Discharge medications reviewed with the patient and spouse and appropriate educational materials and side effects teaching were provided.   ___________________________________________________________________________________________________________________________________

## 2019-02-08 NOTE — PROCEDURES
RADIOLOGY POST PROCEDURE NOTE     February 8, 2019       9:55 AM     Preoperative Diagnosis:   Right renal mass. Postoperative Diagnosis:  Same. :  Dr. Pepper Brannon    Assistant:  None. Type of Anesthesia: 1% plain lidocaine and IV moderate sedation with Versed and Fentanyl. Procedure/Description:  Image guided right renal mass core needle Bx. Findings:   No bleeding. Estimated blood Loss:  Minimal    Specimen Removed:   no    Blood transfusions:  None. Implants:  None.     Complications: None    Condition: Stable    Discharge Plan:  discharge home     Jackeline Vega MD

## 2019-02-11 ENCOUNTER — PATIENT OUTREACH (OUTPATIENT)
Dept: FAMILY MEDICINE CLINIC | Age: 64
End: 2019-02-11

## 2019-02-11 NOTE — PROGRESS NOTES
Nurse Navigator (NN) attempted to contact patient via telephone call. There was no response. Voicemail messages were  left on listed home and mobile phone numbers  requesting a non-emergency return telephone call. Nurse Navigator contact information provided.

## 2019-02-12 ENCOUNTER — PATIENT OUTREACH (OUTPATIENT)
Dept: FAMILY MEDICINE CLINIC | Age: 64
End: 2019-02-12

## 2019-02-12 NOTE — PROGRESS NOTES
Hospital Discharge Follow-Up Date/Time:  2019 1:36 PM 
 
Patient was admitted to [facility] on 19 and discharged on 19  For: 
 
Image guided right renal mass core needle Bx. *Per Radiology  post procedure note of 19.  
  
 
The physician discharge summary was not available at the time of outreach. Patient was contacted within 2  business days of discharge. Top Challenges reviewed with the provider  
 no challenges noted at this time Method of communication with provider : 
- Chart routed to Dr. Lluvia Collins for review. Inpatient RRAT score: n/a Was this a readmission? no  
Patient stated reason for the readmission: n/a Nurse Navigator (NN) contacted the patient by telephone to perform post hospital discharge assessment. Verified name and  with patient as identifiers. Provided introduction to self, and explanation of the Nurse Navigator role. Reviewed discharge instructions and red flags with patient who verbalized understanding. Patient given an opportunity to ask questions and does not have any further questions or concerns at this time. The patient agrees to contact the Specialty provider/ PCP office for questions related to their healthcare. NN provided contact information for future reference. Nurse Navigator alerted patient that Dr. Ivone Moore, radiologist, phone number is available on his discharage paperwork should he have any concerns or questions. Patient can also contact Dr. Raza Severe urology or PCP for any concerns or questions. Patient was alerted to the availability of  PCP  or other advanced practioners after hours, weekends, and holidays. Please call the PCP office phone number and speak with the answering service for assistance. Nurse Navigator contact information provided for follow up as needed. Patient Denies:  
- fever  
- chills 
- redness, warmth swelling at procedure site Patient Reports: - He was a little tired at first but for the last 3-4 days not as tired. - He has all of his medications and does not have any questions regarding his medications. Disease Specific:   N/A Summary of patient's top problems: 1. Patient s/p needle biopsy 2.  
3.  
 
Home Health orders at discharge: 
- none noted at this time [address]: n/a Date of initial visit: n/a Durable Medical Equipment ordered/company: n/a Durable Medical Equipment received: n/a Barriers to care? None Advance Care Planning:  
Does patient have an Advance Directive:  not on file Medication(s):  
New Medications at Discharge: none Changed Medications at Discharge: none Discontinued Medications at Discharge: none *Per AVS no changes to medications. Medication reconciliation was offered and declined by  patient, who verbalizes understanding of administration of home medications. There were no barriers to obtaining medications identified at this time. Referral to Pharm D needed: no  
 
Current Outpatient Medications Medication Sig  
 finasteride (PROSCAR) 5 mg tablet Take 1 Tab by mouth daily.  glimepiride (AMARYL) 4 mg tablet Take 1 Tab by mouth every morning.  dilTIAZem CD (CARDIZEM CD) 120 mg ER capsule Take 1 Cap by mouth daily.  atorvastatin (LIPITOR) 10 mg tablet Take 1 Tab by mouth daily.  lisinopril (PRINIVIL, ZESTRIL) 20 mg tablet Take 1 Tab by mouth daily.  haloperidol (HALDOL) 10 mg tablet Take 10 mg by mouth.  acetaminophen (TYLENOL) 500 mg tablet Take  by mouth every six (6) hours as needed for Pain.  multivitamin (ONE A DAY) tablet Take 1 Tab by mouth daily.  loratadine 10 mg cap Take  by mouth.  benztropine (COGENTIN) 0.5 mg tablet Take 1 Tab by mouth two (2) times a day.  melatonin 3 mg tablet Take 2 Tabs by mouth nightly.  aspirin delayed-release 81 mg tablet 81 mg.  
 docusate sodium (COLACE) 100 mg capsule 100 mg. No current facility-administered medications for this visit. There are no discontinued medications. [facility] follow up appointment(s):  
Future Appointments Date Time Provider Malik Tamayo 2/27/2019 10:00 AM MD Morgan Carson  
4/9/2019  2:00 PM [facility] NURSE Morgan  
5/9/2019 11:15 AM MD Morgan Martinez Patient encouraged to follow up with Dr. Lindsey Mckenzie within 1 week. Patient states that he is in the process of finding a new general practioner. Non-[facility] follow up appointment(s):  
Dr. Nessa Whipple Urology 2/27/19 [facility]:  out of service area Goals  Attends follow-up appointments as directed. [employer] Date:  3/8/19  
 
2/12/2019 Patient to follow up with Dr. Nessa Whipple on 2/27/19 (urology) Patient to call for appointment to follow up with Dr. Lindsey Mckenzie in 1 week Patient states he is in the process of finding another general practice physician  Prevent complications post hospitalization. [employer] Date:  3/8/19 
 
2/12/2019 Patient and/or family members were alerted to the availability of physician or other advanced practioners after hours, weekends, and holidays. Please call the PCP office phone number and speak with the answering service for assistance. Nurse Navigator contact information provided for follow up as needed. Nurse Navigator reviewed discharge recommendations with patient as follows: - Avoid strenous activity such as jogging or aerobics. - Avoid heavy lifting such as a child, vacuum  - Walk a little each day - Keep a bandage over the puncture site for the first 1-2 days. Patient referred to discharge instructions and to please follow up with physician re: when it is ok to resume prior activity level regarding lifting heavy objects and strenous  activities. Patient does not have any questions at this time.

## 2019-02-27 ENCOUNTER — HOSPITAL ENCOUNTER (OUTPATIENT)
Dept: LAB | Age: 64
Discharge: HOME | End: 2019-02-27
Payer: MEDICARE

## 2019-02-27 ENCOUNTER — PATIENT OUTREACH (OUTPATIENT)
Dept: FAMILY MEDICINE CLINIC | Age: 64
End: 2019-02-27

## 2019-02-27 DIAGNOSIS — C49.4 LIPOSARCOMA OF STOMACH (HCC): ICD-10-CM

## 2019-02-27 PROCEDURE — 93005 ELECTROCARDIOGRAM TRACING: CPT

## 2019-02-28 LAB
ATRIAL RATE: 78 BPM
CALCULATED P AXIS, ECG09: 76 DEGREES
CALCULATED R AXIS, ECG10: 79 DEGREES
CALCULATED T AXIS, ECG11: 63 DEGREES
DIAGNOSIS, 93000: NORMAL
P-R INTERVAL, ECG05: 152 MS
Q-T INTERVAL, ECG07: 386 MS
QRS DURATION, ECG06: 80 MS
QTC CALCULATION (BEZET), ECG08: 440 MS
VENTRICULAR RATE, ECG03: 78 BPM

## 2019-03-08 ENCOUNTER — PATIENT OUTREACH (OUTPATIENT)
Dept: FAMILY MEDICINE CLINIC | Age: 64
End: 2019-03-08

## 2019-03-18 ENCOUNTER — HOSPITAL ENCOUNTER (OUTPATIENT)
Dept: CT IMAGING | Age: 64
Discharge: HOME | End: 2019-03-18
Attending: SURGERY
Payer: MEDICARE

## 2019-03-18 DIAGNOSIS — C49.A0 GASTROINTESTINAL STROMAL TUMOR (HCC): ICD-10-CM

## 2019-03-18 LAB — CREAT UR-MCNC: 1.3 MG/DL (ref 0.6–1.3)

## 2019-03-18 PROCEDURE — 82565 ASSAY OF CREATININE: CPT

## 2019-03-18 PROCEDURE — 74011636320 HC RX REV CODE- 636/320

## 2019-03-18 PROCEDURE — 74177 CT ABD & PELVIS W/CONTRAST: CPT

## 2019-03-18 RX ADMIN — IOPAMIDOL 100 ML: 612 INJECTION, SOLUTION INTRAVENOUS at 13:26

## 2019-05-24 PROBLEM — C64.1 RENAL CELL CARCINOMA OF RIGHT KIDNEY (HCC): Status: ACTIVE | Noted: 2019-05-10

## 2019-05-24 PROBLEM — J30.1 HAY FEVER: Status: ACTIVE | Noted: 2019-05-24

## 2019-08-14 ENCOUNTER — HOSPITAL ENCOUNTER (OUTPATIENT)
Dept: CT IMAGING | Age: 64
Discharge: HOME | End: 2019-08-14
Attending: UROLOGY
Payer: MEDICARE

## 2019-08-14 DIAGNOSIS — C64.1 CARCINOMA, RENAL CELL, RIGHT (HCC): ICD-10-CM

## 2019-08-14 LAB — CREAT UR-MCNC: 1.6 MG/DL (ref 0.6–1.3)

## 2019-08-14 PROCEDURE — 82565 ASSAY OF CREATININE: CPT

## 2019-08-14 PROCEDURE — 74011636320 HC RX REV CODE- 636/320: Performed by: UROLOGY

## 2019-08-14 PROCEDURE — 74177 CT ABD & PELVIS W/CONTRAST: CPT

## 2019-08-14 RX ADMIN — IOPAMIDOL 80 ML: 612 INJECTION, SOLUTION INTRAVENOUS at 15:56

## 2019-08-14 RX ADMIN — DIATRIZOATE MEGLUMINE AND DIATRIZOATE SODIUM 30 ML: 660; 100 LIQUID ORAL; RECTAL at 13:30

## 2020-01-27 NOTE — PROGRESS NOTES
Per Chart Review PCP has changed to Dr. Kirby Class Patient appears to have attended urology follow up appointment with Dr. Giselle Sanchez per chart review. Nurse Navigator will close case after 30 days post hospital discharge. Influenza

## 2021-06-04 NOTE — PROGRESS NOTES
[address] Inpatient Progress Note Date of Service: 11/15/18 Hospital Day: 8 Subjective/Interval History 11/15/18 Treatment Team Notes:  Notes reviewed and/or discussed and report that Quinton Thomas is a 71 y/o man admitted for exacerbation of Schizophrenia. Per nursing report, pt has been withdrawn to his room and has not been attending groups. Nurse observed him responding to internal stimuli today when he came out of his room for medications. Two nights ago, he refused to take oral Haldol and eventually took it right before nurse was about to give him injection. Pt seen for assessment. Pt denies complaints. States he slept well and has been eating well. He continues to endorse auditory hallucinations. Today he says there has been no change in intensity or frequency of the hallucinations and they are as prior to admission. He denies SI. 
 
Objective Visit Vitals BP (!) 140/94 (BP 1 Location: Right arm, BP Patient Position: Sitting) Pulse 100 Temp 96.5 °F (35.8 °C) Resp 17 Ht 5' 9\" (1.753 m) Wt 58.1 kg (128 lb) SpO2 100% BMI 18.90 kg/m² Recent Results (from the past 24 hour(s)) GLUCOSE, POC Collection Time: 11/15/18  6:54 AM  
Result Value Ref Range Glucose (POC) 207 (H) 70 - 110 mg/dL Mental Status Examination Appearance/Hygiene 70 y.o. BLACK OR  male Hygiene: [address] Behavior/Social Relatedness Appropriate Musculoskeletal Gait/Station: appropriate Tone (flaccid, cogwheeling, spastic): normal 
Psychomotor (hyperkinetic, hypokinetic): calm Involuntary movements (tics, dyskinesias, akathisa, stereotypies): none Speech   Rate, rhythm, volume, fluency and articulation are appropriate Mood   euthymic Affect    congruent Thought Process Linear and goal directed Thought Content and Perceptual Disturbances Denies self-injurious behavior Mohs Case Number:  (SIB), suicidal ideation (SI), aggressive behavior or homicidal ideation (HI) Endorsess auditory  hallucinations Sensorium and Cognition  Grossly intact Insight  limited Judgment fair Assessment/Plan Psychiatric Diagnoses:  
Patient Active Problem List  
Diagnosis Code  Blister of foot or toe [id number]  DM w/o complication type II ([facility] [address] E11.9  Hypertension I10  
 Penis pain N48.89  
 BPH (benign prostatic hyperplasia) N40.0  Lower urinary tract symptoms (LUTS) R39.9  Elevated prostate specific antigen (PSA) R97.20  Abnormal prostate exam R39.89  
 Schizophrenia ([facility] F20.9 Level of impairment/disability: Severe Carles Roussel is a 70 y.o. who is currently admitted for exacerbation of psychosis. 1.  Increase Haldol to 10mg bid. According to wife, he was prescribed 15mg qhs in the outpatient setting. Add Cogentin 0.5mg bid for EPS prevention. Pt does not have any cogwheeling or akathisia at this time. 2.  Reviewed instructions, risks, benefits and side effects of medications 3. Disposition/Discharge Date: self-care/home, to be determined. Dalia Rankin MD 
THE [facility] Psychiatry Date Of Previous Biopsy (Optional): 4/28/2021 Previous Accession (Optional): [id number] Biopsy Photograph Reviewed: Yes Referring Physician (Optional): Flory Spangler MD Consent Type: Consent 1 (Standard) Eye Shield Used: No Surgeon Performing Repair (Optional): Mercy Styles MD Initial Size Of Lesion: 0.6 X Size Of Lesion In Cm (Optional): 0.4 Number Of Stages: 1 Primary Defect Length In Cm (Final Defect Size - Required For Flaps/Grafts): 1.6 Primary Defect Width In Cm (Final Defect Size - Required For Flaps/Grafts): 1.2 Repair Type: Intermediate Layered Repair Oculoplastic Surgeon Procedure Text (A): After obtaining clear surgical margins the patient was sent to oculoplastics for surgical repair.  The patient understands they will receive post-surgical care and follow-up from the referring physician's office. Oculoplastic Surgeon Procedure Text (B): After obtaining clear surgical margins the patient was sent to oculoplastics for surgical repair.  The patient understands they will receive post-surgical care and follow-up from the referring physician's office. Otolaryngologist Procedure Text (A): After obtaining clear surgical margins the patient was sent to otolaryngology for surgical repair.  The patient understands they will receive post-surgical care and follow-up from the referring physician's office. Otolaryngologist Procedure Text (B): After obtaining clear surgical margins the patient was sent to otolaryngology for surgical repair.  The patient understands they will receive post-surgical care and follow-up from the referring physician's office. Plastic Surgeon Procedure Text (A): After obtaining clear surgical margins the patient was sent to plastics for surgical repair.  The patient understands they will receive post-surgical care and follow-up from the referring physician's office. Plastic Surgeon Procedure Text (B): After obtaining clear surgical margins the patient was sent to plastics for surgical repair.  The patient understands they will receive post-surgical care and follow-up from the referring physician's office. Mid-Level Procedure Text (A): After obtaining clear surgical margins the patient was sent to a mid-level provider for surgical repair.  The patient understands they will receive post-surgical care and follow-up from the mid-level provider. Mid-Level Procedure Text (B): After obtaining clear surgical margins the patient was sent to a mid-level provider for surgical repair.  The patient understands they will receive post-surgical care and follow-up from the mid-level provider. Provider Procedure Text (A): After obtaining clear surgical margins the defect was repaired by another provider. Asc Procedure Text (A): After obtaining clear surgical margins the patient was sent to an ASC for surgical repair.  The patient understands they will receive post-surgical care and follow-up from the ASC physician. Asc Procedure Text (B): After obtaining clear surgical margins the patient was sent to an ASC for surgical repair.  The patient understands they will receive post-surgical care and follow-up from the ASC physician. Suturegard Retention Suture: 2-0 Nylon Retention Suture Bite Size: 3 mm Length To Time In Minutes Device Was In Place: 10 Simple / Intermediate / Complex Repair - Final Wound Length In Cm: 4.3 Undermining Type: Entire Wound Debridement Text: The wound edges were debrided prior to proceeding with the closure to facilitate wound healing. Helical Rim Text: The closure involved the helical rim. Vermilion Border Text: The closure involved the vermilion border. Nostril Rim Text: The closure involved the nostril rim. Retention Suture Text: Retention sutures were placed to support the closure and prevent dehiscence. Secondary Defect Length In Cm (Required For Flaps): 0 Location Indication Override (Is Already Calculated Based On Selected Body Location): Area M Area H Indication Text: Tumors in this location are included in Area H (eyelids, eyebrows, nose, lips, chin, ear, pre-auricular, post-auricular, temple, genitalia, hands, feet, ankles and areola).  Tissue conservation is critical in these anatomic locations. Area M Indication Text: Tumors in this location are included in Area M (cheek, forehead, scalp, neck, jawline and pretibial skin).  Mohs surgery is indicated for tumors in these anatomic locations. Area L Indication Text: Tumors in this location are included in Area L (trunk and extremities).  Mohs surgery is indicated for larger tumors, or tumors with aggressive histologic features, in these anatomic locations. Tumor Debulked?: scalpel Special Stains Stage 1 - Results: Base On Clearance Noted Above Stage 2: Additional Anesthesia Type: 2% lidocaine with epinephrine Staging Info: By selecting yes to the question above you will include information on AJCC 8 tumor staging in your Mohs note. Information on tumor staging will be automatically added for SCCs on the head and neck. AJCC 8 includes tumor size, tumor depth, perineural involvement and bone invasion. Tumor Depth: Less than 6mm from granular layer and no invasion beyond the subcutaneous fat Was The Patient On Physician Recommended Anticoagulation Therapy?: Please Select the Appropriate Response Medical Necessity Statement: Based on my medical judgement, Mohs surgery is the most appropriate treatment for this cancer compared to excision or other treatments. Alternatives Discussed Intro (Do Not Add Period): I discussed alternative treatments to Mohs surgery and specifically discussed the risks and benefits of Consent 1/Introductory Paragraph: The rationale for Mohs was explained to the patient and consent was obtained. The risks, benefits and alternatives to therapy were discussed in detail. Specifically, the risks of infection, scarring, bleeding, prolonged wound healing, incomplete removal, allergy to anesthesia, nerve injury and recurrence were addressed. Prior to the procedure, the treatment site was clearly identified and confirmed by the patient. All components of Universal Protocol/PAUSE Rule completed. Consent 2/Introductory Paragraph: Mohs surgery was explained to the patient and consent was obtained. The risks, benefits and alternatives to therapy were discussed in detail. Specifically, the risks of infection, scarring, bleeding, prolonged wound healing, incomplete removal, allergy to anesthesia, nerve injury and recurrence were addressed. Prior to the procedure, the treatment site was clearly identified and confirmed by the patient. All components of Universal Protocol/PAUSE Rule completed. Consent 3/Introductory Paragraph: I gave the patient a chance to ask questions they had about the procedure.  Following this I explained the Mohs procedure and consent was obtained. The risks, benefits and alternatives to therapy were discussed in detail. Specifically, the risks of infection, scarring, bleeding, prolonged wound healing, incomplete removal, allergy to anesthesia, nerve injury and recurrence were addressed. Prior to the procedure, the treatment site was clearly identified and confirmed by the patient. All components of Universal Protocol/PAUSE Rule completed. Consent (Temporal Branch)/Introductory Paragraph: The rationale for Mohs was explained to the patient and consent was obtained. The risks, benefits and alternatives to therapy were discussed in detail. Specifically, the risks of damage to the temporal branch of the facial nerve, infection, scarring, bleeding, prolonged wound healing, incomplete removal, allergy to anesthesia, and recurrence were addressed. Prior to the procedure, the treatment site was clearly identified and confirmed by the patient. All components of Universal Protocol/PAUSE Rule completed. Consent (Marginal Mandibular)/Introductory Paragraph: The rationale for Mohs was explained to the patient and consent was obtained. The risks, benefits and alternatives to therapy were discussed in detail. Specifically, the risks of damage to the marginal mandibular branch of the facial nerve, infection, scarring, bleeding, prolonged wound healing, incomplete removal, allergy to anesthesia, and recurrence were addressed. Prior to the procedure, the treatment site was clearly identified and confirmed by the patient. All components of Universal Protocol/PAUSE Rule completed. Consent (Spinal Accessory)/Introductory Paragraph: The rationale for Mohs was explained to the patient and consent was obtained. The risks, benefits and alternatives to therapy were discussed in detail. Specifically, the risks of damage to the spinal accessory nerve, infection, scarring, bleeding, prolonged wound healing, incomplete removal, allergy to anesthesia, and recurrence were addressed. Prior to the procedure, the treatment site was clearly identified and confirmed by the patient. All components of Universal Protocol/PAUSE Rule completed. Consent (Near Eyelid Margin)/Introductory Paragraph: The rationale for Mohs was explained to the patient and consent was obtained. The risks, benefits and alternatives to therapy were discussed in detail. Specifically, the risks of ectropion or eyelid deformity, infection, scarring, bleeding, prolonged wound healing, incomplete removal, allergy to anesthesia, nerve injury and recurrence were addressed. Prior to the procedure, the treatment site was clearly identified and confirmed by the patient. All components of Universal Protocol/PAUSE Rule completed. Consent (Ear)/Introductory Paragraph: The rationale for Mohs was explained to the patient and consent was obtained. The risks, benefits and alternatives to therapy were discussed in detail. Specifically, the risks of ear deformity, infection, scarring, bleeding, prolonged wound healing, incomplete removal, allergy to anesthesia, nerve injury and recurrence were addressed. Prior to the procedure, the treatment site was clearly identified and confirmed by the patient. All components of Universal Protocol/PAUSE Rule completed. Consent (Nose)/Introductory Paragraph: The rationale for Mohs was explained to the patient and consent was obtained. The risks, benefits and alternatives to therapy were discussed in detail. Specifically, the risks of nasal deformity, changes in the flow of air through the nose, infection, scarring, bleeding, prolonged wound healing, incomplete removal, allergy to anesthesia, nerve injury and recurrence were addressed. Prior to the procedure, the treatment site was clearly identified and confirmed by the patient. All components of Universal Protocol/PAUSE Rule completed. Consent (Lip)/Introductory Paragraph: The rationale for Mohs was explained to the patient and consent was obtained. The risks, benefits and alternatives to therapy were discussed in detail. Specifically, the risks of lip deformity, changes in the oral aperture, infection, scarring, bleeding, prolonged wound healing, incomplete removal, allergy to anesthesia, nerve injury and recurrence were addressed. Prior to the procedure, the treatment site was clearly identified and confirmed by the patient. All components of Universal Protocol/PAUSE Rule completed. Consent (Scalp)/Introductory Paragraph: The rationale for Mohs was explained to the patient and consent was obtained. The risks, benefits and alternatives to therapy were discussed in detail. Specifically, the risks of changes in hair growth pattern secondary to repair, infection, scarring, bleeding, prolonged wound healing, incomplete removal, allergy to anesthesia, nerve injury and recurrence were addressed. Prior to the procedure, the treatment site was clearly identified and confirmed by the patient. All components of Universal Protocol/PAUSE Rule completed. Detail Level: Detailed Postop Diagnosis: same Surgeon: Mercy Styles MD Additional Anesthesia Volume In Cc: 0.5 Hemostasis: Electrocoagulation Estimated Blood Loss (Cc): minimal Stage 5: Additional Anesthesia Type: 1% lidocaine with epinephrine Repair Anesthesia Method: local infiltration Anesthesia Volume In Cc: 1.5 Brow Lift Text: A midfrontal incision was made medially to the defect to allow access to the tissues just superior to the left eyebrow. Following careful dissection inferiorly in a supraperiosteal plane to the level of the left eyebrow, several 3-0 monocryl sutures were used to resuspend the eyebrow orbicularis oculi muscular unit to the superior frontal bone periosteum. This resulted in an appropriate reapproximation of static eyebrow symmetry and correction of the left brow ptosis. Deep Sutures: 5-0 Vicryl Epidermal Sutures: 5-0 Prolene Epidermal Closure: running horizontal mattress Additional Epidermal Closure (Optional): running Suturegard Intro: Intraoperative tissue expansion was performed, utilizing the SUTUREGARD device, in order to reduce wound tension. Suturegard Body: The suture ends were repeatedly re-tightened and re-clamped to achieve the desired tissue expansion. Hemigard Intro: Due to skin fragility and wound tension, it was decided to use HEMIGARD adhesive retention suture devices to permit a linear closure. The skin was cleaned and dried for a 6cm distance away from the wound. Excessive hair, if present, was removed to allow for adhesion. Hemigard Postcare Instructions: The HEMIGARD strips are to remain completely dry for at least 5-7 days. Donor Site Anesthesia Type: same as repair anesthesia Graft Donor Site Dermal Sutures (Optional): 4-0 Vicryl Closure 2 Information: This tab is for additional flaps and grafts, including complex repair and grafts and complex repair and flaps. You can also specify a different location for the additional defect, if the location is the same you do not need to select a new one. We will insert the automated text for the repair you select below just as we do for solitary flaps and grafts. Please note that at this time if you select a location with a different insurance zone you will need to override the ICD10 and CPT if appropriate. Closure 3 Information: This tab is for additional flaps and grafts above and beyond our usual structured repairs.  Please note if you enter information here it will not currently bill and you will need to add the billing information manually. Closure 4 Information: This tab is for additional flaps and grafts above and beyond our usual structured repairs.  Please note if you enter information here it will not currently bill and you will need to add the billing information manually. Wound Care: Petrolatum Dressing: pressure dressing with telfa Dressing (No Sutures): dry sterile dressing Suture Removal: 7 days Unna Boot Text: An Unna boot was placed to help immobilize the limb and facilitate more rapid healing. Home Suture Removal Text: Patient was provided instructions on removing sutures and will remove their sutures at home.  If they have any questions or difficulties they will call the office. Post-Care Instructions: I reviewed with the patient in detail post-care instructions including a written handout and contact instructions if any concerns. Pain Refusal Text: I offered to prescribe pain medication but the patient refused to take this medication. Mauc Instructions: By selecting yes to the question below the MAUC number will be added into the note.  This will be calculated automatically based on the diagnosis chosen, the size entered, the body zone selected (H,M,L) and the specific indications you chose. You will also have the option to override the Mohs AUC if you disagree with the automatically calculated number and this option is found in the Case Summary tab. Where Do You Want The Question To Include Opioid Counseling Located?: Case Summary Tab Eye Protection Verbiage: Before proceeding with the stage, a plastic scleral shield was inserted. The globe was anesthetized with a few drops of 1% lidocaine with 1:100,000 epinephrine. Then, an appropriate sized scleral shield was chosen and coated with lacrilube ointment. The shield was gently inserted and left in place for the duration of each stage. After the stage was completed, the shield was gently removed. Mohs Method Verbiage: An incision using a #15 blade following the standard Mohs approach was done and the specimen was harvested as a microscopic controlled layer. Surgeon/Pathologist Verbiage (Will Incorporate Name Of Surgeon From Intro If Not Blank): operated in two distinct and integrated capacities as the surgeon and pathologist. Mohs Histo Method Verbiage: Each section was then chromacoded and processed in the Mohs lab using the Mohs protocol and submitted for frozen section. Subsequent Stages Histo Method Verbiage: Using a similar technique to that described above, a thin layer of tissue was removed from all areas where tumor was visible on the previous stage.  The tissue was again oriented, mapped, dyed, and processed as above. Mohs Rapid Report Verbiage: The area of clinically evident tumor was marked with skin marking ink and appropriately hatched.  The initial incision was made following the Mohs approach through the skin.  The specimen was taken to the lab, divided into the necessary number of pieces, chromacoded and processed according to the Mohs protocol.  This was repeated in successive stages until a tumor free defect was achieved. Complex Repair Preamble Text (Leave Blank If You Do Not Want): Extensive wide undermining was performed. Intermediate Repair Preamble Text (Leave Blank If You Do Not Want): Undermining was performed with blunt dissection. Non-Graft Cartilage Fenestration Text: The cartilage was fenestrated with a 2mm punch biopsy to help facilitate healing. Graft Cartilage Fenestration Text: The cartilage was fenestrated with a 2mm punch biopsy to help facilitate graft survival and healing. Secondary Intention Text (Leave Blank If You Do Not Want): The defect will heal with secondary intention. No Repair - Repaired With Adjacent Surgical Defect Text (Leave Blank If You Do Not Want): After obtaining clear surgical margins the defect was repaired concurrently with another surgical defect which was in close approximation. Referred To Oculoplastics For Closure Text (Leave Blank If You Do Not Want): After obtaining clear surgical margins the patient was sent to oculoplastics for surgical repair.  The patient understands they will receive post-surgical care and follow-up from the reconstructive physician's office. Referred To Otolaryngology For Closure Text (Leave Blank If You Do Not Want): After obtaining clear surgical margins the patient was sent to otolaryngology for surgical repair.  The patient understands they will receive post-surgical care and follow-up from the reconstructive physician's office. Referred To Plastics For Closure Text (Leave Blank If You Do Not Want): After obtaining clear surgical margins the patient was sent to plastics for surgical repair.  The patient understands they will receive post-surgical care and follow-up from the reconstructive physician's office. Advancement Flap (Single) Text: The defect edges were debeveled with a #15 scalpel blade.  Given the location of the defect and the proximity to free margins a single advancement flap was deemed most appropriate.  Using a sterile surgical marker, an appropriate advancement flap was drawn incorporating the defect and placing the expected incisions within the relaxed skin tension lines where possible.    The area thus outlined was incised deep to adipose tissue with a #15 scalpel blade.  The skin margins were undermined to an appropriate distance in all directions utilizing iris scissors. Advancement Flap (Double) Text: The defect edges were debeveled with a #15 scalpel blade.  Given the location of the defect and the proximity to free margins a double advancement flap was deemed most appropriate.  Using a sterile surgical marker, the appropriate advancement flaps were drawn incorporating the defect and placing the expected incisions within the relaxed skin tension lines where possible.    The area thus outlined was incised deep to adipose tissue with a #15 scalpel blade.  The skin margins were undermined to an appropriate distance in all directions utilizing iris scissors. Burow's Advancement Flap Text: The defect edges were debeveled with a #15 scalpel blade.  Given the location of the defect and the proximity to free margins a Burow's advancement flap was deemed most appropriate.  Using a sterile surgical marker, the appropriate advancement flap was drawn incorporating the defect and placing the expected incisions within the relaxed skin tension lines where possible.    The area thus outlined was incised deep to adipose tissue with a #15 scalpel blade.  The skin margins were undermined to an appropriate distance in all directions utilizing iris scissors. Chonodrocutaneous Helical Advancement Flap Text: The defect edges were debeveled with a #15 scalpel blade.  Given the location of the defect and the proximity to free margins a chondrocutaneous helical advancement flap was deemed most appropriate.  Using a sterile surgical marker, the appropriate advancement flap was drawn incorporating the defect and placing the expected incisions within the relaxed skin tension lines where possible.    The area thus outlined was incised deep to adipose tissue with a #15 scalpel blade.  The skin margins were undermined to an appropriate distance in all directions utilizing iris scissors. Crescentic Advancement Flap Text: The defect edges were debeveled with a #15 scalpel blade.  Given the location of the defect and the proximity to free margins a crescentic advancement flap was deemed most appropriate.  Using a sterile surgical marker, the appropriate advancement flap was drawn incorporating the defect and placing the expected incisions within the relaxed skin tension lines where possible.    The area thus outlined was incised deep to adipose tissue with a #15 scalpel blade.  The skin margins were undermined to an appropriate distance in all directions utilizing iris scissors. A-T Advancement Flap Text: The defect edges were debeveled with a #15 scalpel blade.  Given the location of the defect, shape of the defect and the proximity to free margins an A-T advancement flap was deemed most appropriate.  Using a sterile surgical marker, an appropriate advancement flap was drawn incorporating the defect and placing the expected incisions within the relaxed skin tension lines where possible.    The area thus outlined was incised deep to adipose tissue with a #15 scalpel blade.  The skin margins were undermined to an appropriate distance in all directions utilizing iris scissors. O-T Advancement Flap Text: The defect edges were debeveled with a #15 scalpel blade.  Given the location of the defect, shape of the defect and the proximity to free margins an O-T advancement flap was deemed most appropriate.  Using a sterile surgical marker, an appropriate advancement flap was drawn incorporating the defect and placing the expected incisions within the relaxed skin tension lines where possible.    The area thus outlined was incised deep to adipose tissue with a #15 scalpel blade.  The skin margins were undermined to an appropriate distance in all directions utilizing iris scissors. O-L Flap Text: The defect edges were debeveled with a #15 scalpel blade.  Given the location of the defect, shape of the defect and the proximity to free margins an O-L flap was deemed most appropriate.  Using a sterile surgical marker, an appropriate advancement flap was drawn incorporating the defect and placing the expected incisions within the relaxed skin tension lines where possible.    The area thus outlined was incised deep to adipose tissue with a #15 scalpel blade.  The skin margins were undermined to an appropriate distance in all directions utilizing iris scissors. O-Z Flap Text: The defect edges were debeveled with a #15 scalpel blade.  Given the location of the defect, shape of the defect and the proximity to free margins an O-Z flap was deemed most appropriate.  Using a sterile surgical marker, an appropriate transposition flap was drawn incorporating the defect and placing the expected incisions within the relaxed skin tension lines where possible. The area thus outlined was incised deep to adipose tissue with a #15 scalpel blade.  The skin margins were undermined to an appropriate distance in all directions utilizing iris scissors. Double O-Z Flap Text: The defect edges were debeveled with a #15 scalpel blade.  Given the location of the defect, shape of the defect and the proximity to free margins a Double O-Z flap was deemed most appropriate.  Using a sterile surgical marker, an appropriate transposition flap was drawn incorporating the defect and placing the expected incisions within the relaxed skin tension lines where possible. The area thus outlined was incised deep to adipose tissue with a #15 scalpel blade.  The skin margins were undermined to an appropriate distance in all directions utilizing iris scissors. V-Y Flap Text: The defect edges were debeveled with a #15 scalpel blade.  Given the location of the defect, shape of the defect and the proximity to free margins a V-Y flap was deemed most appropriate.  Using a sterile surgical marker, an appropriate advancement flap was drawn incorporating the defect and placing the expected incisions within the relaxed skin tension lines where possible.    The area thus outlined was incised deep to adipose tissue with a #15 scalpel blade.  The skin margins were undermined to an appropriate distance in all directions utilizing iris scissors. Advancement-Rotation Flap Text: The defect edges were debeveled with a #15 scalpel blade.  Given the location of the defect, shape of the defect and the proximity to free margins an advancement-rotation flap was deemed most appropriate.  Using a sterile surgical marker, an appropriate flap was drawn incorporating the defect and placing the expected incisions within the relaxed skin tension lines where possible. The area thus outlined was incised deep to adipose tissue with a #15 scalpel blade.  The skin margins were undermined to an appropriate distance in all directions utilizing iris scissors. Mercedes Flap Text: The defect edges were debeveled with a #15 scalpel blade.  Given the location of the defect, shape of the defect and the proximity to free margins a Mercedes flap was deemed most appropriate.  Using a sterile surgical marker, an appropriate advancement flap was drawn incorporating the defect and placing the expected incisions within the relaxed skin tension lines where possible. The area thus outlined was incised deep to adipose tissue with a #15 scalpel blade.  The skin margins were undermined to an appropriate distance in all directions utilizing iris scissors. Modified Advancement Flap Text: The defect edges were debeveled with a #15 scalpel blade.  Given the location of the defect, shape of the defect and the proximity to free margins a modified advancement flap was deemed most appropriate.  Using a sterile surgical marker, an appropriate advancement flap was drawn incorporating the defect and placing the expected incisions within the relaxed skin tension lines where possible.    The area thus outlined was incised deep to adipose tissue with a #15 scalpel blade.  The skin margins were undermined to an appropriate distance in all directions utilizing iris scissors. Mucosal Advancement Flap Text: Given the location of the defect, shape of the defect and the proximity to free margins a mucosal advancement flap was deemed most appropriate. Incisions were made with a 15 blade scalpel in the appropriate fashion along the cutaneous vermilion border and the mucosal lip. The remaining actinically damaged mucosal tissue was excised.  The mucosal advancement flap was then elevated to the gingival sulcus with care taken to preserve the neurovascular structures and advanced into the primary defect. Care was taken to ensure that precise realignment of the vermilion border was achieved. Peng Advancement Flap Text: The defect edges were debeveled with a #15 scalpel blade.  Given the location of the defect, shape of the defect and the proximity to free margins a Peng advancement flap was deemed most appropriate.  Using a sterile surgical marker, an appropriate advancement flap was drawn incorporating the defect and placing the expected incisions within the relaxed skin tension lines where possible. The area thus outlined was incised deep to adipose tissue with a #15 scalpel blade.  The skin margins were undermined to an appropriate distance in all directions utilizing iris scissors. Hatchet Flap Text: The defect edges were debeveled with a #15 scalpel blade.  Given the location of the defect, shape of the defect and the proximity to free margins a hatchet flap was deemed most appropriate.  Using a sterile surgical marker, an appropriate hatchet flap was drawn incorporating the defect and placing the expected incisions within the relaxed skin tension lines where possible.    The area thus outlined was incised deep to adipose tissue with a #15 scalpel blade.  The skin margins were undermined to an appropriate distance in all directions utilizing iris scissors. Rotation Flap Text: The defect edges were debeveled with a #15 scalpel blade.  Given the location of the defect, shape of the defect and the proximity to free margins a rotation flap was deemed most appropriate.  Using a sterile surgical marker, an appropriate rotation flap was drawn incorporating the defect and placing the expected incisions within the relaxed skin tension lines where possible.    The area thus outlined was incised deep to adipose tissue with a #15 scalpel blade.  The skin margins were undermined to an appropriate distance in all directions utilizing iris scissors. Spiral Flap Text: The defect edges were debeveled with a #15 scalpel blade.  Given the location of the defect, shape of the defect and the proximity to free margins a spiral flap was deemed most appropriate.  Using a sterile surgical marker, an appropriate rotation flap was drawn incorporating the defect and placing the expected incisions within the relaxed skin tension lines where possible. The area thus outlined was incised deep to adipose tissue with a #15 scalpel blade.  The skin margins were undermined to an appropriate distance in all directions utilizing iris scissors. Star Wedge Flap Text: The defect edges were debeveled with a #15 scalpel blade.  Given the location of the defect, shape of the defect and the proximity to free margins a star wedge flap was deemed most appropriate.  Using a sterile surgical marker, an appropriate rotation flap was drawn incorporating the defect and placing the expected incisions within the relaxed skin tension lines where possible. The area thus outlined was incised deep to adipose tissue with a #15 scalpel blade.  The skin margins were undermined to an appropriate distance in all directions utilizing iris scissors. Transposition Flap Text: The defect edges were debeveled with a #15 scalpel blade.  Given the location of the defect and the proximity to free margins a transposition flap was deemed most appropriate.  Using a sterile surgical marker, an appropriate transposition flap was drawn incorporating the defect.    The area thus outlined was incised deep to adipose tissue with a #15 scalpel blade.  The skin margins were undermined to an appropriate distance in all directions utilizing iris scissors. Muscle Hinge Flap Text: The defect edges were debeveled with a #15 scalpel blade.  Given the size, depth and location of the defect and the proximity to free margins a muscle hinge flap was deemed most appropriate.  Using a sterile surgical marker, an appropriate hinge flap was drawn incorporating the defect. The area thus outlined was incised with a #15 scalpel blade.  The skin margins were undermined to an appropriate distance in all directions utilizing iris scissors. Nasal Turnover Hinge Flap Text: The defect edges were debeveled with a #15 scalpel blade.  Given the size, depth, location of the defect and the defect being full thickness a nasal turnover hinge flap was deemed most appropriate.  Using a sterile surgical marker, an appropriate hinge flap was drawn incorporating the defect. The area thus outlined was incised with a #15 scalpel blade. The flap was designed to recreate the nasal mucosal lining and the alar rim. The skin margins were undermined to an appropriate distance in all directions utilizing iris scissors. Nasalis-Muscle-Based Myocutaneous Island Pedicle Flap Text: Using a #15 blade, an incision was made around the donor flap to the level of the nasalis muscle. Wide lateral undermining was then performed in both the subcutaneous plane above the nasalis muscle, and in a submuscular plane just above periosteum. This allowed the formation of a free nasalis muscle axial pedicle (based on the angular artery) which was still attached to the actual cutaneous flap, increasing its mobility and vascular viability. Hemostasis was obtained with pinpoint electrocoagulation. The flap was mobilized into position and the pivotal anchor points positioned and stabilized with buried interrupted sutures. Subcutaneous and dermal tissues were closed in a multilayered fashion with sutures. Tissue redundancies were excised, and the epidermal edges were apposed without significant tension and sutured with sutures. Orbicularis Oris Muscle Flap Text: The defect edges were debeveled with a #15 scalpel blade.  Given that the defect affected the competency of the oral sphincter an obicularis oris muscle flap was deemed most appropriate to restore this competency and normal muscle function.  Using a sterile surgical marker, an appropriate flap was drawn incorporating the defect. The area thus outlined was incised with a #15 scalpel blade. Melolabial Transposition Flap Text: The defect edges were debeveled with a #15 scalpel blade.  Given the location of the defect and the proximity to free margins a melolabial flap was deemed most appropriate.  Using a sterile surgical marker, an appropriate melolabial transposition flap was drawn incorporating the defect.    The area thus outlined was incised deep to adipose tissue with a #15 scalpel blade.  The skin margins were undermined to an appropriate distance in all directions utilizing iris scissors. Rhombic Flap Text: The defect edges were debeveled with a #15 scalpel blade.  Given the location of the defect and the proximity to free margins a rhombic flap was deemed most appropriate.  Using a sterile surgical marker, an appropriate rhombic flap was drawn incorporating the defect.    The area thus outlined was incised deep to adipose tissue with a #15 scalpel blade.  The skin margins were undermined to an appropriate distance in all directions utilizing iris scissors. Rhomboid Transposition Flap Text: The defect edges were debeveled with a #15 scalpel blade.  Given the location of the defect and the proximity to free margins a rhomboid transposition flap was deemed most appropriate.  Using a sterile surgical marker, an appropriate rhomboid flap was drawn incorporating the defect.    The area thus outlined was incised deep to adipose tissue with a #15 scalpel blade.  The skin margins were undermined to an appropriate distance in all directions utilizing iris scissors. Bi-Rhombic Flap Text: The defect edges were debeveled with a #15 scalpel blade.  Given the location of the defect and the proximity to free margins a bi-rhombic flap was deemed most appropriate.  Using a sterile surgical marker, an appropriate rhombic flap was drawn incorporating the defect. The area thus outlined was incised deep to adipose tissue with a #15 scalpel blade.  The skin margins were undermined to an appropriate distance in all directions utilizing iris scissors. Helical Rim Advancement Flap Text: The defect edges were debeveled with a #15 blade scalpel.  Given the location of the defect and the proximity to free margins (helical rim) a double helical rim advancement flap was deemed most appropriate.  Using a sterile surgical marker, the appropriate advancement flaps were drawn incorporating the defect and placing the expected incisions between the helical rim and antihelix where possible.  The area thus outlined was incised through and through with a #15 scalpel blade.  With a skin hook and iris scissors, the flaps were gently and sharply undermined and freed up. Bilateral Helical Rim Advancement Flap Text: The defect edges were debeveled with a #15 blade scalpel.  Given the location of the defect and the proximity to free margins (helical rim) a bilateral helical rim advancement flap was deemed most appropriate.  Using a sterile surgical marker, the appropriate advancement flaps were drawn incorporating the defect and placing the expected incisions between the helical rim and antihelix where possible.  The area thus outlined was incised through and through with a #15 scalpel blade.  With a skin hook and iris scissors, the flaps were gently and sharply undermined and freed up. Ear Star Wedge Flap Text: The defect edges were debeveled with a #15 blade scalpel.  Given the location of the defect and the proximity to free margins (helical rim) an ear star wedge flap was deemed most appropriate.  Using a sterile surgical marker, the appropriate flap was drawn incorporating the defect and placing the expected incisions between the helical rim and antihelix where possible.  The area thus outlined was incised through and through with a #15 scalpel blade. Banner Transposition Flap Text: The defect edges were debeveled with a #15 scalpel blade.  Given the location of the defect and the proximity to free margins a Banner transposition flap was deemed most appropriate.  Using a sterile surgical marker, an appropriate flap drawn around the defect. The area thus outlined was incised deep to adipose tissue with a #15 scalpel blade.  The skin margins were undermined to an appropriate distance in all directions utilizing iris scissors. Bilobed Flap Text: The defect edges were debeveled with a #15 scalpel blade.  Given the location of the defect and the proximity to free margins a bilobe flap was deemed most appropriate.  Using a sterile surgical marker, an appropriate bilobe flap drawn around the defect.    The area thus outlined was incised deep to adipose tissue with a #15 scalpel blade.  The skin margins were undermined to an appropriate distance in all directions utilizing iris scissors. Bilobed Transposition Flap Text: The defect edges were debeveled with a #15 scalpel blade.  Given the location of the defect and the proximity to free margins a bilobed transposition flap was deemed most appropriate.  Using a sterile surgical marker, an appropriate bilobe flap drawn around the defect.    The area thus outlined was incised deep to adipose tissue with a #15 scalpel blade.  The skin margins were undermined to an appropriate distance in all directions utilizing iris scissors. Trilobed Flap Text: The defect edges were debeveled with a #15 scalpel blade.  Given the location of the defect and the proximity to free margins a trilobed flap was deemed most appropriate.  Using a sterile surgical marker, an appropriate trilobed flap drawn around the defect.    The area thus outlined was incised deep to adipose tissue with a #15 scalpel blade.  The skin margins were undermined to an appropriate distance in all directions utilizing iris scissors. Dorsal Nasal Flap Text: The defect edges were debeveled with a #15 scalpel blade.  Given the location of the defect and the proximity to free margins a dorsal nasal flap was deemed most appropriate.  Using a sterile surgical marker, an appropriate dorsal nasal flap was drawn around the defect.    The area thus outlined was incised deep to adipose tissue with a #15 scalpel blade.  The skin margins were undermined to an appropriate distance in all directions utilizing iris scissors. Island Pedicle Flap Text: The defect edges were debeveled with a #15 scalpel blade.  Given the location of the defect, shape of the defect and the proximity to free margins an island pedicle advancement flap was deemed most appropriate.  Using a sterile surgical marker, an appropriate advancement flap was drawn incorporating the defect, outlining the appropriate donor tissue and placing the expected incisions within the relaxed skin tension lines where possible.    The area thus outlined was incised deep to adipose tissue with a #15 scalpel blade.  The skin margins were undermined to an appropriate distance in all directions around the primary defect and laterally outward around the island pedicle utilizing iris scissors.  There was minimal undermining beneath the pedicle flap. Island Pedicle Flap With Canthal Suspension Text: The defect edges were debeveled with a #15 scalpel blade.  Given the location of the defect, shape of the defect and the proximity to free margins an island pedicle advancement flap was deemed most appropriate.  Using a sterile surgical marker, an appropriate advancement flap was drawn incorporating the defect, outlining the appropriate donor tissue and placing the expected incisions within the relaxed skin tension lines where possible. The area thus outlined was incised deep to adipose tissue with a #15 scalpel blade.  The skin margins were undermined to an appropriate distance in all directions around the primary defect and laterally outward around the island pedicle utilizing iris scissors.  There was minimal undermining beneath the pedicle flap. A suspension suture was placed in the canthal tendon to prevent tension and prevent ectropion. Alar Island Pedicle Flap Text: The defect edges were debeveled with a #15 scalpel blade.  Given the location of the defect, shape of the defect and the proximity to the alar rim an island pedicle advancement flap was deemed most appropriate.  Using a sterile surgical marker, an appropriate advancement flap was drawn incorporating the defect, outlining the appropriate donor tissue and placing the expected incisions within the nasal ala running parallel to the alar rim. The area thus outlined was incised with a #15 scalpel blade.  The skin margins were undermined minimally to an appropriate distance in all directions around the primary defect and laterally outward around the island pedicle utilizing iris scissors.  There was minimal undermining beneath the pedicle flap. Double Island Pedicle Flap Text: The defect edges were debeveled with a #15 scalpel blade.  Given the location of the defect, shape of the defect and the proximity to free margins a double island pedicle advancement flap was deemed most appropriate.  Using a sterile surgical marker, an appropriate advancement flap was drawn incorporating the defect, outlining the appropriate donor tissue and placing the expected incisions within the relaxed skin tension lines where possible.    The area thus outlined was incised deep to adipose tissue with a #15 scalpel blade.  The skin margins were undermined to an appropriate distance in all directions around the primary defect and laterally outward around the island pedicle utilizing iris scissors.  There was minimal undermining beneath the pedicle flap. Island Pedicle Flap-Requiring Vessel Identification Text: The defect edges were debeveled with a #15 scalpel blade.  Given the location of the defect, shape of the defect and the proximity to free margins an island pedicle advancement flap was deemed most appropriate.  Using a sterile surgical marker, an appropriate advancement flap was drawn, based on the axial vessel mentioned above, incorporating the defect, outlining the appropriate donor tissue and placing the expected incisions within the relaxed skin tension lines where possible.    The area thus outlined was incised deep to adipose tissue with a #15 scalpel blade.  The skin margins were undermined to an appropriate distance in all directions around the primary defect and laterally outward around the island pedicle utilizing iris scissors.  There was minimal undermining beneath the pedicle flap. Keystone Flap Text: The defect edges were debeveled with a #15 scalpel blade.  Given the location of the defect, shape of the defect a keystone flap was deemed most appropriate.  Using a sterile surgical marker, an appropriate keystone flap was drawn incorporating the defect, outlining the appropriate donor tissue and placing the expected incisions within the relaxed skin tension lines where possible. The area thus outlined was incised deep to adipose tissue with a #15 scalpel blade.  The skin margins were undermined to an appropriate distance in all directions around the primary defect and laterally outward around the flap utilizing iris scissors. O-T Plasty Text: The defect edges were debeveled with a #15 scalpel blade.  Given the location of the defect, shape of the defect and the proximity to free margins an O-T plasty was deemed most appropriate.  Using a sterile surgical marker, an appropriate O-T plasty was drawn incorporating the defect and placing the expected incisions within the relaxed skin tension lines where possible.    The area thus outlined was incised deep to adipose tissue with a #15 scalpel blade.  The skin margins were undermined to an appropriate distance in all directions utilizing iris scissors. O-Z Plasty Text: The defect edges were debeveled with a #15 scalpel blade.  Given the location of the defect, shape of the defect and the proximity to free margins an O-Z plasty (double transposition flap) was deemed most appropriate.  Using a sterile surgical marker, the appropriate transposition flaps were drawn incorporating the defect and placing the expected incisions within the relaxed skin tension lines where possible.    The area thus outlined was incised deep to adipose tissue with a #15 scalpel blade.  The skin margins were undermined to an appropriate distance in all directions utilizing iris scissors.  Hemostasis was achieved with electrocautery.  The flaps were then transposed into place, one clockwise and the other counterclockwise, and anchored with interrupted buried subcutaneous sutures. Double O-Z Plasty Text: The defect edges were debeveled with a #15 scalpel blade.  Given the location of the defect, shape of the defect and the proximity to free margins a Double O-Z plasty (double transposition flap) was deemed most appropriate.  Using a sterile surgical marker, the appropriate transposition flaps were drawn incorporating the defect and placing the expected incisions within the relaxed skin tension lines where possible. The area thus outlined was incised deep to adipose tissue with a #15 scalpel blade.  The skin margins were undermined to an appropriate distance in all directions utilizing iris scissors.  Hemostasis was achieved with electrocautery.  The flaps were then transposed into place, one clockwise and the other counterclockwise, and anchored with interrupted buried subcutaneous sutures. V-Y Plasty Text: The defect edges were debeveled with a #15 scalpel blade.  Given the location of the defect, shape of the defect and the proximity to free margins an V-Y advancement flap was deemed most appropriate.  Using a sterile surgical marker, an appropriate advancement flap was drawn incorporating the defect and placing the expected incisions within the relaxed skin tension lines where possible.    The area thus outlined was incised deep to adipose tissue with a #15 scalpel blade.  The skin margins were undermined to an appropriate distance in all directions utilizing iris scissors. H Plasty Text: Given the location of the defect, shape of the defect and the proximity to free margins a H-plasty was deemed most appropriate for repair.  Using a sterile surgical marker, the appropriate advancement arms of the H-plasty were drawn incorporating the defect and placing the expected incisions within the relaxed skin tension lines where possible. The area thus outlined was incised deep to adipose tissue with a #15 scalpel blade. The skin margins were undermined to an appropriate distance in all directions utilizing iris scissors.  The opposing advancement arms were then advanced into place in opposite direction and anchored with interrupted buried subcutaneous sutures. W Plasty Text: The lesion was extirpated to the level of the fat with a #15 scalpel blade.  Given the location of the defect, shape of the defect and the proximity to free margins a W-plasty was deemed most appropriate for repair.  Using a sterile surgical marker, the appropriate transposition arms of the W-plasty were drawn incorporating the defect and placing the expected incisions within the relaxed skin tension lines where possible.    The area thus outlined was incised deep to adipose tissue with a #15 scalpel blade.  The skin margins were undermined to an appropriate distance in all directions utilizing iris scissors.  The opposing transposition arms were then transposed into place in opposite direction and anchored with interrupted buried subcutaneous sutures. Z Plasty Text: The lesion was extirpated to the level of the fat with a #15 scalpel blade.  Given the location of the defect, shape of the defect and the proximity to free margins a Z-plasty was deemed most appropriate for repair.  Using a sterile surgical marker, the appropriate transposition arms of the Z-plasty were drawn incorporating the defect and placing the expected incisions within the relaxed skin tension lines where possible.    The area thus outlined was incised deep to adipose tissue with a #15 scalpel blade.  The skin margins were undermined to an appropriate distance in all directions utilizing iris scissors.  The opposing transposition arms were then transposed into place in opposite direction and anchored with interrupted buried subcutaneous sutures. Zygomaticofacial Flap Text: Given the location of the defect, shape of the defect and the proximity to free margins a zygomaticofacial flap was deemed most appropriate for repair.  Using a sterile surgical marker, the appropriate flap was drawn incorporating the defect and placing the expected incisions within the relaxed skin tension lines where possible. The area thus outlined was incised deep to adipose tissue with a #15 scalpel blade with preservation of a vascular pedicle.  The skin margins were undermined to an appropriate distance in all directions utilizing iris scissors.  The flap was then placed into the defect and anchored with interrupted buried subcutaneous sutures. Cheek Interpolation Flap Text: A decision was made to reconstruct the defect utilizing an interpolation axial flap and a staged reconstruction.  A telfa template was made of the defect.  This telfa template was then used to outline the Cheek Interpolation flap.  The donor area for the pedicle flap was then injected with anesthesia.  The flap was excised through the skin and subcutaneous tissue down to the layer of the underlying musculature.  The interpolation flap was carefully excised within this deep plane to maintain its blood supply.  The edges of the donor site were undermined.   The donor site was closed in a primary fashion.  The pedicle was then rotated into position and sutured.  Once the tube was sutured into place, adequate blood supply was confirmed with blanching and refill.  The pedicle was then wrapped with xeroform gauze and dressed appropriately with a telfa and gauze bandage to ensure continued blood supply and protect the attached pedicle. Cheek-To-Nose Interpolation Flap Text: A decision was made to reconstruct the defect utilizing an interpolation axial flap and a staged reconstruction.  A telfa template was made of the defect.  This telfa template was then used to outline the Cheek-To-Nose Interpolation flap.  The donor area for the pedicle flap was then injected with anesthesia.  The flap was excised through the skin and subcutaneous tissue down to the layer of the underlying musculature.  The interpolation flap was carefully excised within this deep plane to maintain its blood supply.  The edges of the donor site were undermined.   The donor site was closed in a primary fashion.  The pedicle was then rotated into position and sutured.  Once the tube was sutured into place, adequate blood supply was confirmed with blanching and refill.  The pedicle was then wrapped with xeroform gauze and dressed appropriately with a telfa and gauze bandage to ensure continued blood supply and protect the attached pedicle. Interpolation Flap Text: A decision was made to reconstruct the defect utilizing an interpolation axial flap and a staged reconstruction.  A telfa template was made of the defect.  This telfa template was then used to outline the interpolation flap.  The donor area for the pedicle flap was then injected with anesthesia.  The flap was excised through the skin and subcutaneous tissue down to the layer of the underlying musculature.  The interpolation flap was carefully excised within this deep plane to maintain its blood supply.  The edges of the donor site were undermined.   The donor site was closed in a primary fashion.  The pedicle was then rotated into position and sutured.  Once the tube was sutured into place, adequate blood supply was confirmed with blanching and refill.  The pedicle was then wrapped with xeroform gauze and dressed appropriately with a telfa and gauze bandage to ensure continued blood supply and protect the attached pedicle. Melolabial Interpolation Flap Text: A decision was made to reconstruct the defect utilizing an interpolation axial flap and a staged reconstruction.  A telfa template was made of the defect.  This telfa template was then used to outline the melolabial interpolation flap.  The donor area for the pedicle flap was then injected with anesthesia.  The flap was excised through the skin and subcutaneous tissue down to the layer of the underlying musculature.  The pedicle flap was carefully excised within this deep plane to maintain its blood supply.  The edges of the donor site were undermined.   The donor site was closed in a primary fashion.  The pedicle was then rotated into position and sutured.  Once the tube was sutured into place, adequate blood supply was confirmed with blanching and refill.  The pedicle was then wrapped with xeroform gauze and dressed appropriately with a telfa and gauze bandage to ensure continued blood supply and protect the attached pedicle. Mastoid Interpolation Flap Text: A decision was made to reconstruct the defect utilizing an interpolation axial flap and a staged reconstruction.  A telfa template was made of the defect.  This telfa template was then used to outline the mastoid interpolation flap.  The donor area for the pedicle flap was then injected with anesthesia.  The flap was excised through the skin and subcutaneous tissue down to the layer of the underlying musculature.  The pedicle flap was carefully excised within this deep plane to maintain its blood supply.  The edges of the donor site were undermined.   The donor site was closed in a primary fashion.  The pedicle was then rotated into position and sutured.  Once the tube was sutured into place, adequate blood supply was confirmed with blanching and refill.  The pedicle was then wrapped with xeroform gauze and dressed appropriately with a telfa and gauze bandage to ensure continued blood supply and protect the attached pedicle. Posterior Auricular Interpolation Flap Text: A decision was made to reconstruct the defect utilizing an interpolation axial flap and a staged reconstruction.  A telfa template was made of the defect.  This telfa template was then used to outline the posterior auricular interpolation flap.  The donor area for the pedicle flap was then injected with anesthesia.  The flap was excised through the skin and subcutaneous tissue down to the layer of the underlying musculature.  The pedicle flap was carefully excised within this deep plane to maintain its blood supply.  The edges of the donor site were undermined.   The donor site was closed in a primary fashion.  The pedicle was then rotated into position and sutured.  Once the tube was sutured into place, adequate blood supply was confirmed with blanching and refill.  The pedicle was then wrapped with xeroform gauze and dressed appropriately with a telfa and gauze bandage to ensure continued blood supply and protect the attached pedicle. Paramedian Forehead Flap Text: A decision was made to reconstruct the defect utilizing an interpolation axial flap and a staged reconstruction.  A telfa template was made of the defect.  This telfa template was then used to outline the paramedian forehead pedicle flap.  The donor area for the pedicle flap was then injected with anesthesia.  The flap was excised through the skin and subcutaneous tissue down to the layer of the underlying musculature.  The pedicle flap was carefully excised within this deep plane to maintain its blood supply.  The edges of the donor site were undermined.   The donor site was closed in a primary fashion.  The pedicle was then rotated into position and sutured.  Once the tube was sutured into place, adequate blood supply was confirmed with blanching and refill.  The pedicle was then wrapped with xeroform gauze and dressed appropriately with a telfa and gauze bandage to ensure continued blood supply and protect the attached pedicle. Cheiloplasty (Less Than 50%) Text: A decision was made to reconstruct the defect with a  cheiloplasty.  The defect was undermined extensively.  Additional obicularis oris muscle was excised with a 15 blade scalpel.  The defect was converted into a full thickness wedge, of less than 50% of the vertical height of the lip, to facilite a better cosmetic result.  Small vessels were then tied off with 5-0 monocyrl. The obicularis oris, superficial fascia, adipose and dermis were then reapproximated.  After the deeper layers were approximated the epidermis was reapproximated with particular care given to realign the vermilion border. Cheiloplasty (Complex) Text: A decision was made to reconstruct the defect with a  cheiloplasty.  The defect was undermined extensively.  Additional obicularis oris muscle was excised with a 15 blade scalpel.  The defect was converted into a full thickness wedge to facilite a better cosmetic result.  Small vessels were then tied off with 5-0 monocyrl. The obicularis oris, superficial fascia, adipose and dermis were then reapproximated.  After the deeper layers were approximated the epidermis was reapproximated with particular care given to realign the vermilion border. Ear Wedge Repair Text: A wedge excision was completed by carrying down an excision through the full thickness of the ear and cartilage with an inward facing Burow's triangle. The wound was then closed in a layered fashion. Full Thickness Lip Wedge Repair (Flap) Text: Given the location of the defect and the proximity to free margins a full thickness wedge repair was deemed most appropriate.  Using a sterile surgical marker, the appropriate repair was drawn incorporating the defect and placing the expected incisions perpendicular to the vermilion border.  The vermilion border was also meticulously outlined to ensure appropriate reapproximation during the repair.  The area thus outlined was incised through and through with a #15 scalpel blade.  The muscularis and dermis were reaproximated with deep sutures following hemostasis. Care was taken to realign the vermilion border before proceeding with the superficial closure.  Once the vermilion was realigned the superfical and mucosal closure was finished. Ftsg Text: The defect edges were debeveled with a #15 scalpel blade.  Given the location of the defect, shape of the defect and the proximity to free margins a full thickness skin graft was deemed most appropriate.  Using a sterile surgical marker, the primary defect shape was transferred to the donor site. The area thus outlined was incised deep to adipose tissue with a #15 scalpel blade.  The harvested graft was then trimmed of adipose tissue until only dermis and epidermis was left.  The skin margins of the secondary defect were undermined to an appropriate distance in all directions utilizing iris scissors.  The secondary defect was closed with interrupted buried subcutaneous sutures.  The skin edges were then re-apposed with running  sutures.  The skin graft was then placed in the primary defect and oriented appropriately. Split-Thickness Skin Graft Text: The defect edges were debeveled with a #15 scalpel blade.  Given the location of the defect, shape of the defect and the proximity to free margins a split thickness skin graft was deemed most appropriate.  Using a sterile surgical marker, the primary defect shape was transferred to the donor site. The split thickness graft was then harvested.  The skin graft was then placed in the primary defect and oriented appropriately. Burow's Graft Text: The defect edges were debeveled with a #15 scalpel blade.  Given the location of the defect, shape of the defect, the proximity to free margins and the presence of a standing cone deformity a Burow's skin graft was deemed most appropriate. The standing cone was removed and this tissue was then trimmed to the shape of the primary defect. The adipose tissue was also removed until only dermis and epidermis were left.  The skin margins of the secondary defect were undermined to an appropriate distance in all directions utilizing iris scissors.  The secondary defect was closed with interrupted buried subcutaneous sutures.  The skin edges were then re-apposed with running  sutures.  The skin graft was then placed in the primary defect and oriented appropriately. Cartilage Graft Text: The defect edges were debeveled with a #15 scalpel blade.  Given the location of the defect, shape of the defect, the fact the defect involved a full thickness cartilage defect a cartilage graft was deemed most appropriate.  An appropriate donor site was identified, cleansed, and anesthetized. The cartilage graft was then harvested and transferred to the recipient site, oriented appropriately and then sutured into place.  The secondary defect was then repaired using a primary closure. Composite Graft Text: The defect edges were debeveled with a #15 scalpel blade.  Given the location of the defect, shape of the defect, the proximity to free margins and the fact the defect was full thickness a composite graft was deemed most appropriate.  The defect was outline and then transferred to the donor site.  A full thickness graft was then excised from the donor site. The graft was then placed in the primary defect, oriented appropriately and then sutured into place.  The secondary defect was then repaired using a primary closure. Epidermal Autograft Text: The defect edges were debeveled with a #15 scalpel blade.  Given the location of the defect, shape of the defect and the proximity to free margins an epidermal autograft was deemed most appropriate.  Using a sterile surgical marker, the primary defect shape was transferred to the donor site. The epidermal graft was then harvested.  The skin graft was then placed in the primary defect and oriented appropriately. Dermal Autograft Text: The defect edges were debeveled with a #15 scalpel blade.  Given the location of the defect, shape of the defect and the proximity to free margins a dermal autograft was deemed most appropriate.  Using a sterile surgical marker, the primary defect shape was transferred to the donor site. The area thus outlined was incised deep to adipose tissue with a #15 scalpel blade.  The harvested graft was then trimmed of adipose and epidermal tissue until only dermis was left.  The skin graft was then placed in the primary defect and oriented appropriately. Skin Substitute Text: The defect edges were debeveled with a #15 scalpel blade.  Given the location of the defect, shape of the defect and the proximity to free margins a skin substitute graft was deemed most appropriate.  The graft material was trimmed to fit the size of the defect. The graft was then placed in the primary defect and oriented appropriately. Tissue Cultured Epidermal Autograft Text: The defect edges were debeveled with a #15 scalpel blade.  Given the location of the defect, shape of the defect and the proximity to free margins a tissue cultured epidermal autograft was deemed most appropriate.  The graft was then trimmed to fit the size of the defect.  The graft was then placed in the primary defect and oriented appropriately. Xenograft Text: The defect edges were debeveled with a #15 scalpel blade.  Given the location of the defect, shape of the defect and the proximity to free margins a xenograft was deemed most appropriate.  The graft was then trimmed to fit the size of the defect.  The graft was then placed in the primary defect and oriented appropriately. Purse String (Simple) Text: Given the location of the defect and the characteristics of the surrounding skin a purse string closure was deemed most appropriate.  Undermining was performed circumfirentially around the surgical defect.  A purse string suture was then placed and tightened. Purse String (Intermediate) Text: Given the location of the defect and the characteristics of the surrounding skin a purse string intermediate closure was deemed most appropriate.  Undermining was performed circumfirentially around the surgical defect.  A purse string suture was then placed and tightened. Partial Purse String (Simple) Text: Given the location of the defect and the characteristics of the surrounding skin a simple purse string closure was deemed most appropriate.  Undermining was performed circumfirentially around the surgical defect.  A purse string suture was then placed and tightened. Wound tension only allowed a partial closure of the circular defect. Partial Purse String (Intermediate) Text: Given the location of the defect and the characteristics of the surrounding skin an intermediate purse string closure was deemed most appropriate.  Undermining was performed circumfirentially around the surgical defect.  A purse string suture was then placed and tightened. Wound tension only allowed a partial closure of the circular defect. Localized Dermabrasion Text: The patient was draped in routine manner.  Localized dermabrasion using 3 x 17 mm wire brush was performed in routine manner to papillary dermis. This spot dermabrasion is being performed to complete skin cancer reconstruction. It also will eliminate the other sun damaged precancerous cells that are known to be part of the regional effect of a lifetime's worth of sun exposure. This localized dermabrasion is therapeutic and should not be considered cosmetic in any regard. Tarsorrhaphy Text: A tarsorrhaphy was performed using Frost sutures. Complex Repair And Flap Additional Text (Will Appearing After The Standard Complex Repair Text): The complex repair was not sufficient to completely close the primary defect. The remaining additional defect was repaired with the flap mentioned below. Complex Repair And Graft Additional Text (Will Appearing After The Standard Complex Repair Text): The complex repair was not sufficient to completely close the primary defect. The remaining additional defect was repaired with the graft mentioned below. Manual Repair Warning Statement: We plan on removing the manually selected variable below in favor of our much easier automatic structured text blocks found in the previous tab. We decided to do this to help make the flow better and give you the full power of structured data. Manual selection is never going to be ideal in our platform and I would encourage you to avoid using manual selection from this point on, especially since I will be sunsetting this feature. It is important that you do one of two things with the customized text below. First, you can save all of the text in a word file so you can have it for future reference. Second, transfer the text to the appropriate area in the Library tab. Lastly, if there is a flap or graft type which we do not have you need to let us know right away so I can add it in before the variable is hidden. No need to panic, we plan to give you roughly 6 months to make the change. Same Histology In Subsequent Stages Text: The pattern and morphology of the tumor is as described in the first stage. No Residual Tumor Seen Histology Text: There were no malignant cells seen in the sections examined. Inflammation Suggestive Of Cancer Camouflage Histology Text: There was a dense lymphocytic infiltrate which prevented adequate histologic evaluation of adjacent structures. Bcc Histology Text: There were numerous aggregates of basaloid cells. Bcc Infiltrative Histology Text: There were numerous aggregates of basaloid cells demonstrating an infiltrative pattern. Mart-1 - Positive Histology Text: MART-1 staining demonstrates areas of higher density and clustering of melanocytes with Pagetoid spread upwards within the epidermis. The surgical margins are positive for tumor cells. Mart-1 - Negative Histology Text: MART-1 staining demonstrates a normal density and pattern of melanocytes along the dermal-epidermal junction. The surgical margins are negative for tumor cells. Information: Selecting Yes will display possible errors in your note based on the variables you have selected. This validation is only offered as a suggestion for you. PLEASE NOTE THAT THE VALIDATION TEXT WILL BE REMOVED WHEN YOU FINALIZE YOUR NOTE. IF YOU WANT TO FAX A PRELIMINARY NOTE YOU WILL NEED TO TOGGLE THIS TO 'NO' IF YOU DO NOT WANT IT IN YOUR FAXED NOTE.

## 2022-03-19 PROBLEM — C64.1 RENAL CELL CARCINOMA OF RIGHT KIDNEY (HCC): Status: ACTIVE | Noted: 2019-05-10

## 2022-03-19 PROBLEM — R19.00 ABDOMINAL MASS: Status: ACTIVE | Noted: 2018-12-10

## 2022-03-19 PROBLEM — R07.9 ACUTE CHEST PAIN: Status: ACTIVE | Noted: 2018-12-10

## 2022-03-19 PROBLEM — R39.89 ABNORMAL PROSTATE EXAM: Status: ACTIVE | Noted: 2017-06-22

## 2022-03-19 PROBLEM — J30.1 HAY FEVER: Status: ACTIVE | Noted: 2019-05-24

## 2022-03-19 PROBLEM — F20.9 SCHIZOPHRENIA (HCC): Status: ACTIVE | Noted: 2018-11-07

## 2022-08-19 ENCOUNTER — HOSPITAL ENCOUNTER (OUTPATIENT)
Dept: GENERAL RADIOLOGY | Age: 67
Discharge: HOME | End: 2022-08-19
Payer: MEDICARE

## 2022-08-19 ENCOUNTER — TRANSCRIBE ORDER (OUTPATIENT)
Dept: REGISTRATION | Age: 67
End: 2022-08-19

## 2022-08-19 DIAGNOSIS — R10.9 STOMACH ACHE: ICD-10-CM

## 2022-08-19 DIAGNOSIS — R10.9 STOMACH ACHE: Primary | ICD-10-CM

## 2022-08-19 PROCEDURE — 74019 RADEX ABDOMEN 2 VIEWS: CPT

## 2022-10-07 ENCOUNTER — HOSPITAL ENCOUNTER (OUTPATIENT)
Dept: NUCLEAR MEDICINE | Age: 67
Discharge: HOME | End: 2022-10-07
Attending: UROLOGY
Payer: MEDICARE

## 2022-10-07 ENCOUNTER — HOSPITAL ENCOUNTER (OUTPATIENT)
Dept: CT IMAGING | Age: 67
Discharge: HOME | End: 2022-10-07
Attending: UROLOGY
Payer: MEDICARE

## 2022-10-07 DIAGNOSIS — C61 PROSTATE CANCER (HCC): ICD-10-CM

## 2022-10-07 LAB — CREAT UR-MCNC: 1.5 MG/DL (ref 0.6–1.3)

## 2022-10-07 PROCEDURE — 74011000636 HC RX REV CODE- 636: Performed by: UROLOGY

## 2022-10-07 PROCEDURE — 82565 ASSAY OF CREATININE: CPT

## 2022-10-07 PROCEDURE — 78306 BONE IMAGING WHOLE BODY: CPT

## 2022-10-07 PROCEDURE — 74177 CT ABD & PELVIS W/CONTRAST: CPT

## 2022-10-07 RX ADMIN — IOPAMIDOL 80 ML: 612 INJECTION, SOLUTION INTRAVENOUS at 12:08

## 2022-12-01 ENCOUNTER — TRANSCRIBE ORDER (OUTPATIENT)
Dept: SCHEDULING | Age: 67
End: 2022-12-01

## 2022-12-01 DIAGNOSIS — C79.9 METASTASIS (HCC): Primary | ICD-10-CM

## 2022-12-02 ENCOUNTER — TRANSCRIBE ORDER (OUTPATIENT)
Dept: SCHEDULING | Age: 67
End: 2022-12-02

## 2022-12-02 DIAGNOSIS — C61 MALIGNANT NEOPLASM OF PROSTATE (HCC): Primary | ICD-10-CM

## 2022-12-05 ENCOUNTER — TRANSCRIBE ORDER (OUTPATIENT)
Dept: SCHEDULING | Age: 67
End: 2022-12-05

## 2022-12-05 DIAGNOSIS — R93.89 ABNORMAL FINDINGS ON EXAMINATION OF BODY STRUCTURE: ICD-10-CM

## 2022-12-05 DIAGNOSIS — R93.0 ABNORMAL FINDINGS ON DIAGNOSTIC IMAGING OF SKULL AND HEAD, NOT ELSEWHERE CLASSIFIED: ICD-10-CM

## 2022-12-05 DIAGNOSIS — C61 MALIGNANT NEOPLASM OF PROSTATE (HCC): Primary | ICD-10-CM

## 2022-12-12 ENCOUNTER — HOSPITAL ENCOUNTER (OUTPATIENT)
Dept: CT IMAGING | Age: 67
Discharge: HOME | End: 2022-12-12
Attending: RADIOLOGY
Payer: MEDICARE

## 2022-12-12 DIAGNOSIS — R93.0 ABNORMAL FINDINGS ON DIAGNOSTIC IMAGING OF SKULL AND HEAD, NOT ELSEWHERE CLASSIFIED: ICD-10-CM

## 2022-12-12 DIAGNOSIS — R93.89 ABNORMAL FINDINGS ON EXAMINATION OF BODY STRUCTURE: ICD-10-CM

## 2022-12-12 DIAGNOSIS — C61 MALIGNANT NEOPLASM OF PROSTATE (HCC): ICD-10-CM

## 2022-12-12 LAB — CREAT UR-MCNC: 1.2 MG/DL (ref 0.6–1.3)

## 2022-12-12 PROCEDURE — 70470 CT HEAD/BRAIN W/O & W/DYE: CPT

## 2022-12-12 PROCEDURE — 74011000636 HC RX REV CODE- 636: Performed by: RADIOLOGY

## 2022-12-12 PROCEDURE — 82565 ASSAY OF CREATININE: CPT

## 2022-12-12 RX ADMIN — IOPAMIDOL 80 ML: 612 INJECTION, SOLUTION INTRAVENOUS at 11:55

## 2023-01-13 ENCOUNTER — TRANSCRIBE ORDER (OUTPATIENT)
Dept: SCHEDULING | Age: 68
End: 2023-01-13

## 2023-01-13 DIAGNOSIS — C61 MALIGNANT NEOPLASM OF PROSTATE (HCC): Primary | ICD-10-CM

## 2023-02-21 ENCOUNTER — HOSPITAL ENCOUNTER (OUTPATIENT)
Facility: HOSPITAL | Age: 68
Discharge: HOME | End: 2023-02-24
Payer: MEDICARE

## 2023-02-21 DIAGNOSIS — C61 MALIGNANT NEOPLASM OF PROSTATE (HCC): ICD-10-CM

## 2023-02-21 LAB — CREAT UR-MCNC: 1.2 MG/DL (ref 0.6–1.3)

## 2023-02-21 PROCEDURE — 2709999900 MRI PELVIS W WO CONTRAST

## 2023-02-21 PROCEDURE — 82565 ASSAY OF CREATININE: CPT

## 2023-02-21 PROCEDURE — 6360000004 HC RX CONTRAST MEDICATION: Performed by: RADIOLOGY

## 2023-02-21 PROCEDURE — A9577 INJ MULTIHANCE: HCPCS | Performed by: RADIOLOGY

## 2023-02-21 RX ADMIN — GADOBENATE DIMEGLUMINE 20 ML: 529 INJECTION, SOLUTION INTRAVENOUS at 16:06

## 2023-05-02 ENCOUNTER — HOSPITAL ENCOUNTER (OUTPATIENT)
Facility: HOSPITAL | Age: 68
Discharge: HOME | End: 2023-05-05

## 2023-05-02 LAB — LABCORP SPECIMEN COLLECTION: NORMAL

## 2023-05-02 PROCEDURE — 99001 SPECIMEN HANDLING PT-LAB: CPT

## 2023-05-15 PROBLEM — C61 MALIGNANT NEOPLASM OF PROSTATE (HCC): Status: ACTIVE | Noted: 2023-05-15

## 2024-01-11 PROBLEM — T45.1X5A ANTINEOPLASTIC CHEMOTHERAPY INDUCED PANCYTOPENIA (HCC): Status: ACTIVE | Noted: 2024-01-11

## 2024-01-11 PROBLEM — C64.9 PRIMARY MALIGNANT NEOPLASM OF KIDNEY (HCC): Status: ACTIVE | Noted: 2019-05-10

## 2024-01-11 PROBLEM — D61.810 ANTINEOPLASTIC CHEMOTHERAPY INDUCED PANCYTOPENIA (HCC): Status: ACTIVE | Noted: 2024-01-11

## 2024-01-11 PROBLEM — C49.A0 GASTROINTESTINAL STROMAL TUMOR (GIST) (HCC): Status: ACTIVE | Noted: 2024-01-11

## 2024-11-13 ENCOUNTER — HOSPITAL ENCOUNTER (OUTPATIENT)
Facility: HOSPITAL | Age: 69
Discharge: HOME | End: 2024-11-16
Attending: INTERNAL MEDICINE
Payer: MEDICARE

## 2024-11-13 DIAGNOSIS — C49.4 LIPOSARCOMA OF STOMACH (HCC): ICD-10-CM

## 2024-11-13 DIAGNOSIS — C64.9 RENAL CELL CANCER, UNSPECIFIED LATERALITY (HCC): ICD-10-CM

## 2024-11-13 LAB — CREAT UR-MCNC: 1.3 MG/DL (ref 0.6–1.3)

## 2024-11-13 PROCEDURE — 71260 CT THORAX DX C+: CPT

## 2024-11-13 PROCEDURE — 82565 ASSAY OF CREATININE: CPT

## 2024-11-13 PROCEDURE — 6360000004 HC RX CONTRAST MEDICATION: Performed by: INTERNAL MEDICINE

## 2024-11-13 PROCEDURE — 2500000003 HC RX 250 WO HCPCS: Performed by: INTERNAL MEDICINE

## 2024-11-13 RX ORDER — IOPAMIDOL 612 MG/ML
100 INJECTION, SOLUTION INTRAVASCULAR
Status: COMPLETED | OUTPATIENT
Start: 2024-11-13 | End: 2024-11-13

## 2024-11-13 RX ADMIN — IOPAMIDOL 100 ML: 612 INJECTION, SOLUTION INTRAVENOUS at 13:45

## 2024-11-13 RX ADMIN — BARIUM SULFATE 900 ML: 20 SUSPENSION ORAL at 13:14

## 2025-05-05 ENCOUNTER — TRANSCRIBE ORDERS (OUTPATIENT)
Facility: HOSPITAL | Age: 70
End: 2025-05-05

## 2025-05-05 DIAGNOSIS — C64.1 MALIGNANT NEOPLASM OF RIGHT KIDNEY, EXCEPT RENAL PELVIS (HCC): ICD-10-CM

## 2025-05-05 DIAGNOSIS — T45.1X5A PANCYTOPENIA DUE TO ANTINEOPLASTIC CHEMOTHERAPY: ICD-10-CM

## 2025-05-05 DIAGNOSIS — D61.810 PANCYTOPENIA DUE TO ANTINEOPLASTIC CHEMOTHERAPY: ICD-10-CM

## 2025-05-05 DIAGNOSIS — C49.4 LIPOSARCOMA OF STOMACH (HCC): Primary | ICD-10-CM

## 2025-06-27 PROBLEM — N28.89 RENAL MASS: Status: ACTIVE | Noted: 2025-06-27

## 2025-06-27 PROBLEM — C49.9 SARCOMA OF SOFT TISSUE (HCC): Status: ACTIVE | Noted: 2025-06-27

## 2025-06-27 PROBLEM — Z12.11 ENCOUNTER FOR SCREENING FOR MALIGNANT NEOPLASM OF COLON: Status: ACTIVE | Noted: 2025-06-27

## 2025-07-01 ENCOUNTER — APPOINTMENT (OUTPATIENT)
Facility: HOSPITAL | Age: 70
DRG: 948 | End: 2025-07-01
Payer: MEDICARE

## 2025-07-01 ENCOUNTER — HOSPITAL ENCOUNTER (INPATIENT)
Facility: HOSPITAL | Age: 70
LOS: 7 days | Discharge: HOME HEALTH | DRG: 948 | End: 2025-07-08
Attending: EMERGENCY MEDICINE | Admitting: INTERNAL MEDICINE
Payer: MEDICARE

## 2025-07-01 DIAGNOSIS — C64.9 METASTATIC RENAL CELL CARCINOMA TO INTRA-ABDOMINAL SITE (HCC): ICD-10-CM

## 2025-07-01 DIAGNOSIS — C79.89 METASTATIC RENAL CELL CARCINOMA TO INTRA-ABDOMINAL SITE (HCC): ICD-10-CM

## 2025-07-01 DIAGNOSIS — R10.9 INTRACTABLE ABDOMINAL PAIN: Primary | ICD-10-CM

## 2025-07-01 DIAGNOSIS — E11.65 TYPE 2 DIABETES MELLITUS WITH HYPERGLYCEMIA, WITHOUT LONG-TERM CURRENT USE OF INSULIN (HCC): ICD-10-CM

## 2025-07-01 PROBLEM — R52 INTRACTABLE PAIN: Status: ACTIVE | Noted: 2025-07-01

## 2025-07-01 LAB
ALBUMIN SERPL-MCNC: 3.5 G/DL (ref 3.4–5)
ALBUMIN/GLOB SERPL: 1.2 (ref 0.8–1.7)
ALP SERPL-CCNC: 61 U/L (ref 45–117)
ALT SERPL-CCNC: 15 U/L (ref 10–50)
ANION GAP SERPL CALC-SCNC: 19 MMOL/L (ref 3–18)
APPEARANCE UR: CLEAR
AST SERPL-CCNC: 29 U/L (ref 10–38)
BACTERIA URNS QL MICRO: ABNORMAL /HPF
BASOPHILS # BLD: 0.01 K/UL (ref 0–0.1)
BASOPHILS NFR BLD: 0.1 % (ref 0–2)
BILIRUB SERPL-MCNC: 0.3 MG/DL (ref 0.2–1)
BILIRUB UR QL: NEGATIVE
BUN SERPL-MCNC: 31 MG/DL (ref 6–23)
BUN/CREAT SERPL: 19 (ref 12–20)
CALCIUM SERPL-MCNC: 9.4 MG/DL (ref 8.5–10.1)
CAOX CRY URNS QL MICRO: ABNORMAL
CHLORIDE SERPL-SCNC: 103 MMOL/L (ref 98–107)
CO2 SERPL-SCNC: 17 MMOL/L (ref 21–32)
COLOR UR: YELLOW
CREAT SERPL-MCNC: 1.64 MG/DL (ref 0.6–1.3)
DIFFERENTIAL METHOD BLD: ABNORMAL
EOSINOPHIL # BLD: 0 K/UL (ref 0–0.4)
EOSINOPHIL NFR BLD: 0 % (ref 0–5)
EPITH CASTS URNS QL MICRO: ABNORMAL /LPF (ref 0–5)
ERYTHROCYTE [DISTWIDTH] IN BLOOD BY AUTOMATED COUNT: 15.3 % (ref 11.6–14.5)
GLOBULIN SER CALC-MCNC: 3 G/DL (ref 2–4)
GLUCOSE BLD STRIP.AUTO-MCNC: 293 MG/DL (ref 70–110)
GLUCOSE BLD STRIP.AUTO-MCNC: 385 MG/DL (ref 70–110)
GLUCOSE BLD STRIP.AUTO-MCNC: 391 MG/DL (ref 70–110)
GLUCOSE SERPL-MCNC: 328 MG/DL (ref 74–108)
GLUCOSE UR STRIP.AUTO-MCNC: >1000 MG/DL
HCT VFR BLD AUTO: 27.3 % (ref 36–48)
HGB BLD-MCNC: 8.7 G/DL (ref 13–16)
HGB UR QL STRIP: ABNORMAL
HYALINE CASTS URNS QL MICRO: ABNORMAL /LPF (ref 0–2)
IMM GRANULOCYTES # BLD AUTO: 0.01 K/UL (ref 0–0.04)
IMM GRANULOCYTES NFR BLD AUTO: 0.1 % (ref 0–0.5)
INR PPP: 1.1 (ref 0.9–1.2)
KETONES UR QL STRIP.AUTO: 15 MG/DL
LEUKOCYTE ESTERASE UR QL STRIP.AUTO: NEGATIVE
LIPASE SERPL-CCNC: 16 U/L (ref 13–75)
LYMPHOCYTES # BLD: 0.57 K/UL (ref 0.9–3.6)
LYMPHOCYTES NFR BLD: 6.7 % (ref 21–52)
MAGNESIUM SERPL-MCNC: 1.8 MG/DL (ref 1.6–2.6)
MCH RBC QN AUTO: 30.9 PG (ref 24–34)
MCHC RBC AUTO-ENTMCNC: 31.9 G/DL (ref 31–37)
MCV RBC AUTO: 96.8 FL (ref 78–100)
MONOCYTES # BLD: 0.35 K/UL (ref 0.05–1.2)
MONOCYTES NFR BLD: 4.1 % (ref 3–10)
MUCOUS THREADS URNS QL MICRO: ABNORMAL /LPF
NEUTS SEG # BLD: 7.51 K/UL (ref 1.8–8)
NEUTS SEG NFR BLD: 89 % (ref 40–73)
NITRITE UR QL STRIP.AUTO: NEGATIVE
NRBC # BLD: 0 K/UL (ref 0–0.01)
NRBC BLD-RTO: 0 PER 100 WBC
PH UR STRIP: 5.5 (ref 5–8)
PLATELET # BLD AUTO: 227 K/UL (ref 135–420)
PMV BLD AUTO: 11.1 FL (ref 9.2–11.8)
POTASSIUM SERPL-SCNC: 5.2 MMOL/L (ref 3.5–5.5)
PROT SERPL-MCNC: 6.5 G/DL (ref 6.4–8.2)
PROT UR STRIP-MCNC: ABNORMAL MG/DL
PROTHROMBIN TIME: 14.2 SEC (ref 12–15.1)
RBC # BLD AUTO: 2.82 M/UL (ref 4.35–5.65)
RBC #/AREA URNS HPF: ABNORMAL /HPF (ref 0–5)
SODIUM SERPL-SCNC: 139 MMOL/L (ref 136–145)
SP GR UR REFRACTOMETRY: 1.03 (ref 1–1.03)
TROPONIN T SERPL HS-MCNC: 9.7 NG/L (ref 0–22)
TROPONIN T SERPL HS-MCNC: <6 NG/L (ref 0–22)
TSH, 3RD GENERATION: 0.5 UIU/ML (ref 0.27–4.2)
UROBILINOGEN UR QL STRIP.AUTO: 0.2 EU/DL (ref 0.2–1)
WBC # BLD AUTO: 8.5 K/UL (ref 4.6–13.2)
WBC URNS QL MICRO: ABNORMAL /HPF (ref 0–5)

## 2025-07-01 PROCEDURE — 83735 ASSAY OF MAGNESIUM: CPT

## 2025-07-01 PROCEDURE — 80053 COMPREHEN METABOLIC PANEL: CPT

## 2025-07-01 PROCEDURE — 96375 TX/PRO/DX INJ NEW DRUG ADDON: CPT

## 2025-07-01 PROCEDURE — 99285 EMERGENCY DEPT VISIT HI MDM: CPT

## 2025-07-01 PROCEDURE — 6360000002 HC RX W HCPCS

## 2025-07-01 PROCEDURE — 83690 ASSAY OF LIPASE: CPT

## 2025-07-01 PROCEDURE — 6360000002 HC RX W HCPCS: Performed by: EMERGENCY MEDICINE

## 2025-07-01 PROCEDURE — 99222 1ST HOSP IP/OBS MODERATE 55: CPT | Performed by: INTERNAL MEDICINE

## 2025-07-01 PROCEDURE — 93005 ELECTROCARDIOGRAM TRACING: CPT | Performed by: EMERGENCY MEDICINE

## 2025-07-01 PROCEDURE — 82962 GLUCOSE BLOOD TEST: CPT

## 2025-07-01 PROCEDURE — 84484 ASSAY OF TROPONIN QUANT: CPT

## 2025-07-01 PROCEDURE — 6360000004 HC RX CONTRAST MEDICATION: Performed by: EMERGENCY MEDICINE

## 2025-07-01 PROCEDURE — 74177 CT ABD & PELVIS W/CONTRAST: CPT

## 2025-07-01 PROCEDURE — 85025 COMPLETE CBC W/AUTO DIFF WBC: CPT

## 2025-07-01 PROCEDURE — 84443 ASSAY THYROID STIM HORMONE: CPT

## 2025-07-01 PROCEDURE — 6370000000 HC RX 637 (ALT 250 FOR IP): Performed by: INTERNAL MEDICINE

## 2025-07-01 PROCEDURE — 2580000003 HC RX 258: Performed by: EMERGENCY MEDICINE

## 2025-07-01 PROCEDURE — 96374 THER/PROPH/DIAG INJ IV PUSH: CPT

## 2025-07-01 PROCEDURE — 85610 PROTHROMBIN TIME: CPT

## 2025-07-01 PROCEDURE — 81001 URINALYSIS AUTO W/SCOPE: CPT

## 2025-07-01 PROCEDURE — 1100000000 HC RM PRIVATE

## 2025-07-01 PROCEDURE — 96376 TX/PRO/DX INJ SAME DRUG ADON: CPT

## 2025-07-01 RX ORDER — POTASSIUM CHLORIDE 1500 MG/1
40 TABLET, EXTENDED RELEASE ORAL PRN
Status: DISCONTINUED | OUTPATIENT
Start: 2025-07-01 | End: 2025-07-08 | Stop reason: HOSPADM

## 2025-07-01 RX ORDER — MORPHINE SULFATE 4 MG/ML
4 INJECTION, SOLUTION INTRAMUSCULAR; INTRAVENOUS
Status: COMPLETED | OUTPATIENT
Start: 2025-07-01 | End: 2025-07-01

## 2025-07-01 RX ORDER — 0.9 % SODIUM CHLORIDE 0.9 %
1000 INTRAVENOUS SOLUTION INTRAVENOUS ONCE
Status: COMPLETED | OUTPATIENT
Start: 2025-07-01 | End: 2025-07-01

## 2025-07-01 RX ORDER — ONDANSETRON 2 MG/ML
4 INJECTION INTRAMUSCULAR; INTRAVENOUS EVERY 6 HOURS PRN
Status: DISCONTINUED | OUTPATIENT
Start: 2025-07-01 | End: 2025-07-08 | Stop reason: HOSPADM

## 2025-07-01 RX ORDER — ONDANSETRON 2 MG/ML
4 INJECTION INTRAMUSCULAR; INTRAVENOUS ONCE
Status: COMPLETED | OUTPATIENT
Start: 2025-07-01 | End: 2025-07-01

## 2025-07-01 RX ORDER — POLYETHYLENE GLYCOL 3350 17 G/17G
17 POWDER, FOR SOLUTION ORAL DAILY PRN
Status: DISCONTINUED | OUTPATIENT
Start: 2025-07-01 | End: 2025-07-08 | Stop reason: HOSPADM

## 2025-07-01 RX ORDER — INSULIN LISPRO 100 [IU]/ML
4 INJECTION, SOLUTION INTRAVENOUS; SUBCUTANEOUS ONCE
Status: COMPLETED | OUTPATIENT
Start: 2025-07-01 | End: 2025-07-01

## 2025-07-01 RX ORDER — POTASSIUM CHLORIDE 7.45 MG/ML
10 INJECTION INTRAVENOUS PRN
Status: DISCONTINUED | OUTPATIENT
Start: 2025-07-01 | End: 2025-07-08 | Stop reason: HOSPADM

## 2025-07-01 RX ORDER — DEXTROSE MONOHYDRATE 100 MG/ML
INJECTION, SOLUTION INTRAVENOUS CONTINUOUS PRN
Status: DISCONTINUED | OUTPATIENT
Start: 2025-07-01 | End: 2025-07-08 | Stop reason: HOSPADM

## 2025-07-01 RX ORDER — SODIUM CHLORIDE 9 MG/ML
INJECTION, SOLUTION INTRAVENOUS PRN
Status: DISCONTINUED | OUTPATIENT
Start: 2025-07-01 | End: 2025-07-08 | Stop reason: HOSPADM

## 2025-07-01 RX ORDER — IOPAMIDOL 612 MG/ML
100 INJECTION, SOLUTION INTRAVASCULAR
Status: COMPLETED | OUTPATIENT
Start: 2025-07-01 | End: 2025-07-01

## 2025-07-01 RX ORDER — ENOXAPARIN SODIUM 100 MG/ML
40 INJECTION SUBCUTANEOUS DAILY
Status: DISCONTINUED | OUTPATIENT
Start: 2025-07-02 | End: 2025-07-02

## 2025-07-01 RX ORDER — ACETAMINOPHEN 650 MG/1
650 SUPPOSITORY RECTAL EVERY 6 HOURS PRN
Status: DISCONTINUED | OUTPATIENT
Start: 2025-07-01 | End: 2025-07-08 | Stop reason: HOSPADM

## 2025-07-01 RX ORDER — LORAZEPAM 2 MG/ML
0.5 INJECTION INTRAMUSCULAR EVERY 6 HOURS PRN
Status: DISCONTINUED | OUTPATIENT
Start: 2025-07-01 | End: 2025-07-08 | Stop reason: HOSPADM

## 2025-07-01 RX ORDER — ONDANSETRON 4 MG/1
4 TABLET, ORALLY DISINTEGRATING ORAL EVERY 8 HOURS PRN
Status: DISCONTINUED | OUTPATIENT
Start: 2025-07-01 | End: 2025-07-08 | Stop reason: HOSPADM

## 2025-07-01 RX ORDER — HYDROMORPHONE HYDROCHLORIDE 1 MG/ML
1 INJECTION, SOLUTION INTRAMUSCULAR; INTRAVENOUS; SUBCUTANEOUS EVERY 4 HOURS PRN
Status: DISCONTINUED | OUTPATIENT
Start: 2025-07-01 | End: 2025-07-08 | Stop reason: HOSPADM

## 2025-07-01 RX ORDER — SODIUM CHLORIDE 0.9 % (FLUSH) 0.9 %
5-40 SYRINGE (ML) INJECTION PRN
Status: DISCONTINUED | OUTPATIENT
Start: 2025-07-01 | End: 2025-07-08 | Stop reason: HOSPADM

## 2025-07-01 RX ORDER — ACETAMINOPHEN 325 MG/1
650 TABLET ORAL EVERY 6 HOURS PRN
Status: DISCONTINUED | OUTPATIENT
Start: 2025-07-01 | End: 2025-07-08 | Stop reason: HOSPADM

## 2025-07-01 RX ORDER — MORPHINE SULFATE 4 MG/ML
4 INJECTION, SOLUTION INTRAMUSCULAR; INTRAVENOUS
Refills: 0 | Status: COMPLETED | OUTPATIENT
Start: 2025-07-01 | End: 2025-07-01

## 2025-07-01 RX ORDER — SODIUM CHLORIDE 0.9 % (FLUSH) 0.9 %
5-40 SYRINGE (ML) INJECTION EVERY 12 HOURS SCHEDULED
Status: DISCONTINUED | OUTPATIENT
Start: 2025-07-01 | End: 2025-07-08 | Stop reason: HOSPADM

## 2025-07-01 RX ORDER — INSULIN LISPRO 100 [IU]/ML
0-4 INJECTION, SOLUTION INTRAVENOUS; SUBCUTANEOUS
Status: DISCONTINUED | OUTPATIENT
Start: 2025-07-01 | End: 2025-07-02

## 2025-07-01 RX ORDER — MAGNESIUM SULFATE IN WATER 40 MG/ML
2000 INJECTION, SOLUTION INTRAVENOUS PRN
Status: DISCONTINUED | OUTPATIENT
Start: 2025-07-01 | End: 2025-07-08 | Stop reason: HOSPADM

## 2025-07-01 RX ADMIN — INSULIN LISPRO 4 UNITS: 100 INJECTION, SOLUTION INTRAVENOUS; SUBCUTANEOUS at 23:29

## 2025-07-01 RX ADMIN — MORPHINE SULFATE 4 MG: 4 INJECTION, SOLUTION INTRAMUSCULAR; INTRAVENOUS at 21:40

## 2025-07-01 RX ADMIN — ONDANSETRON 4 MG: 2 INJECTION, SOLUTION INTRAMUSCULAR; INTRAVENOUS at 16:04

## 2025-07-01 RX ADMIN — INSULIN LISPRO 4 UNITS: 100 INJECTION, SOLUTION INTRAVENOUS; SUBCUTANEOUS at 22:41

## 2025-07-01 RX ADMIN — MORPHINE SULFATE 4 MG: 4 INJECTION, SOLUTION INTRAMUSCULAR; INTRAVENOUS at 16:04

## 2025-07-01 RX ADMIN — MORPHINE SULFATE 4 MG: 4 INJECTION, SOLUTION INTRAMUSCULAR; INTRAVENOUS at 17:56

## 2025-07-01 RX ADMIN — SODIUM CHLORIDE 1000 ML: 9 INJECTION, SOLUTION INTRAVENOUS at 16:03

## 2025-07-01 RX ADMIN — IOPAMIDOL 80 ML: 612 INJECTION, SOLUTION INTRAVENOUS at 18:09

## 2025-07-01 ASSESSMENT — PAIN SCALES - GENERAL
PAINLEVEL_OUTOF10: 7
PAINLEVEL_OUTOF10: 10
PAINLEVEL_OUTOF10: 7

## 2025-07-01 ASSESSMENT — PAIN DESCRIPTION - LOCATION: LOCATION: ABDOMEN

## 2025-07-01 NOTE — ED TRIAGE NOTES
Pt brought in via EMS with c/o of abd pain. Dx with \"stomach cancer\" in April. Pt wife tried to take to hospital but pt wasn't able to get into car.     No vomiting or diarrhea.         20g LAC in EMS

## 2025-07-01 NOTE — ED PROVIDER NOTES
is less than 6 initially, with a repeat of 9.7. Urinalysis shows glucose greater than 1,000.    X Ray Interpretation (interpreted by me):     CT scan of the abdomen and pelvis with contrast reveals a large heterogeneous mass along the left aspect of the stomach, significant mass effect, a smaller mass inferior to the stomach, a suspicious mass on the right kidney, and a new suspicious mass in the right adrenal gland.    Patient History and Social Determinants:    Historians other than the patient:     The patient's wife provided additional history regarding the patient's recent medication changes and decision-making capacity.    Care significantly affected by the following chronic Conditions:     Renal cancer, prostate cancer, schizophrenia, hypertension, type 2 diabetes, BPH.    Management:    Medications:     The patient took Tylenol earlier today for pain.    Re-Evaluations/Course of Care:     The patient was evaluated for severe abdominal pain and worsening metastatic disease. Plans for admission were made, and the case was discussed with the hospitalist for further management.    Potential for Clinical Deterioration:     Yes, given the patient's diagnosis of worsening metastatic renal cell carcinoma and intractable abdominal pain, there is a risk for clinical deterioration. The patient will be monitored closely during admission.    Dispo:     The patient was admitted for further management of intractable abdominal pain and worsening metastatic renal cell carcinoma.    High Risk of Morbidity/Mortality Consideration:     Yes, the patient's condition is concerning for life-threatening complications due to metastatic renal cell carcinoma and potential gastrointestinal obstruction.      ED Course as of 07/01/25 2355   Tue Jul 01, 2025   1512 EKG performed at 3:07 PM  Rhythm: normal sinus rhythm; and regular . Rate (approx.): 98; Axis: normal; ; QRS interval: normal ; ST/T wave: no acute ST/T wave changes; Other

## 2025-07-01 NOTE — ED NOTES
Assumed care of pt. Pt placed back on monitor. Pt verbalized no concerns at this time. Pt and pt wife provided mouth swabs per request.

## 2025-07-02 ENCOUNTER — APPOINTMENT (OUTPATIENT)
Facility: HOSPITAL | Age: 70
DRG: 948 | End: 2025-07-02
Payer: MEDICARE

## 2025-07-02 PROBLEM — C64.9 METASTATIC RENAL CELL CARCINOMA TO INTRA-ABDOMINAL SITE (HCC): Status: ACTIVE | Noted: 2025-07-02

## 2025-07-02 PROBLEM — R10.9 INTRACTABLE ABDOMINAL PAIN: Status: ACTIVE | Noted: 2025-07-02

## 2025-07-02 PROBLEM — C79.89 METASTATIC RENAL CELL CARCINOMA TO INTRA-ABDOMINAL SITE (HCC): Status: ACTIVE | Noted: 2025-07-02

## 2025-07-02 PROBLEM — Z90.3 HISTORY OF GASTRECTOMY: Status: ACTIVE | Noted: 2025-07-02

## 2025-07-02 PROBLEM — D64.9 ANEMIA: Status: ACTIVE | Noted: 2025-07-02

## 2025-07-02 PROBLEM — Z90.5 HISTORY OF PARTIAL NEPHRECTOMY: Status: ACTIVE | Noted: 2025-07-02

## 2025-07-02 PROBLEM — Z51.5 ENCOUNTER FOR PALLIATIVE CARE: Status: ACTIVE | Noted: 2025-07-02

## 2025-07-02 PROBLEM — Z71.89 GOALS OF CARE, COUNSELING/DISCUSSION: Status: ACTIVE | Noted: 2025-07-02

## 2025-07-02 LAB
ALBUMIN SERPL-MCNC: 3.2 G/DL (ref 3.4–5)
ALBUMIN/GLOB SERPL: 1.1 (ref 0.8–1.7)
ALP SERPL-CCNC: 52 U/L (ref 45–117)
ALT SERPL-CCNC: 70 U/L (ref 10–50)
ANION GAP SERPL CALC-SCNC: 11 MMOL/L (ref 3–18)
AST SERPL-CCNC: 63 U/L (ref 10–38)
BASOPHILS # BLD: 0 K/UL (ref 0–0.1)
BASOPHILS NFR BLD: 0 % (ref 0–2)
BILIRUB SERPL-MCNC: <0.2 MG/DL (ref 0.2–1)
BUN SERPL-MCNC: 44 MG/DL (ref 6–23)
BUN/CREAT SERPL: 18 (ref 12–20)
CALCIUM SERPL-MCNC: 9.1 MG/DL (ref 8.5–10.1)
CHLORIDE SERPL-SCNC: 104 MMOL/L (ref 98–107)
CO2 SERPL-SCNC: 21 MMOL/L (ref 21–32)
CREAT SERPL-MCNC: 2.46 MG/DL (ref 0.6–1.3)
DIFFERENTIAL METHOD BLD: ABNORMAL
EKG ATRIAL RATE: 98 BPM
EKG DIAGNOSIS: NORMAL
EKG P AXIS: 78 DEGREES
EKG P-R INTERVAL: 144 MS
EKG Q-T INTERVAL: 364 MS
EKG QRS DURATION: 82 MS
EKG QTC CALCULATION (BAZETT): 464 MS
EKG R AXIS: 86 DEGREES
EKG T AXIS: 65 DEGREES
EKG VENTRICULAR RATE: 98 BPM
EOSINOPHIL # BLD: 0 K/UL (ref 0–0.4)
EOSINOPHIL NFR BLD: 0 % (ref 0–5)
ERYTHROCYTE [DISTWIDTH] IN BLOOD BY AUTOMATED COUNT: 14.9 % (ref 11.6–14.5)
GLOBULIN SER CALC-MCNC: 3 G/DL (ref 2–4)
GLUCOSE BLD STRIP.AUTO-MCNC: 262 MG/DL (ref 70–110)
GLUCOSE BLD STRIP.AUTO-MCNC: 284 MG/DL (ref 70–110)
GLUCOSE BLD STRIP.AUTO-MCNC: 329 MG/DL (ref 70–110)
GLUCOSE BLD STRIP.AUTO-MCNC: 348 MG/DL (ref 70–110)
GLUCOSE BLD STRIP.AUTO-MCNC: 350 MG/DL (ref 70–110)
GLUCOSE SERPL-MCNC: 329 MG/DL (ref 74–108)
HCT VFR BLD AUTO: 19.7 % (ref 36–48)
HGB BLD-MCNC: 6.6 G/DL (ref 13–16)
HISTORY CHECK: NORMAL
IMM GRANULOCYTES # BLD AUTO: 0.04 K/UL (ref 0–0.04)
IMM GRANULOCYTES NFR BLD AUTO: 0.5 % (ref 0–0.5)
LYMPHOCYTES # BLD: 0.71 K/UL (ref 0.9–3.6)
LYMPHOCYTES NFR BLD: 8 % (ref 21–52)
MCH RBC QN AUTO: 31 PG (ref 24–34)
MCHC RBC AUTO-ENTMCNC: 33.5 G/DL (ref 31–37)
MCV RBC AUTO: 92.5 FL (ref 78–100)
MONOCYTES # BLD: 0.74 K/UL (ref 0.05–1.2)
MONOCYTES NFR BLD: 8.4 % (ref 3–10)
NEUTS SEG # BLD: 7.34 K/UL (ref 1.8–8)
NEUTS SEG NFR BLD: 83.1 % (ref 40–73)
NRBC # BLD: 0 K/UL (ref 0–0.01)
NRBC BLD-RTO: 0 PER 100 WBC
PLATELET # BLD AUTO: 179 K/UL (ref 135–420)
PMV BLD AUTO: 10.2 FL (ref 9.2–11.8)
POTASSIUM SERPL-SCNC: 5.3 MMOL/L (ref 3.5–5.5)
PROT SERPL-MCNC: 6.2 G/DL (ref 6.4–8.2)
RBC # BLD AUTO: 2.13 M/UL (ref 4.35–5.65)
SODIUM SERPL-SCNC: 136 MMOL/L (ref 136–145)
WBC # BLD AUTO: 8.8 K/UL (ref 4.6–13.2)

## 2025-07-02 PROCEDURE — 36415 COLL VENOUS BLD VENIPUNCTURE: CPT

## 2025-07-02 PROCEDURE — 99232 SBSQ HOSP IP/OBS MODERATE 35: CPT

## 2025-07-02 PROCEDURE — 86901 BLOOD TYPING SEROLOGIC RH(D): CPT

## 2025-07-02 PROCEDURE — 82962 GLUCOSE BLOOD TEST: CPT

## 2025-07-02 PROCEDURE — 76705 ECHO EXAM OF ABDOMEN: CPT

## 2025-07-02 PROCEDURE — 86923 COMPATIBILITY TEST ELECTRIC: CPT

## 2025-07-02 PROCEDURE — 80053 COMPREHEN METABOLIC PANEL: CPT

## 2025-07-02 PROCEDURE — 99223 1ST HOSP IP/OBS HIGH 75: CPT

## 2025-07-02 PROCEDURE — 2500000003 HC RX 250 WO HCPCS: Performed by: INTERNAL MEDICINE

## 2025-07-02 PROCEDURE — 6360000002 HC RX W HCPCS: Performed by: INTERNAL MEDICINE

## 2025-07-02 PROCEDURE — 1100000003 HC PRIVATE W/ TELEMETRY

## 2025-07-02 PROCEDURE — 86850 RBC ANTIBODY SCREEN: CPT

## 2025-07-02 PROCEDURE — 97161 PT EVAL LOW COMPLEX 20 MIN: CPT

## 2025-07-02 PROCEDURE — 85025 COMPLETE CBC W/AUTO DIFF WBC: CPT

## 2025-07-02 PROCEDURE — 97530 THERAPEUTIC ACTIVITIES: CPT

## 2025-07-02 PROCEDURE — 6370000000 HC RX 637 (ALT 250 FOR IP): Performed by: INTERNAL MEDICINE

## 2025-07-02 PROCEDURE — 93010 ELECTROCARDIOGRAM REPORT: CPT | Performed by: INTERNAL MEDICINE

## 2025-07-02 PROCEDURE — 86900 BLOOD TYPING SEROLOGIC ABO: CPT

## 2025-07-02 RX ORDER — SODIUM CHLORIDE 9 MG/ML
INJECTION, SOLUTION INTRAVENOUS PRN
Status: DISCONTINUED | OUTPATIENT
Start: 2025-07-02 | End: 2025-07-05

## 2025-07-02 RX ORDER — INSULIN GLARGINE 100 [IU]/ML
5 INJECTION, SOLUTION SUBCUTANEOUS NIGHTLY
Status: DISCONTINUED | OUTPATIENT
Start: 2025-07-02 | End: 2025-07-08 | Stop reason: HOSPADM

## 2025-07-02 RX ORDER — INSULIN LISPRO 100 [IU]/ML
0-8 INJECTION, SOLUTION INTRAVENOUS; SUBCUTANEOUS
Status: DISCONTINUED | OUTPATIENT
Start: 2025-07-02 | End: 2025-07-08 | Stop reason: HOSPADM

## 2025-07-02 RX ORDER — ENOXAPARIN SODIUM 100 MG/ML
30 INJECTION SUBCUTANEOUS DAILY
Status: DISCONTINUED | OUTPATIENT
Start: 2025-07-03 | End: 2025-07-06

## 2025-07-02 RX ADMIN — INSULIN LISPRO 8 UNITS: 100 INJECTION, SOLUTION INTRAVENOUS; SUBCUTANEOUS at 08:53

## 2025-07-02 RX ADMIN — INSULIN GLARGINE 5 UNITS: 100 INJECTION, SOLUTION SUBCUTANEOUS at 06:55

## 2025-07-02 RX ADMIN — SODIUM CHLORIDE, PRESERVATIVE FREE 10 ML: 5 INJECTION INTRAVENOUS at 04:33

## 2025-07-02 RX ADMIN — SODIUM CHLORIDE, PRESERVATIVE FREE 10 ML: 5 INJECTION INTRAVENOUS at 08:54

## 2025-07-02 RX ADMIN — HYDROMORPHONE HYDROCHLORIDE 1 MG: 1 INJECTION, SOLUTION INTRAMUSCULAR; INTRAVENOUS; SUBCUTANEOUS at 04:26

## 2025-07-02 RX ADMIN — INSULIN LISPRO 4 UNITS: 100 INJECTION, SOLUTION INTRAVENOUS; SUBCUTANEOUS at 12:29

## 2025-07-02 ASSESSMENT — PAIN SCALES - GENERAL
PAINLEVEL_OUTOF10: 0
PAINLEVEL_OUTOF10: 7
PAINLEVEL_OUTOF10: 0

## 2025-07-02 ASSESSMENT — PAIN DESCRIPTION - LOCATION
LOCATION: ABDOMEN
LOCATION: ABDOMEN

## 2025-07-02 ASSESSMENT — PAIN DESCRIPTION - ONSET: ONSET: ON-GOING

## 2025-07-02 ASSESSMENT — PAIN DESCRIPTION - DESCRIPTORS: DESCRIPTORS: ACHING

## 2025-07-02 ASSESSMENT — PAIN DESCRIPTION - DIRECTION: RADIATING_TOWARDS: NO

## 2025-07-02 ASSESSMENT — PAIN DESCRIPTION - FREQUENCY: FREQUENCY: CONTINUOUS

## 2025-07-02 ASSESSMENT — PAIN DESCRIPTION - PAIN TYPE: TYPE: INTRACTABLE PAIN

## 2025-07-02 ASSESSMENT — PAIN - FUNCTIONAL ASSESSMENT: PAIN_FUNCTIONAL_ASSESSMENT: PREVENTS OR INTERFERES SOME ACTIVE ACTIVITIES AND ADLS

## 2025-07-02 ASSESSMENT — PAIN DESCRIPTION - ORIENTATION: ORIENTATION: MID

## 2025-07-02 NOTE — ED NOTES
TRANSFER - OUT REPORT:    Verbal report given to TRAE Ahn on John Juarez  being transferred to [facility] for routine progression of patient care       Report consisted of patient's Situation, Background, Assessment and   Recommendations(SBAR).     Information from the following report(s) Nurse Handoff Report, ED Encounter Summary, ED SBAR, MAR, and Neuro Assessment was reviewed with the receiving nurse.        Lines:   Peripheral IV 07/01/25 Left Antecubital (Active)        Opportunity for questions and clarification was provided.      Patient transported with:  Tech

## 2025-07-02 NOTE — CONSULTS
Interventional Radiology     Consult received from Dr. Fields for evaluation of ascites.    No IR consult needed for paracentesis, thoracentesis, PICC line, or LP needed.    Please place the order and any specimens you want collected and radiology staff will schedule procedure      Thank you,  HARINI Kilgore  [phone]

## 2025-07-02 NOTE — CONSULTS
Palliative Medicine Initial Consult  [facility]: [phone]  [facility]: [phone]  [facility]: [phone]   Patient Name: John Juarez  YOB: 1955  MRN: [id number]  Age: 70 y.o.  Gender: male    Date of Initial Consult: 7/2/2025  Date of Service: 7/2/2025  Time: 2:25 PM  [facility]  H[facility] Day: 2  Admit Date: 7/1/2025  Referring Provider: Dr. Fields       Reasons for Consultation:  Goals of Care and Other: patient and family support    HISTORY OF PRESENT ILLNESS (HPI):   John Juarez is a 70 y.o. male with a past medical history of schizophrenia, HTN, metastatic gastrointestinal stromal tumor, prostate cancer, renal cell carcinoma with partial nephrectomy and gastric wedge resection of GIST 5/2019, who was admitted on 7/1/2025 from home via EMS with a diagnosis of intractable pain. Per notes the patient had been doing well earlier in week but began having abdominal pain and was not eating. His pain was severe enough to come to the hospital. In the ED a CT abdomen and pelvis was done which showed enlargement of mass up to 15.7 cm located along the left aspect of the stomach causing significant mass effect on the stomach and abdominal viscera., with large volume ascites also contributing to mass effect on the liver, along with suspicious masses on the right kidney and right adrenal gland. Labs in ED showed BUN/Cr 31/1.64, H/H 8.7/27.3. Per notes overnight the patient's kidney function and hemoglobin worsened. Radiology was consulted for potential paracentesis of ascites fluid but patient has trace ascites and no safe window for access of fluid per their notes. Palliative care was consulted for goals of care.    Psychosocial: Patient lives with his wife Savita. He has two stepdaughters named Vani and Shira, along with a niece  who are supportive. Patient has a history of schizophrenia.     7/2/2025 Patient

## 2025-07-02 NOTE — CONSENT
Informed Consent for Blood Component Transfusion Note    I have discussed with the patient the rationale for blood component transfusion; its benefits in treating or preventing fatigue, organ damage, or death; and its risk which includes mild transfusion reactions, rare risk of blood borne infection, or more serious but rare reactions. I have discussed the alternatives to transfusion, including the risk and consequences of not receiving transfusion. The patient had an opportunity to ask questions and had agreed to proceed with transfusion of blood components.    Electronically signed by LORI Llanos NP on 7/2/25 at 12:31 PM EDT

## 2025-07-02 NOTE — ACP (ADVANCE CARE PLANNING)
Advance Care Planning       Palliative Team Advance Care Planning (ACP) Conversation        Date of Conversation: 07/02/25      Individuals present for the conversation: Patient, patient's wife (Savita Juarez), Angeles Peck NP (Palliative) and this writer.        ACP documents on file prior to discussion:  -None      Previously completed document/s not on file: Patient / participant reports that there are no previously executed ACP documents.      Healthcare Decision Maker:     Patient does not have a formal healthcare decision maker document, on file. Patient's wife (Savita Juarez, Ph#[phone] and Ph#[phone]) is patient's legal next-of-kin and default healthcare decision maker. Wife accepts the roll of decision maker and patient is okay with his wife making his medical decision; if he cannot.       Conversation Summary:      This writer, along with Angeles Peck NP, with the Palliative Care team; visited with patient today to offer support and also address healthcare decision maker(s) and discuss goals of care moving forward. Patient was laying in bed; alert and oriented with some periods of confusion. There were no family members at the bedside. Patient was experiencing some pain in his abdomen. Patient was admitted to the hospital with intractable pain in the abdomin. Patient has a history of Cancer and there is a mass in his abdomin.     Patient resides with his wife (Savita), who is his main caregiver. Patient reported to being independent with his ADLs and IADLs. He also still drives. Patient plans to return home, once medically stable for discharge. Patient was asked about healthcare decision maker(s). Patient does not have a formal healthcare decision maker document, on file. Patient reported that he has never completed a healthcare decision maker document. Patient's wife is his legal next-of-kin and default healthcare decision maker. Patient is okay with his wife being his decision maker,

## 2025-07-02 NOTE — ED NOTES
Cardiac monitor in place. Pt voiced no concerns at this time. Pt awake, alert and orientated. Allergies verified. Medicated as per MAR. Resperations even and unlabored.

## 2025-07-02 NOTE — H&P
Hospitalist Admission History and Physical    NAME:  John Juarez   :   1955   MRN:   [id number]     PCP:  Jose Grimes MD  Date/Time:  2025 11:59 PM  Subjective:   CHIEF COMPLAINT:      HISTORY OF PRESENT ILLNESS:     John is a 70 y.o.   male who presents with intractable abdominal pain secondary to GIST.  The patient is followed by oncology and has history of prostate cancer, renal cell carcinoma with partial nephrectomy, previous GIST.  The patient and his wife provide the following history.  Patient states that he had been doing well earlier in the week and was seen by his oncologist on Monday.  His wife states that at that time he did not have any complaints of pain.  She did note yesterday evening he did not eat a lot and this morning she fixed breakfast but the patient was not eating.  He finally acknowledged that he was having abdominal pain and later in the day it worsened to the point that they came to the ED for further evaluation.    The patient states that he has not had pain like this in the past and therefore has not been on any pain medication.  His oncologist had prescribed a new medication Stivarga.  However the patient was concerned about side effects and therefore did not start the medication.    Past medical history significant for schizophrenia, prostate cancer, renal cell carcinoma with nephrectomy, type 2 diabetes, BPH, and hypertension.    The patient states that he does not have an appetite and rates his abdominal pain as 10 out of 10.    Patient's hematologist was consulted from ED and recommended patient be admitted for pain management and the oncologist will see him in the morning.        Past Medical History:   Diagnosis Date    Blister of foot or toe     BPH (benign prostatic hyperplasia)     DM w/o complication type II (HCC)     Elevated prostate specific antigen (PSA)     Hypertension     Lower urinary tract symptoms (LUTS)     Nodular prostate

## 2025-07-03 PROBLEM — F20.0 PARANOID SCHIZOPHRENIA (HCC): Status: ACTIVE | Noted: 2018-11-07

## 2025-07-03 LAB
ANION GAP SERPL CALC-SCNC: 14 MMOL/L (ref 3–18)
BASOPHILS # BLD: 0 K/UL (ref 0–0.1)
BASOPHILS NFR BLD: 0 % (ref 0–2)
BUN SERPL-MCNC: 73 MG/DL (ref 6–23)
BUN/CREAT SERPL: 23 (ref 12–20)
CALCIUM SERPL-MCNC: 9.4 MG/DL (ref 8.5–10.1)
CHLORIDE SERPL-SCNC: 102 MMOL/L (ref 98–107)
CO2 SERPL-SCNC: 21 MMOL/L (ref 21–32)
CREAT SERPL-MCNC: 3.23 MG/DL (ref 0.6–1.3)
DIFFERENTIAL METHOD BLD: ABNORMAL
EOSINOPHIL # BLD: 0.01 K/UL (ref 0–0.4)
EOSINOPHIL NFR BLD: 0.1 % (ref 0–5)
ERYTHROCYTE [DISTWIDTH] IN BLOOD BY AUTOMATED COUNT: 15.5 % (ref 11.6–14.5)
GLUCOSE BLD STRIP.AUTO-MCNC: 147 MG/DL (ref 70–110)
GLUCOSE BLD STRIP.AUTO-MCNC: 254 MG/DL (ref 70–110)
GLUCOSE BLD STRIP.AUTO-MCNC: 313 MG/DL (ref 70–110)
GLUCOSE SERPL-MCNC: 224 MG/DL (ref 74–108)
HCT VFR BLD AUTO: 14.2 % (ref 36–48)
HGB BLD-MCNC: 4.8 G/DL (ref 13–16)
IMM GRANULOCYTES # BLD AUTO: 0.11 K/UL (ref 0–0.04)
IMM GRANULOCYTES NFR BLD AUTO: 0.9 % (ref 0–0.5)
LYMPHOCYTES # BLD: 0.81 K/UL (ref 0.9–3.6)
LYMPHOCYTES NFR BLD: 7 % (ref 21–52)
MCH RBC QN AUTO: 31.6 PG (ref 24–34)
MCHC RBC AUTO-ENTMCNC: 33.8 G/DL (ref 31–37)
MCV RBC AUTO: 93.4 FL (ref 78–100)
MONOCYTES # BLD: 1.18 K/UL (ref 0.05–1.2)
MONOCYTES NFR BLD: 10.1 % (ref 3–10)
NEUTS SEG # BLD: 9.54 K/UL (ref 1.8–8)
NEUTS SEG NFR BLD: 81.9 % (ref 40–73)
NRBC # BLD: 0.02 K/UL (ref 0–0.01)
NRBC BLD-RTO: 0.2 PER 100 WBC
PLATELET # BLD AUTO: 174 K/UL (ref 135–420)
PMV BLD AUTO: 10.4 FL (ref 9.2–11.8)
POTASSIUM SERPL-SCNC: 5.1 MMOL/L (ref 3.5–5.5)
RBC # BLD AUTO: 1.52 M/UL (ref 4.35–5.65)
SODIUM SERPL-SCNC: 138 MMOL/L (ref 136–145)
WBC # BLD AUTO: 11.7 K/UL (ref 4.6–13.2)

## 2025-07-03 PROCEDURE — 1100000003 HC PRIVATE W/ TELEMETRY

## 2025-07-03 PROCEDURE — 2500000003 HC RX 250 WO HCPCS: Performed by: INTERNAL MEDICINE

## 2025-07-03 PROCEDURE — 90792 PSYCH DIAG EVAL W/MED SRVCS: CPT | Performed by: PSYCHIATRY & NEUROLOGY

## 2025-07-03 PROCEDURE — 80048 BASIC METABOLIC PNL TOTAL CA: CPT

## 2025-07-03 PROCEDURE — 6360000002 HC RX W HCPCS: Performed by: INTERNAL MEDICINE

## 2025-07-03 PROCEDURE — 36415 COLL VENOUS BLD VENIPUNCTURE: CPT

## 2025-07-03 PROCEDURE — 82962 GLUCOSE BLOOD TEST: CPT

## 2025-07-03 PROCEDURE — 6370000000 HC RX 637 (ALT 250 FOR IP): Performed by: INTERNAL MEDICINE

## 2025-07-03 PROCEDURE — 99232 SBSQ HOSP IP/OBS MODERATE 35: CPT | Performed by: FAMILY MEDICINE

## 2025-07-03 PROCEDURE — 85025 COMPLETE CBC W/AUTO DIFF WBC: CPT

## 2025-07-03 RX ORDER — HALOPERIDOL 5 MG/1
10 TABLET ORAL NIGHTLY
Status: DISCONTINUED | OUTPATIENT
Start: 2025-07-03 | End: 2025-07-08 | Stop reason: HOSPADM

## 2025-07-03 RX ORDER — MORPHINE SULFATE 15 MG/1
15 TABLET ORAL 3 TIMES DAILY
Refills: 0 | Status: DISPENSED | OUTPATIENT
Start: 2025-07-03 | End: 2025-07-07

## 2025-07-03 RX ORDER — SENNA AND DOCUSATE SODIUM 50; 8.6 MG/1; MG/1
2 TABLET, FILM COATED ORAL DAILY
Status: DISCONTINUED | OUTPATIENT
Start: 2025-07-03 | End: 2025-07-08 | Stop reason: HOSPADM

## 2025-07-03 RX ADMIN — POLYETHYLENE GLYCOL 3350 17 G: 17 POWDER, FOR SOLUTION ORAL at 02:25

## 2025-07-03 RX ADMIN — INSULIN LISPRO 6 UNITS: 100 INJECTION, SOLUTION INTRAVENOUS; SUBCUTANEOUS at 11:59

## 2025-07-03 RX ADMIN — INSULIN GLARGINE 5 UNITS: 100 INJECTION, SOLUTION SUBCUTANEOUS at 20:46

## 2025-07-03 RX ADMIN — HALOPERIDOL 10 MG: 5 TABLET ORAL at 23:15

## 2025-07-03 RX ADMIN — MORPHINE SULFATE 15 MG: 15 TABLET ORAL at 09:20

## 2025-07-03 RX ADMIN — MORPHINE SULFATE 15 MG: 15 TABLET ORAL at 17:33

## 2025-07-03 RX ADMIN — SENNOSIDES AND DOCUSATE SODIUM 2 TABLET: 8.6; 5 TABLET ORAL at 09:20

## 2025-07-03 RX ADMIN — INSULIN LISPRO 4 UNITS: 100 INJECTION, SOLUTION INTRAVENOUS; SUBCUTANEOUS at 21:17

## 2025-07-03 RX ADMIN — SODIUM CHLORIDE, PRESERVATIVE FREE 10 ML: 5 INJECTION INTRAVENOUS at 09:23

## 2025-07-03 RX ADMIN — LORAZEPAM 0.5 MG: 2 INJECTION INTRAMUSCULAR; INTRAVENOUS at 20:44

## 2025-07-03 RX ADMIN — MORPHINE SULFATE 15 MG: 15 TABLET ORAL at 13:15

## 2025-07-03 RX ADMIN — SODIUM CHLORIDE, PRESERVATIVE FREE 10 ML: 5 INJECTION INTRAVENOUS at 20:27

## 2025-07-03 ASSESSMENT — PAIN SCALES - GENERAL
PAINLEVEL_OUTOF10: 0
PAINLEVEL_OUTOF10: 0

## 2025-07-03 NOTE — CONSULTS
Behavioral Health Consultation    Admit Date: 7/1/2025  Hospital day 2    Vitals : Patient Vitals for the past 8 hrs:   BP Temp Temp src Pulse Resp SpO2   07/03/25 1733 -- -- -- -- 18 --   07/03/25 1522 103/62 98.4 °F (36.9 °C) Oral (!) 112 18 100 %   07/03/25 1315 -- -- -- -- 18 --   07/03/25 1300 -- -- -- (!) 102 -- --   07/03/25 1220 126/84 98.4 °F (36.9 °C) Oral 95 18 95 %   07/03/25 1100 -- -- -- (!) 102 -- --     Labs:    Recent Results (from the past 24 hours)   POCT Glucose    Collection Time: 07/03/25 11:48 AM   Result Value Ref Range    POC Glucose 313 (H) 70 - 110 mg/dL   POCT Glucose    Collection Time: 07/03/25  3:24 PM   Result Value Ref Range    POC Glucose 147 (H) 70 - 110 mg/dL     Meds:   Current Facility-Administered Medications   Medication Dose Route Frequency    morphine (MSIR) tablet 15 mg  15 mg Oral TID    sennosides-docusate sodium (SENOKOT-S) 8.6-50 MG tablet 2 tablet  2 tablet Oral Daily    insulin lispro (HUMALOG,ADMELOG) injection vial 0-8 Units  0-8 Units SubCUTAneous 4x Daily AC & HS    insulin glargine (LANTUS) injection vial 5 Units  5 Units SubCUTAneous Nightly    [Held by provider] enoxaparin Sodium (LOVENOX) injection 30 mg  30 mg SubCUTAneous Daily    0.9 % sodium chloride infusion   IntraVENous PRN    sodium chloride flush 0.9 % injection 5-40 mL  5-40 mL IntraVENous 2 times per day    sodium chloride flush 0.9 % injection 5-40 mL  5-40 mL IntraVENous PRN    0.9 % sodium chloride infusion   IntraVENous PRN    potassium chloride (KLOR-CON M) extended release tablet 40 mEq  40 mEq Oral PRN    Or    potassium bicarb-citric acid (EFFER-K) effervescent tablet 40 mEq  40 mEq Oral PRN    Or    potassium chloride 10 mEq/100 mL IVPB (Peripheral Line)  10 mEq IntraVENous PRN    magnesium sulfate 2000 mg in 50 mL IVPB premix  2,000 mg IntraVENous PRN    ondansetron (ZOFRAN-ODT) disintegrating tablet 4 mg  4 mg Oral Q8H PRN    Or    ondansetron (ZOFRAN) injection 4 mg  4 mg IntraVENous Q6H

## 2025-07-04 LAB
GLUCOSE BLD STRIP.AUTO-MCNC: 176 MG/DL (ref 70–110)
GLUCOSE BLD STRIP.AUTO-MCNC: 225 MG/DL (ref 70–110)
GLUCOSE BLD STRIP.AUTO-MCNC: 286 MG/DL (ref 70–110)
HCT VFR BLD AUTO: 20.8 % (ref 36–48)
HGB BLD-MCNC: 7.1 G/DL (ref 13–16)
HISTORY CHECK: NORMAL

## 2025-07-04 PROCEDURE — 30233N1 TRANSFUSION OF NONAUTOLOGOUS RED BLOOD CELLS INTO PERIPHERAL VEIN, PERCUTANEOUS APPROACH: ICD-10-PCS | Performed by: STUDENT IN AN ORGANIZED HEALTH CARE EDUCATION/TRAINING PROGRAM

## 2025-07-04 PROCEDURE — 94761 N-INVAS EAR/PLS OXIMETRY MLT: CPT

## 2025-07-04 PROCEDURE — 6370000000 HC RX 637 (ALT 250 FOR IP): Performed by: INTERNAL MEDICINE

## 2025-07-04 PROCEDURE — P9016 RBC LEUKOCYTES REDUCED: HCPCS

## 2025-07-04 PROCEDURE — 99233 SBSQ HOSP IP/OBS HIGH 50: CPT | Performed by: FAMILY MEDICINE

## 2025-07-04 PROCEDURE — 85018 HEMOGLOBIN: CPT

## 2025-07-04 PROCEDURE — 83540 ASSAY OF IRON: CPT

## 2025-07-04 PROCEDURE — 1100000003 HC PRIVATE W/ TELEMETRY

## 2025-07-04 PROCEDURE — 36415 COLL VENOUS BLD VENIPUNCTURE: CPT

## 2025-07-04 PROCEDURE — 83550 IRON BINDING TEST: CPT

## 2025-07-04 PROCEDURE — 85014 HEMATOCRIT: CPT

## 2025-07-04 PROCEDURE — 82962 GLUCOSE BLOOD TEST: CPT

## 2025-07-04 PROCEDURE — 2500000003 HC RX 250 WO HCPCS: Performed by: INTERNAL MEDICINE

## 2025-07-04 PROCEDURE — 36430 TRANSFUSION BLD/BLD COMPNT: CPT

## 2025-07-04 RX ORDER — SODIUM CHLORIDE 9 MG/ML
INJECTION, SOLUTION INTRAVENOUS PRN
Status: DISCONTINUED | OUTPATIENT
Start: 2025-07-04 | End: 2025-07-05

## 2025-07-04 RX ADMIN — INSULIN LISPRO 2 UNITS: 100 INJECTION, SOLUTION INTRAVENOUS; SUBCUTANEOUS at 20:37

## 2025-07-04 RX ADMIN — MORPHINE SULFATE 15 MG: 15 TABLET ORAL at 20:24

## 2025-07-04 RX ADMIN — HALOPERIDOL 10 MG: 5 TABLET ORAL at 20:21

## 2025-07-04 RX ADMIN — SENNOSIDES AND DOCUSATE SODIUM 2 TABLET: 8.6; 5 TABLET ORAL at 08:25

## 2025-07-04 RX ADMIN — INSULIN GLARGINE 5 UNITS: 100 INJECTION, SOLUTION SUBCUTANEOUS at 20:22

## 2025-07-04 RX ADMIN — MORPHINE SULFATE 15 MG: 15 TABLET ORAL at 08:25

## 2025-07-04 RX ADMIN — SODIUM CHLORIDE, PRESERVATIVE FREE 10 ML: 5 INJECTION INTRAVENOUS at 20:24

## 2025-07-04 RX ADMIN — SODIUM CHLORIDE, PRESERVATIVE FREE 10 ML: 5 INJECTION INTRAVENOUS at 08:26

## 2025-07-04 RX ADMIN — INSULIN LISPRO 4 UNITS: 100 INJECTION, SOLUTION INTRAVENOUS; SUBCUTANEOUS at 12:11

## 2025-07-04 ASSESSMENT — PAIN DESCRIPTION - DESCRIPTORS: DESCRIPTORS: DISCOMFORT

## 2025-07-04 ASSESSMENT — PAIN SCALES - GENERAL
PAINLEVEL_OUTOF10: 0
PAINLEVEL_OUTOF10: 8
PAINLEVEL_OUTOF10: 0

## 2025-07-04 ASSESSMENT — PAIN DESCRIPTION - LOCATION: LOCATION: GENERALIZED

## 2025-07-04 ASSESSMENT — PAIN - FUNCTIONAL ASSESSMENT: PAIN_FUNCTIONAL_ASSESSMENT: PREVENTS OR INTERFERES SOME ACTIVE ACTIVITIES AND ADLS

## 2025-07-04 NOTE — SIGNIFICANT EVENT
Called for critical  lab Hemoglobin 4.8    Came to bedside and patient states he feels week. He declines blood transfusion.  He states he understands the risks to his health and heart and the outcome could be deat.  He still doesn't want blood and doesn't want to get into the reasons why.    Continue to monitor.  Restarted Haldol 10mg nightly    Hailey Meyer MD  Internal medicine

## 2025-07-05 LAB
ABO + RH BLD: NORMAL
BLD PROD TYP BPU: NORMAL
BLD PROD TYP BPU: NORMAL
BLOOD BANK BLOOD PRODUCT EXPIRATION DATE: NORMAL
BLOOD BANK BLOOD PRODUCT EXPIRATION DATE: NORMAL
BLOOD BANK DISPENSE STATUS: NORMAL
BLOOD BANK DISPENSE STATUS: NORMAL
BLOOD BANK ISBT PRODUCT BLOOD TYPE: 5100
BLOOD BANK ISBT PRODUCT BLOOD TYPE: 5100
BLOOD BANK PRODUCT CODE: NORMAL
BLOOD BANK PRODUCT CODE: NORMAL
BLOOD BANK UNIT TYPE AND RH: NORMAL
BLOOD BANK UNIT TYPE AND RH: NORMAL
BLOOD GROUP ANTIBODIES SERPL: NORMAL
BPU ID: NORMAL
BPU ID: NORMAL
CALLED TO: NORMAL
CALLED TO:: NORMAL
CROSSMATCH RESULT: NORMAL
CROSSMATCH RESULT: NORMAL
FOLATE SERPL-MCNC: >20 NG/ML (ref 4.6–34.8)
GLUCOSE BLD STRIP.AUTO-MCNC: 224 MG/DL (ref 70–110)
GLUCOSE BLD STRIP.AUTO-MCNC: 251 MG/DL (ref 70–110)
GLUCOSE BLD STRIP.AUTO-MCNC: 265 MG/DL (ref 70–110)
GLUCOSE BLD STRIP.AUTO-MCNC: 293 MG/DL (ref 70–110)
IRON SATN MFR SERPL: 17 %
IRON SERPL-MCNC: 44 UG/DL (ref 50–175)
SPECIMEN EXP DATE BLD: NORMAL
TIBC SERPL-MCNC: 256 UG/DL (ref 250–450)
UIBC SERPL-MCNC: 212 UG/DL (ref 112–347)
UNIT DIVISION: 0
UNIT DIVISION: 0
UNIT ISSUE DATE/TIME: NORMAL
UNIT ISSUE DATE/TIME: NORMAL
VIT B12 SERPL-MCNC: >2000 PG/ML (ref 211–911)

## 2025-07-05 PROCEDURE — 6370000000 HC RX 637 (ALT 250 FOR IP): Performed by: INTERNAL MEDICINE

## 2025-07-05 PROCEDURE — 82607 VITAMIN B-12: CPT

## 2025-07-05 PROCEDURE — 99232 SBSQ HOSP IP/OBS MODERATE 35: CPT | Performed by: STUDENT IN AN ORGANIZED HEALTH CARE EDUCATION/TRAINING PROGRAM

## 2025-07-05 PROCEDURE — 36415 COLL VENOUS BLD VENIPUNCTURE: CPT

## 2025-07-05 PROCEDURE — 82962 GLUCOSE BLOOD TEST: CPT

## 2025-07-05 PROCEDURE — 94761 N-INVAS EAR/PLS OXIMETRY MLT: CPT

## 2025-07-05 PROCEDURE — 1100000003 HC PRIVATE W/ TELEMETRY

## 2025-07-05 PROCEDURE — 82746 ASSAY OF FOLIC ACID SERUM: CPT

## 2025-07-05 RX ADMIN — INSULIN GLARGINE 5 UNITS: 100 INJECTION, SOLUTION SUBCUTANEOUS at 20:25

## 2025-07-05 RX ADMIN — SENNOSIDES AND DOCUSATE SODIUM 2 TABLET: 8.6; 5 TABLET ORAL at 09:35

## 2025-07-05 RX ADMIN — INSULIN LISPRO 4 UNITS: 100 INJECTION, SOLUTION INTRAVENOUS; SUBCUTANEOUS at 20:25

## 2025-07-05 RX ADMIN — HALOPERIDOL 10 MG: 5 TABLET ORAL at 20:11

## 2025-07-05 ASSESSMENT — PAIN SCALES - GENERAL
PAINLEVEL_OUTOF10: 0
PAINLEVEL_OUTOF10: 4

## 2025-07-05 NOTE — CARE COORDINATION
07/05/25 1550   Service Assessment   Patient Orientation Alert and Oriented;Person;Place;Situation;Self   Cognition Alert   History Provided By Patient;Significant Other   Primary Caregiver Self   Accompanied By/Relationship Ten Barney (@bedside).   Support Systems Spouse/Significant Other  (Wife Savita (@bedside).)   Patient's Healthcare Decision Maker is: Legal Next of Kin  (Wife Savita reported she has been given Advanced Directive  paperwork. Palliative consulted & to follow up Mon 7/7 (per review MD note - wife aware).)   PCP Verified by CM Yes  (Dr. Grimes per pt.)   Prior Functional Level Independent in ADLs/IADLs   Current Functional Level Independent in ADLs/IADLs   Can patient return to prior living arrangement Yes   Ability to make needs known: Good   Family able to assist with home care needs: Yes   Would you like for me to discuss the discharge plan with any other family members/significant others, and if so, who? Yes  (Wife Savita.)   Financial Resources Medicare  (Wife Savita & patient reported does have Medicaid or any other health insurance when asked & only has Medicare via [facility].)   Social/Functional History   Lives With Spouse   Type of Home House   Home Layout One level;Able to Live on Main level with bedroom/bathroom;Performs ADL's on one level   Home Access Stairs to enter with rails   Entrance Stairs - Number of Steps 3   Entrance Stairs - Rails Right  (Per wife.)   Bathroom Shower/Tub Tub/Shower unit   Bathroom Toilet Standard   Bathroom Accessibility Accessible   Home Equipment   (Shower Chair per pt & wife.)   Prior Level of Assist for ADLs Independent   Prior Level of Assist for Homemaking Independent   Homemaking Responsibilities Yes   Ambulation Assistance Independent   Prior Level of Assist for Transfers Independent   Active  Yes  (Drives self per pt.)   Mode of Transportation Car   Occupation On disability  (Disability Income via Social Security converted to Social Security

## 2025-07-06 LAB
BASOPHILS # BLD: 0.01 K/UL (ref 0–0.1)
BASOPHILS # BLD: 0.01 K/UL (ref 0–0.1)
BASOPHILS NFR BLD: 0.1 % (ref 0–2)
BASOPHILS NFR BLD: 0.2 % (ref 0–2)
DIFFERENTIAL METHOD BLD: ABNORMAL
DIFFERENTIAL METHOD BLD: ABNORMAL
EOSINOPHIL # BLD: 0.01 K/UL (ref 0–0.4)
EOSINOPHIL # BLD: 0.01 K/UL (ref 0–0.4)
EOSINOPHIL NFR BLD: 0.1 % (ref 0–5)
EOSINOPHIL NFR BLD: 0.2 % (ref 0–5)
ERYTHROCYTE [DISTWIDTH] IN BLOOD BY AUTOMATED COUNT: 15.1 % (ref 11.6–14.5)
ERYTHROCYTE [DISTWIDTH] IN BLOOD BY AUTOMATED COUNT: 15.2 % (ref 11.6–14.5)
GLUCOSE BLD STRIP.AUTO-MCNC: 186 MG/DL (ref 70–110)
GLUCOSE BLD STRIP.AUTO-MCNC: 189 MG/DL (ref 70–110)
GLUCOSE BLD STRIP.AUTO-MCNC: 216 MG/DL (ref 70–110)
HCT VFR BLD AUTO: 19.9 % (ref 36–48)
HCT VFR BLD AUTO: 20.5 % (ref 36–48)
HGB BLD-MCNC: 6.6 G/DL (ref 13–16)
HGB BLD-MCNC: 6.7 G/DL (ref 13–16)
IMM GRANULOCYTES # BLD AUTO: 0.02 K/UL (ref 0–0.04)
IMM GRANULOCYTES # BLD AUTO: 0.02 K/UL (ref 0–0.04)
IMM GRANULOCYTES NFR BLD AUTO: 0.3 % (ref 0–0.5)
IMM GRANULOCYTES NFR BLD AUTO: 0.3 % (ref 0–0.5)
LYMPHOCYTES # BLD: 0.78 K/UL (ref 0.9–3.6)
LYMPHOCYTES # BLD: 1.19 K/UL (ref 0.9–3.6)
LYMPHOCYTES NFR BLD: 12 % (ref 21–52)
LYMPHOCYTES NFR BLD: 15.3 % (ref 21–52)
MCH RBC QN AUTO: 30.2 PG (ref 24–34)
MCH RBC QN AUTO: 30.4 PG (ref 24–34)
MCHC RBC AUTO-ENTMCNC: 32.7 G/DL (ref 31–37)
MCHC RBC AUTO-ENTMCNC: 33.2 G/DL (ref 31–37)
MCV RBC AUTO: 91.7 FL (ref 78–100)
MCV RBC AUTO: 92.3 FL (ref 78–100)
MONOCYTES # BLD: 0.75 K/UL (ref 0.05–1.2)
MONOCYTES # BLD: 1.07 K/UL (ref 0.05–1.2)
MONOCYTES NFR BLD: 11.6 % (ref 3–10)
MONOCYTES NFR BLD: 13.8 % (ref 3–10)
NEUTS SEG # BLD: 4.92 K/UL (ref 1.8–8)
NEUTS SEG # BLD: 5.48 K/UL (ref 1.8–8)
NEUTS SEG NFR BLD: 70.4 % (ref 40–73)
NEUTS SEG NFR BLD: 75.7 % (ref 40–73)
NRBC # BLD: 0.03 K/UL (ref 0–0.01)
NRBC # BLD: 0.03 K/UL (ref 0–0.01)
NRBC BLD-RTO: 0.4 PER 100 WBC
NRBC BLD-RTO: 0.5 PER 100 WBC
PLATELET # BLD AUTO: 203 K/UL (ref 135–420)
PLATELET # BLD AUTO: 235 K/UL (ref 135–420)
PMV BLD AUTO: 10 FL (ref 9.2–11.8)
PMV BLD AUTO: 9.9 FL (ref 9.2–11.8)
RBC # BLD AUTO: 2.17 M/UL (ref 4.35–5.65)
RBC # BLD AUTO: 2.22 M/UL (ref 4.35–5.65)
WBC # BLD AUTO: 6.5 K/UL (ref 4.6–13.2)
WBC # BLD AUTO: 7.8 K/UL (ref 4.6–13.2)

## 2025-07-06 PROCEDURE — 82962 GLUCOSE BLOOD TEST: CPT

## 2025-07-06 PROCEDURE — 36415 COLL VENOUS BLD VENIPUNCTURE: CPT

## 2025-07-06 PROCEDURE — 6370000000 HC RX 637 (ALT 250 FOR IP): Performed by: INTERNAL MEDICINE

## 2025-07-06 PROCEDURE — 1100000003 HC PRIVATE W/ TELEMETRY

## 2025-07-06 PROCEDURE — 99231 SBSQ HOSP IP/OBS SF/LOW 25: CPT | Performed by: STUDENT IN AN ORGANIZED HEALTH CARE EDUCATION/TRAINING PROGRAM

## 2025-07-06 PROCEDURE — 2500000003 HC RX 250 WO HCPCS: Performed by: INTERNAL MEDICINE

## 2025-07-06 PROCEDURE — 85025 COMPLETE CBC W/AUTO DIFF WBC: CPT

## 2025-07-06 RX ORDER — SODIUM CHLORIDE 9 MG/ML
INJECTION, SOLUTION INTRAVENOUS PRN
Status: DISCONTINUED | OUTPATIENT
Start: 2025-07-06 | End: 2025-07-06

## 2025-07-06 RX ADMIN — INSULIN LISPRO 2 UNITS: 100 INJECTION, SOLUTION INTRAVENOUS; SUBCUTANEOUS at 21:47

## 2025-07-06 RX ADMIN — SODIUM CHLORIDE, PRESERVATIVE FREE 10 ML: 5 INJECTION INTRAVENOUS at 04:54

## 2025-07-06 RX ADMIN — SENNOSIDES AND DOCUSATE SODIUM 2 TABLET: 8.6; 5 TABLET ORAL at 08:56

## 2025-07-06 RX ADMIN — INSULIN GLARGINE 5 UNITS: 100 INJECTION, SOLUTION SUBCUTANEOUS at 21:47

## 2025-07-06 RX ADMIN — HALOPERIDOL 10 MG: 5 TABLET ORAL at 21:45

## 2025-07-06 ASSESSMENT — PAIN SCALES - GENERAL
PAINLEVEL_OUTOF10: 0
PAINLEVEL_OUTOF10: 0

## 2025-07-07 LAB
BASOPHILS # BLD: 0.01 K/UL (ref 0–0.1)
BASOPHILS NFR BLD: 0.2 % (ref 0–2)
DIFFERENTIAL METHOD BLD: ABNORMAL
EOSINOPHIL # BLD: 0.03 K/UL (ref 0–0.4)
EOSINOPHIL NFR BLD: 0.5 % (ref 0–5)
ERYTHROCYTE [DISTWIDTH] IN BLOOD BY AUTOMATED COUNT: 14.8 % (ref 11.6–14.5)
GLUCOSE BLD STRIP.AUTO-MCNC: 153 MG/DL (ref 70–110)
GLUCOSE BLD STRIP.AUTO-MCNC: 231 MG/DL (ref 70–110)
GLUCOSE BLD STRIP.AUTO-MCNC: 253 MG/DL (ref 70–110)
GLUCOSE BLD STRIP.AUTO-MCNC: 97 MG/DL (ref 70–110)
HCT VFR BLD AUTO: 19.6 % (ref 36–48)
HGB BLD-MCNC: 6.5 G/DL (ref 13–16)
IMM GRANULOCYTES # BLD AUTO: 0.02 K/UL (ref 0–0.04)
IMM GRANULOCYTES NFR BLD AUTO: 0.3 % (ref 0–0.5)
LYMPHOCYTES # BLD: 0.87 K/UL (ref 0.9–3.6)
LYMPHOCYTES NFR BLD: 13.7 % (ref 21–52)
MCH RBC QN AUTO: 30.8 PG (ref 24–34)
MCHC RBC AUTO-ENTMCNC: 33.2 G/DL (ref 31–37)
MCV RBC AUTO: 92.9 FL (ref 78–100)
MONOCYTES # BLD: 0.9 K/UL (ref 0.05–1.2)
MONOCYTES NFR BLD: 14.1 % (ref 3–10)
NEUTS SEG # BLD: 4.54 K/UL (ref 1.8–8)
NEUTS SEG NFR BLD: 71.2 % (ref 40–73)
NRBC # BLD: 0.02 K/UL (ref 0–0.01)
NRBC BLD-RTO: 0.3 PER 100 WBC
PLATELET # BLD AUTO: 227 K/UL (ref 135–420)
PMV BLD AUTO: 9.8 FL (ref 9.2–11.8)
RBC # BLD AUTO: 2.11 M/UL (ref 4.35–5.65)
WBC # BLD AUTO: 6.4 K/UL (ref 4.6–13.2)

## 2025-07-07 PROCEDURE — 1100000003 HC PRIVATE W/ TELEMETRY

## 2025-07-07 PROCEDURE — 82962 GLUCOSE BLOOD TEST: CPT

## 2025-07-07 PROCEDURE — 94761 N-INVAS EAR/PLS OXIMETRY MLT: CPT

## 2025-07-07 PROCEDURE — 6370000000 HC RX 637 (ALT 250 FOR IP): Performed by: INTERNAL MEDICINE

## 2025-07-07 PROCEDURE — 85025 COMPLETE CBC W/AUTO DIFF WBC: CPT

## 2025-07-07 PROCEDURE — 36415 COLL VENOUS BLD VENIPUNCTURE: CPT

## 2025-07-07 PROCEDURE — 99232 SBSQ HOSP IP/OBS MODERATE 35: CPT | Performed by: STUDENT IN AN ORGANIZED HEALTH CARE EDUCATION/TRAINING PROGRAM

## 2025-07-07 RX ADMIN — INSULIN GLARGINE 5 UNITS: 100 INJECTION, SOLUTION SUBCUTANEOUS at 21:08

## 2025-07-07 RX ADMIN — INSULIN LISPRO 4 UNITS: 100 INJECTION, SOLUTION INTRAVENOUS; SUBCUTANEOUS at 21:09

## 2025-07-07 RX ADMIN — SENNOSIDES AND DOCUSATE SODIUM 2 TABLET: 8.6; 5 TABLET ORAL at 09:36

## 2025-07-07 RX ADMIN — HALOPERIDOL 10 MG: 5 TABLET ORAL at 21:09

## 2025-07-07 ASSESSMENT — PAIN SCALES - GENERAL
PAINLEVEL_OUTOF10: 0

## 2025-07-08 VITALS
WEIGHT: 128 LBS | DIASTOLIC BLOOD PRESSURE: 85 MMHG | OXYGEN SATURATION: 100 % | SYSTOLIC BLOOD PRESSURE: 129 MMHG | RESPIRATION RATE: 22 BRPM | BODY MASS INDEX: 18.32 KG/M2 | HEART RATE: 100 BPM | TEMPERATURE: 98.4 F | HEIGHT: 70 IN

## 2025-07-08 PROBLEM — E44.0 MODERATE PROTEIN-CALORIE MALNUTRITION: Chronic | Status: ACTIVE | Noted: 2025-07-08

## 2025-07-08 LAB — GLUCOSE BLD STRIP.AUTO-MCNC: 244 MG/DL (ref 70–110)

## 2025-07-08 PROCEDURE — 94761 N-INVAS EAR/PLS OXIMETRY MLT: CPT

## 2025-07-08 PROCEDURE — 6370000000 HC RX 637 (ALT 250 FOR IP): Performed by: INTERNAL MEDICINE

## 2025-07-08 PROCEDURE — 82962 GLUCOSE BLOOD TEST: CPT

## 2025-07-08 PROCEDURE — 99239 HOSP IP/OBS DSCHRG MGMT >30: CPT | Performed by: STUDENT IN AN ORGANIZED HEALTH CARE EDUCATION/TRAINING PROGRAM

## 2025-07-08 RX ADMIN — SENNOSIDES AND DOCUSATE SODIUM 2 TABLET: 8.6; 5 TABLET ORAL at 09:02

## 2025-07-08 ASSESSMENT — PAIN SCALES - GENERAL
PAINLEVEL_OUTOF10: 0

## 2025-07-08 NOTE — PROGRESS NOTES
Interventional Radiology     All four quadrants scanned with ultrasound and patient has trace ascites. No safe window for percutaneous access of fluid.   No paracentesis performed at this time.     Thank you,  HARINI Kilgore  [phone]  
    Pharmacist Review and Automatic Dose Adjustment of Prophylactic Enoxaparin    *Review reason for admission/hospital problem list*    The reviewing pharmacist has made an adjustment to the ordered enoxaparin dose or converted to UFH per the approved [facility] protocol and table as identified below.        John Juarez is a 70 y.o. male.     Recent Labs     07/01/25  1515 07/02/25  0644   CREATININE 1.64* 2.46*       Estimated Creatinine Clearance: 22 mL/min (A) (based on SCr of 2.46 mg/dL (H)).    Height:   Ht Readings from Last 1 Encounters:   07/01/25 1.778 m (5' 10\")     Weight:  Wt Readings from Last 1 Encounters:   07/01/25 58.1 kg (128 lb)               Plan: Based upon the patient's weight and renal function, the ordered enoxaparin dose of 40 mg daily has been changed/converted to 30 mg daily      Thank you,  Teena Bautista [facility]   
   Hematology / Oncology Progress Note      Patient: John Juarez  Gender: male   MRN: [id number]    YOB: 1955 Age: 70 y.o.   CSN: [id number]    LOS:  LOS: 4 days   Admit Date: 7/1/2025     PCP: Jose Grimes MD    Assessment:   1- Mets GIST .Has progressed on Gleevec, Sutent.Has not started Stivarga due to concerns re side effects  2- Intractable Pain, now reports no pain.   3- Anemia -likely secondary to GIST    Plan:   - Hgb after 2 PRBCs went up to 7.1 g/dl.  - spoke with pt and wife at bedside and wife says he has not eaten today. Pt says he is fine and knows his body. I offered marinol but he declined.  - Palliative care Monday. Pt appropriate for hospice.   - Dr. Fields will see Monday.     Subjective:     Pt says he is fine no new symptoms.   Objective:   /86   Pulse (!) 110   Temp 97.9 °F (36.6 °C) (Oral)   Resp 16   Ht 1.778 m (5' 10\")   Wt 58.1 kg (128 lb)   SpO2 100%   BMI 18.37 kg/m²           Intake/Output Summary (Last 24 hours) at 7/5/2025 0920  Last data filed at 7/5/2025 0345  Gross per 24 hour   Intake 765.41 ml   Output 550 ml   Net 215.41 ml       Review of Systems:   Constitutional: negative for fevers, chills, sweats and  + fatigue, + weight loss  Eyes: negative for visual disturbance, redness and icterus  Ears, Nose, Mouth, Throat, and Face: negative for tinnitus, epistaxis, sore mouth and hoarseness  Respiratory: negative for cough, sputum, hemoptysis, pleurisy/chest pain or wheezing  Cardiovascular: negative for chest pain, chest pressure/discomfort, palpitations, irregular heart beats, syncope, paroxysmal nocturnal dyspnea  Gastrointestinal: negative for reflux symptoms, nausea, vomiting, change in bowel habits, melena, diarrhea, constipation and abdominal pain  Genitourinary:negative for dysuria, nocturia, urinary incontinence, hesitancy and hematuria  Integument: negative for rash, skin lesion(s) and pruritus  Hematologic/Lymphatic: negative for easy 
   Hematology / Oncology Progress Note  [facility]      Patient ID:  John Juarez  70 y.o.  1955    Admit Date: 2025    Assessment:     Principal Problem:    Intractable pain  Active Problems:    Hypertension    Dyslipidemia    Paranoid schizophrenia (HCC)    Diabetes mellitus, type II (HCC)    Gastrointestinal stromal tumor (GIST) (HCC)    Encounter for palliative care    Goals of care, counseling/discussion    Intractable abdominal pain    History of partial nephrectomy    History of gastrectomy    Anemia    Metastatic renal cell carcinoma to intra-abdominal site (HCC)  Resolved Problems:    * No resolved hospital problems. *    Anemia  Pain    Plan:     Track cbc, lytes  Anemia screens  Pain mx  Consult psych, resume meds per Dr Zimmer  Consider repeat CT scan abdomen (could he be bleeding into his tumor)    Subjective:   Pain is controlled for most part but persists, unable to do paracentesis  Wife reports he has been without his schizophrenia meds since admission      Objective:     /70   Pulse (!) 114   Temp 97.2 °F (36.2 °C) (Oral)   Resp 18   Ht 1.778 m (5' 10\")   Wt 58.1 kg (128 lb)   SpO2 100%   BMI 18.37 kg/m²           Temp (24hrs), Av.9 °F (36.6 °C), Min:97.2 °F (36.2 °C), Max:98.4 °F (36.9 °C)      No intake or output data in the 24 hours ending 25 0033    Review of Systems:   Constitutional:  No Fever; No chills; No weight loss    Skin:  No rash; No itching   HEENT:  No changes in vision or hearing   Cardiovascular:  No palpitations, chest pain, angina   Respiratory:  No shortness of breath, no wheezing, no pleurisy, no cough, no  hemoptysis   Gastrointestinal:  No nausea or vomiting; No diarrhea, no dyspepsis   Genitourinary:  No dysuria; No increase in urinary frequency   Musculoskeletal:  No weakness or muscle pains   Endo:  No polyuria; no symptoms of thyroid dysfunction   Heme:  No bruising; No bleeding, no adenopathy   Neurological:  No 
   Hematology / Oncology Progress Note  [facility]      Patient ID:  John Juarez  70 y.o.  1955    Admit Date: 2025    Assessment:     Principal Problem:    Intractable pain  Active Problems:    Hypertension    Dyslipidemia    Paranoid schizophrenia (HCC)    Diabetes mellitus, type II (HCC)    Gastrointestinal stromal tumor (GIST) (HCC)    Encounter for palliative care    Goals of care, counseling/discussion    Intractable abdominal pain    History of partial nephrectomy    History of gastrectomy    Anemia    Metastatic renal cell carcinoma to intra-abdominal site (HCC)  Resolved Problems:    * No resolved hospital problems. *    Anemia, requiring prbc, repeated  No CT evidence of overt bleeding  Large gastric masses  Plan:     Track cbc, lytes  Cont pain mx  Cont haldol    Touched again on palliative care eval goals of care, poa, code status, etc    Pls get GI to comment, if he has bleeding that can be pinpointed, he may be a candidate for radiation    Prbc as appropriate    Subjective:   Says pain is controlled.  Denies bloody stools    Objective:     BP (!) 133/90   Pulse (!) 102   Temp 97.7 °F (36.5 °C) (Oral)   Resp 17   Ht 1.778 m (5' 10\")   Wt 58.1 kg (128 lb)   SpO2 100%   BMI 18.37 kg/m²           Temp (24hrs), Av °F (36.7 °C), Min:97.7 °F (36.5 °C), Max:98.2 °F (36.8 °C)      No intake or output data in the 24 hours ending 25 1111    Review of Systems:   Constitutional:  No Fever; No chills; No weight loss    Skin:  No rash; No itching   HEENT:  No changes in vision or hearing   Cardiovascular:  No palpitations, chest pain, angina   Respiratory:  No shortness of breath, no wheezing, no pleurisy, no cough, no  hemoptysis   Gastrointestinal:  No nausea or vomiting; No diarrhea, no dyspepsis   Genitourinary:  No dysuria; No increase in urinary frequency   Musculoskeletal:  No weakness or muscle pains   Endo:  No polyuria; no symptoms of thyroid dysfunction 
   Hematology / Oncology Progress Note  [facility]      Patient ID:  John Juarez  70 y.o.  1955    Admit Date: 2025    Assessment:     Principal Problem:    Intractable pain  Active Problems:    Hypertension    Dyslipidemia    Paranoid schizophrenia (HCC)    Diabetes mellitus, type II (HCC)    Gastrointestinal stromal tumor (GIST) (HCC)    Encounter for palliative care    Goals of care, counseling/discussion    Intractable abdominal pain    History of partial nephrectomy    History of gastrectomy    Anemia    Metastatic renal cell carcinoma to intra-abdominal site (HCC)  Resolved Problems:    * No resolved hospital problems. *    Locally recurrent and mets GISt  Anemia, no overt bleeding source,   GI does not feel endoscopy beneficial to possibly pinpoint bleeding site    Plan:     Optimize pain control  Pt refusing prbc  Cont diet  Do not omit haldol  If d/c, would attempt starting stivarga with slow titration up and outpt follow up    Subjective:   Says he hurts when his stomach gets full but pain is manageable  Refusing prbc    Objective:     BP (!) 161/99   Pulse (!) 101   Temp 98.6 °F (37 °C) (Oral)   Resp 20   Ht 1.778 m (5' 10\")   Wt 58.1 kg (128 lb)   SpO2 100%   BMI 18.37 kg/m²           Temp (24hrs), Av.2 °F (36.8 °C), Min:97.6 °F (36.4 °C), Max:98.6 °F (37 °C)      No intake or output data in the 24 hours ending 25 0955    Review of Systems:   Constitutional:  No Fever; No chills; No weight loss    Skin:  No rash; No itching   HEENT:  No changes in vision or hearing   Cardiovascular:  No palpitations, chest pain, angina   Respiratory:  No shortness of breath, no wheezing, no pleurisy, no cough, no  hemoptysis   Gastrointestinal:  No nausea or vomiting; No diarrhea, no dyspepsis   Genitourinary:  No dysuria; No increase in urinary frequency   Musculoskeletal:  No weakness or muscle pains   Endo:  No polyuria; no symptoms of thyroid dysfunction   Heme:  No 
4 Eyes Skin Assessment     NAME:  John Juarez  YOB: 1955  MEDICAL RECORD NUMBER:  [id number]    The patient is being assessed for  Shift Handoff    I agree that at least one RN has performed a thorough Head to Toe Skin Assessment on the patient. ALL assessment sites listed below have been assessed.      Areas assessed by both nurses:    Head, Face, Ears, Shoulders, Back, Chest, Arms, Elbows, Hands, Sacrum. Buttock, Coccyx, Ischium, and Legs. Feet and Heels        Does the Patient have a Wound? No noted wound(s)       Wilfredo Prevention initiated by RN: No  Wound Care Orders initiated by RN: No    Pressure Injury (Stage 1,2,3,4, Unstageable, DTI, NWPT, and Complex wounds) if present, place Wound referral order by RN under : No    New Ostomies, if present place, Ostomy referral order under : No     Nurse 1 eSignature: Electronically signed by TRAE Zavala on 7/4/25 at 7:24 PM EDT    **SHARE this note so that the co-signing nurse can place an eSignature**    Nurse 2 eSignature: Electronically signed by TRAE Mejias on 7/4/25 at 7:24 PM EDT   
4 Eyes Skin Assessment     NAME:  John Juarez  YOB: 1955  MEDICAL RECORD NUMBER:  [id number]    The patient is being assessed for  Shift Handoff    I agree that at least one RN has performed a thorough Head to Toe Skin Assessment on the patient. ALL assessment sites listed below have been assessed.      Areas assessed by both nurses:    Head, Face, Ears, Shoulders, Back, Chest, Arms, Elbows, Hands, Sacrum. Buttock, Coccyx, Ischium, and Legs. Feet and Heels        Does the Patient have a Wound? No noted wound(s)       Wilfredo Prevention initiated by RN: No  Wound Care Orders initiated by RN: No    Pressure Injury (Stage 1,2,3,4, Unstageable, DTI, NWPT, and Complex wounds) if present, place Wound referral order by RN under : No    New Ostomies, if present place, Ostomy referral order under : No     Nurse 1 eSignature: Electronically signed by TRAE Zavala on 7/3/25 at 7:20 PM EDT    **SHARE this note so that the co-signing nurse can place an eSignature**    Nurse 2 eSignature: Electronically signed by TRAE Mejias on 7/3/25 at 7:20 PM EDT   
Advance Care Planning     Advance Care Planning Inpatient Note  [facility] Department    Today's Date: 7/2/2025  Unit: [facility]    Received request from patient.  Upon review of chart and communication with care team, patient's decision making abilities are not in question.. Patient was/were present in the room during visit.    Goals of ACP Conversation:  Discuss advance care planning documents    Health Care Decision Makers:     No healthcare decision makers have been documented.    Summary:  No Decision Maker named by patient at this time    Advance Care Planning Documents (Patient Wishes):  None     Assessment:  Discuss advance directive with patient. Patient would like to discuss with spouse before completing. Offered availability. Prayer provided.     Interventions:  Provided education on documents for clarity and greater understanding  Reviewed but did not complete ACP document    Care Preferences Communicated:   No    Outcomes/Plan:  ACP Discussion: Completed  Teach Back Method used to verify the patient's and/or Healthcare Decision Maker's understanding of key information in the advance directive documents    Electronically signed by Chaplain Joshua on 7/2/2025 at 10:51 AM           
Blood bank called to notify nurse blood was ready; This nurse went to go ask patient if he is willing to receive blood. Patient stated \"no\". Provider aware; Blood bank aware.  
Brief note  Hematology Oncology  VOA    Mets GIST   Has progressed on Gleevec, Sutent  Has not started Stivarga due to concerns re side effects    Adm for abdominal pain    Ct remarkable for inc disease v 1/2025  Ascites    Anemia    Recommend:    Paracentesis therapeutic/diagnostic  Prbc  Pain mx  Palliative care input    Full note to follow    Ahsan Fields MD  
Comprehensive Nutrition Assessment    Type and Reason for Visit:  Initial, Positive nutrition screen    Nutrition Recommendations/Plan:   Continue current diet as tolerated.  Continue oral supplements: Glucerna (each provides 220 kcal, 10g protein) TID  Daily wts.  Continue to monitor tolerance of PO, compliance of oral supplements, weight, labs, and plan of care during admission.     Malnutrition Assessment:  Malnutrition Status:  At risk for malnutrition (fat/muscle wasting; catabolic illness) (07/02/25 1233)    Context:  Chronic Illness     Findings of the 6 clinical characteristics of malnutrition:  Energy Intake:  Unable to assess  Weight Loss:  Mild weight loss (-3.8% x6 months)     Body Fat Loss:  Mild body fat loss Buccal region, Orbital   Muscle Mass Loss:  Mild muscle mass loss Temples (temporalis), Clavicles (pectoralis & deltoids), Scapula (trapezius)  Fluid Accumulation:  No fluid accumulation     Strength:  Not Performed    Nutrition History and Allergies:      Past Medical History:   Diagnosis Date    Blister of foot or toe     BPH (benign prostatic hyperplasia)     DM w/o complication type II (HCC)     Elevated prostate specific antigen (PSA)     Hypertension     Lower urinary tract symptoms (LUTS)     Nodular prostate without urinary obstruction     Schizophrenia (HCC)     Unspecified disorder of penis      PTA: Pt declined to speak with RD    Weight Hx: 133 lbs Wt change: -5 lb (-3.8%) x 6 months  - not significant.   Wt Readings from Last 10 Encounters:   07/01/25 58.1 kg (128 lb)   06/27/25 57.6 kg (127 lb)   01/08/25 60.3 kg (133 lb)   05/15/23 60.2 kg (132 lb 11.5 oz)   03/31/23 59 kg (130 lb)   10/26/22 59.9 kg (132 lb)   08/24/22 59.9 kg (132 lb)   08/03/22 59.9 kg (132 lb)   03/24/21 59.9 kg (132 lb)     NFPE: unable to perform NFPE. Food Allergies: NKFA    Nutrition Assessment:    Admitted for intractable abdominal pain; followed by oncology for mets GI stromal tumor (GIST). Hx prostate 
Discharge instructions reviewed with pt and pt's wife. Understanding verbalized. Rolling walker assembled and given to pt.    Pt discharged in stable condition via wheelchair to be transported home by wife.   
Occupational Therapy  Occupational Therapy Screen    Patient: John Juarez (70 y.o. male)  Date: 7/2/2025  Diagnosis: Metastatic renal cell carcinoma to intra-abdominal site (HCC) [C79.89, C64.9]  Intractable pain [R52]  Intractable abdominal pain [R10.9]  Type 2 diabetes mellitus with hyperglycemia, without long-term current use of insulin (HCC) [E11.65] Intractable pain        OT order received and chart reviewed. The role of Occupational Therapy was explained to the patient and that the evaluation would consist of assessing items such as self-care, functional mobility, cognition, and upper extremity skills. [facility] completed via patient report.     [facility] AM-PAC® Daily Activity Inpatient Short Form (6-Clicks)     How much difficulty does the patient currently have     Total    A Lot   A Little    None    Putting on and taking off regular lower body clothing?    [] 1 [] 2 [] 3 [x] 4   2. Bathing (which includes washing, rinsing, drying)?     [] 1 [] 2 [x] 3 [] 4   3. Toileting (which includes using toilet, bedpan, or urinal)?    [] 1 [] 2 [x] 3 [] 4   4. Putting on and taking off regular upper body clothing?    [] 1 [] 2 [] 3 [x] 4   5. Taking care of personal grooming?    [] 1 [] 2 [] 3 [x] 4   6. Eating meals?    [] 1 [] 2 [] 3 [x] 4                                                                  Total 22      Patient presents at functional baseline this date for ADL/IADL performance. Formal OT evaluation is not indicated. Patient in agreement to discontinue occupational therapy services at this time. OT will sign off. If patient has a change in medical status, please re-order services. Thank you.      Crissy Mejia, OTELBA, OTR/L  
PHYSICAL THERAPY EVALUATION/DISCHARGE    Patient: John Juarez (70 y.o. male)  Date: 7/2/2025  Primary Diagnosis: Metastatic renal cell carcinoma to intra-abdominal site (HCC) [C79.89, C64.9]  Intractable pain [R52]  Intractable abdominal pain [R10.9]  Type 2 diabetes mellitus with hyperglycemia, without long-term current use of insulin (HCC) [E11.65]       Precautions: Fall Risk,  ,  ,  ,  ,  ,  ,    PLOF:   Independent. Lives with spouse in single story home.     ASSESSMENT AND RECOMMENDATIONS:  Patient resting in bed upon entry and agreeable to PT evaluation. He is independent with functional mobility. Reports feeling generally weak. Grossly 4/5 BLE strength. Supervision sit to stand transfer. He ambulates in the room with supervision and c/o dizziness. No significant LOB observed. /64 and hear rate in the 120's. Patient declines need for equipment, such as walker. Encouraged to call for nursing supervision with OOB mobility to monitor symptoms. All needs left within reach at end of session.     Patient does not require further skilled physical therapy intervention at this level of care.    Further Equipment Recommendations for Discharge: none    [facility]: AM-[facility] Inpatient Mobility Raw Score : 22      At this time and based on an AM-PAC score, no further PT is recommended upon discharge.  Recommend patient returns to prior setting with prior services.    This [facility] score should be considered in conjunction with interdisciplinary team recommendations to determine the most appropriate discharge setting. Patient's social support, diagnosis, medical stability, and prior level of function should also be taken into consideration.     SUBJECTIVE:   Patient stated “I don't need anything.”    OBJECTIVE DATA SUMMARY:     Past Medical History:   Diagnosis Date    Blister of foot or toe     BPH (benign prostatic hyperplasia)     DM w/o complication type II (HCC)     Elevated prostate specific antigen (PSA)     
Patient refusing insulin with a blood glucose of 262. Patient educated on the importance of insulin with his blood glucose levels and the dangers of not taking it. Patient refused, provider aware.  
Pt had witnessed fall from wife in room. According to wife pt fell on back right hip. This RN assessed pt. No head injury noted, no injury to side of hip. Pt refused to roll over and let us check back. Last bp taken 144/91 pulse 101 spo2 100%. Pt agitated and attempted to take out IV. We were able to talk him down. Pt calm and resting in bed currently with wife in room. Messaged provider via perfect serve. Waiting on response.   
Pt has been refusing all care, refused blood sugar checks, allowed vitals after  lot of convincing.    BP (!) 95/55   Pulse (!) 120   Temp 97.8 °F (36.6 °C) (Oral)   Resp 20   Ht 1.778 m (5' 10\")   Wt 58.1 kg (128 lb)   SpO2 100%   BMI 18.37 kg/m²     On call provider notified.  
Pt refusing blood. MD notified.  
RN spoke to this patient with nursing  at the bed side. Tried to convince the patient about the blood transfusion that he needed for his hemoglobin to boost up. However, patient is keep on refusing. Explained the benefits and the cons of this procedure but still patient refused.  
Ricco Haley [facility] Hospitalist Group  Progress Note    Patient: John Juarez Age: 70 y.o. : 1955 MR#: [id number] SSN: [id number]      Subjective/24-hour events:     No acute events overnight, no new concerns or complaints, vitals stable.    Assessment:   Gastrointestinal stromal tumor  Abdominal pain, secondary to above, improved  Acute on chronic anemia  DM2  Hypertension  Dyslipidemia  Schizophrenia    Plan:   Appreciate oncology assistance  Was able to speak with GI briefly, patient would not benefit from radiation for the sole purpose of bleeding control.  Continue analgesia as necessary.  Given patient refusing transfusion, will check again tomorrow for any further drop off or should there be any and if patient refuses patient likely to be discharged home tomorrow    Anticipated discharge:     Case discussed with:  [x]Patient  [x]Family  [x] Nursing  []Case Management  DVT Prophylaxis:  []Lovenox  []Hep SQ  []SCDs  []Coumadin   []On Heparin gtt []PO anticoagulant    Objective:   VS: BP (!) 131/94   Pulse (!) 101   Temp 98.1 °F (36.7 °C) (Oral)   Resp 17   Ht 1.778 m (5' 10\")   Wt 58.1 kg (128 lb)   SpO2 100%   BMI 18.37 kg/m²      Gen:  In NAD.  Lungs: Clear, no wheezes  Effort nonlabored.  CV: RRR.  Abdomen: Soft, NTTP.  Extremities: Warm, no pitting edema or ischemia.  Neuro:  Awake and alert, moves extremities spontaneously.    Current Facility-Administered Medications   Medication Dose Route Frequency    sennosides-docusate sodium (SENOKOT-S) 8.6-50 MG tablet 2 tablet  2 tablet Oral Daily    haloperidol (HALDOL) tablet 10 mg  10 mg Oral Nightly    insulin lispro (HUMALOG,ADMELOG) injection vial 0-8 Units  0-8 Units SubCUTAneous 4x Daily AC & HS    insulin glargine (LANTUS) injection vial 5 Units  5 Units SubCUTAneous Nightly    sodium chloride flush 0.9 % injection 5-40 mL  5-40 mL IntraVENous 2 times per day    sodium chloride flush 0.9 % injection 5-40 mL  5-40 mL 
Ricco Haley [facility] Hospitalist Group  Progress Note    Patient: John Juarez Age: 70 y.o. : 1955 MR#: [id number] SSN: [id number]      Subjective/24-hour events:     Hemoglobin has continued to drop and is 4.8 on labs this morning.  Patient without any chest pain, dizziness/lightheadedness or shortness of breath.  Wife present at bedside    Assessment:   Gastrointestinal stromal tumor  Abdominal pain, secondary to above, improved  Acute on chronic anemia  DM2  Hypertension  Dyslipidemia  Schizophrenia    Plan:   Had discussion regarding blood transfusion and current critical hemoglobin level.  Patient has not been willing to consider a transfusion but is now stating that he will accept blood as recommended.  Wife is at bedside and is also aware and she is agreeable.  Check iron profile and vitamin B12/folic acid levels.  Supplement as necessary  Continue analgesia as necessary.  Patient has continued to refuse cancer treatment and is still not willing to consider anything other than full code with full interventions.  Given that his pain issues are much better controlled than on admission, suspect that once his severe anemia issues are addressed, he can likely be discharged home with oral pain control and outpatient follow-up with his oncologist.    Anticipated discharge: -    Case discussed with:  [x]Patient  [x]Family  [x] Nursing  []Case Management  DVT Prophylaxis:  []Lovenox  []Hep SQ  []SCDs  []Coumadin   []On Heparin gtt []PO anticoagulant    Objective:   VS: /67   Pulse (!) 116   Temp 98.4 °F (36.9 °C) (Oral)   Resp 17   Ht 1.778 m (5' 10\")   Wt 58.1 kg (128 lb)   SpO2 100%   BMI 18.37 kg/m²      Gen:  In NAD.  Lungs: Clear, no wheezes  Effort nonlabored.  CV: RRR.  Abdomen: Soft, NTTP.  Extremities: Warm, no pitting edema or ischemia.  Neuro:  Awake and alert, moves extremities spontaneously.    Current Facility-Administered Medications   Medication Dose Route 
Ricco Haley [facility] Hospitalist Group  Progress Note    Patient: John Juarez Age: 70 y.o. : 1955 MR#: [id number] SSN: [id number]  Date: 2025        Assessment/Plan:     Hospital Problems           Last Modified POA    * (Principal) Intractable pain 2025 Yes    Hypertension 2025 Yes    Dyslipidemia 2025 Yes    Schizophrenia (HCC) 2025 Yes    Diabetes mellitus, type II (HCC) 2025 Yes    Gastrointestinal stromal tumor (GIST) (HCC) 2025 Yes         Assessment:   Intractable abdominal pain with hx of GIST   Ascites   Anemia - Hgb 6.6   Schizophrenia   DMT2  Hypertension  Dyslipidemia       Plan:  - Initial plan was to perform paracentesis however after abdominal ultrasound it appears patient does not have appreciable free ascites that would be amenable to paracentesis at this time   - 1 U PRBC ordered, pt consented   - Check H&H 1 hour post transfusion   - Pain management with 1 mg IV Dialudid q4h, states pain is tolerable at this time  - SSI with Lantus 5 u nightly   - HemOnc and Palliative care consulted- appreciate assistance       DVT Prophylaxis:  []Lovenox  []Hep SQ  [x]SCDs  []Coumadin   []On Heparin gtt []PO anticoagulant    Anticipated discharge: 2 days     Time spent reviewing records, independently interpreting results, obtaining history from patient or caregiver, performing physical exam, ordering tests and medications, communicating with specialists, documenting in the chart, and coordinating overall care is  35 minutes     Subjective:     Pt s/e @ bedside. No major events overnight. Pt offers no complaints at this time. Denies CP, SOB, cough, abd pain, nvd, headache, dysuria. States abdominal pain is tolerable at this time. Patient consented for blood transfusion, primary RN at bedside during this conversation. Patient states he has received blood in the past.     Hospital Course:     Mr Juarez is a 70 year old Male who presented to [facility] on 2025 with 
[facility] Hospitalist Group  Progress Note    Patient: John Juarez Age: 70 y.o. : 1955 MR#: [id number] SSN: [id number]      Subjective/24-hour events:     Seen earlier today.    Wife present at bedside.  Patient resting comfortably - pain currently controlled, no new other complaints  Has continued to refuse blood transfusion and lab draws    Assessment:   Gastrointestinal stromal tumor  Abdominal pain, secondary to above, improved  Acute on chronic anemia  DM2  Hypertension  Dyslipidemia  Schizophrenia    Plan:   Continue pain control as ordered.  Patient continues to refuse labs and blood transfusion although he was initially consented for transfusion if necessary/recommended.  Spouse has expressed concern about patient's ability to understand and make his own medical decisions given his underlying psychiatric history.  He normally sees Dr. Zimmer on the outpatient basis I have asked Dr. Zimmer to see him while he is here.  Will await recommendations once formal evaluation is obtained.  Psychiatry assistance appreciated in advance.  Would recommend consideration for comfort care/hospice if patient continues to refuse care and this has been expressed with spouse, who appears to be agreeable to considering this option.  Patient has clearly said that he is not interested in any chemotherapy at this point and is continuing to refuse blood work.  Will await formal evaluation.  Chemical DVT prophylaxis on hold given low Hgb.  No obvious bleeding, monitor Hgb.    Anticipated discharge:     Case discussed with:  [x]Patient  [x]Family  [x] Nursing  [x]Case Management  DVT Prophylaxis:  []Lovenox  []Hep SQ  []SCDs  []Coumadin   []On Heparin gtt []PO anticoagulant    Objective:   VS: /62   Pulse (!) 112   Temp 98.4 °F (36.9 °C) (Oral)   Resp 18   Ht 1.778 m (5' 10\")   Wt 58.1 kg (128 lb)   SpO2 100%   BMI 18.37 kg/m²      Gen:  In NAD.  Lungs: Clear, no wheezes  Effort 
[facility] Hospitalist Group  Progress Note    Patient: John Juarez Age: 70 y.o. : 1955 MR#: [id number] SSN: [id number]      Subjective/24-hour events:     Patient with low hemoglobin again today, 6.7.  Patient refused transfusion.    Assessment:   Gastrointestinal stromal tumor  Abdominal pain, secondary to above, improved  Acute on chronic anemia  DM2  Hypertension  Dyslipidemia  Schizophrenia    Plan:   Decreasing hemoglobin, likely chronic bleed from GIST tumor.  Likely to continue downtrending over time.  Patient refused transfusion.  Continue analgesia as necessary.  Per hematology oncology note , possible palliative care Monday and consideration of hospice.  Patient overall may not desire hospice care and may overall want continued aggressive management without cancer care.  Will defer to palliative care on this discussion specifically    Anticipated discharge:     Case discussed with:  [x]Patient  [x]Family  [x] Nursing  []Case Management  DVT Prophylaxis:  []Lovenox  []Hep SQ  []SCDs  []Coumadin   []On Heparin gtt []PO anticoagulant    Objective:   VS: BP (!) 147/95   Pulse (!) 104   Temp 97.9 °F (36.6 °C) (Oral)   Resp 17   Ht 1.778 m (5' 10\")   Wt 58.1 kg (128 lb)   SpO2 98%   BMI 18.37 kg/m²      Gen:  In NAD.  Lungs: Clear, no wheezes  Effort nonlabored.  CV: RRR.  Abdomen: Soft, NTTP.  Extremities: Warm, no pitting edema or ischemia.  Neuro:  Awake and alert, moves extremities spontaneously.    Current Facility-Administered Medications   Medication Dose Route Frequency    morphine (MSIR) tablet 15 mg  15 mg Oral TID    sennosides-docusate sodium (SENOKOT-S) 8.6-50 MG tablet 2 tablet  2 tablet Oral Daily    haloperidol (HALDOL) tablet 10 mg  10 mg Oral Nightly    insulin lispro (HUMALOG,ADMELOG) injection vial 0-8 Units  0-8 Units SubCUTAneous 4x Daily AC & HS    insulin glargine (LANTUS) injection vial 5 Units  5 Units SubCUTAneous Nightly    sodium 
TRANSFER - IN REPORT:    Verbal report received from *** on John Juarez  being received from ED for routine progression of patient care      Report consisted of patient's Situation, Background, Assessment and   Recommendations(SBAR).     Information from the following report(s) MAR and Recent Results was reviewed with the receiving nurse.    Opportunity for questions and clarification was provided.      Assessment completed upon patient's arrival to unit and care assumed.     4 Eyes Skin Assessment     NAME:  John Juarez  YOB: 1955  MEDICAL RECORD NUMBER:  [id number]    The patient is being assessed for  {Reason for Assessment:54694}    I agree that at least one RN has performed a thorough Head to Toe Skin Assessment on the patient. ALL assessment sites listed below have been assessed.      Areas assessed by both nurses:    Head, Face, Ears, Shoulders, Back, Chest, Arms, Elbows, Hands, Sacrum. Buttock, Coccyx, Ischium, and Legs. Feet and Heels        Does the Patient have a Wound? {Action Wound:57924}       Wilfredo Prevention initiated by RN: {YES/NO:19726}  Wound Care Orders initiated by RN: {YES/NO:19726}    Pressure Injury (Stage 1,2,3,4, Unstageable, DTI, NWPT, and Complex wounds) if present, place Wound referral order by RN under : {YES/NO:19726}    New Ostomies, if present place, Ostomy referral order under : {YES/NO:19726}     Nurse 1 eSignature: {Esignature:[id number]}    **SHARE this note so that the co-signing nurse can place an eSignature**    Nurse 2 eSignature: {Esignature:[id number]}    
- 50%   07/02/25 0534 76 - 100%       Nutrition Related Findings:    Pertinent Meds:   Haldol daily  Lantus 5U nightly  SSI  Senna-docusate daily   Pertinent Labs:  No results for input(s): \"GLUCOSE\", \"BUN\", \"CREATININE\", \"NA\", \"K\", \"CL\", \"CO2\", \"CALCIUM\", \"PHOS\", \"MG\" in the last 72 hours.  Recent Labs     07/05/25 2014 07/06/25 0624 07/06/25  1214 07/06/25  2044 07/07/25  0654 07/07/25  1114 07/07/25  1541 07/07/25 2059   POCGLU 265* 186* 216* 189* 97 153* 231* 253*       Last BM: 07/06/25    Skin: Wound Type: None    Edema:    None                     Current Nutrition Intake & Therapies:    Average Meal Intake: Unable to assess (no recent documentation)  Average Supplements Intake: 0%  ADULT DIET; Full Liquid; No Cream of wheat and no tomato  ADULT ORAL NUTRITION SUPPLEMENT; Breakfast, Lunch, Dinner; Clear Liquid Oral Supplement    Anthropometric Measures:  Height: 177.8 cm (5' 10\")  Ideal Body Weight (IBW): 166 lbs (75 kg)    Admission Body Weight: 58.1 kg (128 lb 1.4 oz)  Current Body Weight: 58.6 kg (129 lb 3 oz), 77.8 % IBW. Weight Source: Bed scale  Current BMI (kg/m2): 18.5  Usual Body Weight: 60.3 kg (133 lb) (1/8/25)  % Weight Change (Calculated): -3.7  Weight Adjustment For: No Adjustment                 BMI Categories: Underweight (BMI less than 22) age over 65    Estimated Daily Nutrient Needs:  Energy Requirements Based On: Kcal/kg  Weight Used for Energy Requirements: Current  Energy (kcal/day): 0169-7151 kcals/d (30-35 kcals/kg for hypercatabolism of disease (cancer))  Weight Used for Protein Requirements: Current  Protein (g/day): 70-88g (1.2-1.5g/kg for hypercatabolism of disease (cancer))  Method Used for Fluid Requirements: 1 ml/kcal  Fluid (ml/day): 6009-6159 ml/d or per MD    Nutrition Diagnosis:   Moderate malnutrition, in context of chronic illness related to catabolic illness, altered GI function (hypercatabolism of disease (cancer) + abd bloating/fullness) as evidenced by criteria as 
Folate    Collection Time: 07/05/25  3:04 AM   Result Value Ref Range    Vitamin B-12 >2000 (H) 211 - 911 pg/mL    Folate >20.0 4.6 - 34.8 ng/mL   POCT Glucose    Collection Time: 07/05/25  6:30 AM   Result Value Ref Range    POC Glucose 224 (H) 70 - 110 mg/dL   POCT Glucose    Collection Time: 07/05/25 11:27 AM   Result Value Ref Range    POC Glucose 293 (H) 70 - 110 mg/dL   POCT Glucose    Collection Time: 07/05/25  3:56 PM   Result Value Ref Range    POC Glucose 251 (H) 70 - 110 mg/dL         Signed By: Yaakov Knox MD          
Hailey GODINEZ MD, 4 Units at 07/03/25 2117    insulin glargine (LANTUS) injection vial 5 Units, 5 Units, SubCUTAneous, Nightly, Hailey Meyer MD, 5 Units at 07/03/25 2046    [Held by provider] enoxaparin Sodium (LOVENOX) injection 30 mg, 30 mg, SubCUTAneous, Daily, Holger Young MD    0.9 % sodium chloride infusion, , IntraVENous, PRN, Dirk Kothari, APRN - NP    sodium chloride flush 0.9 % injection 5-40 mL, 5-40 mL, IntraVENous, 2 times per day, Hailey Meyer MD, 10 mL at 07/04/25 0826    sodium chloride flush 0.9 % injection 5-40 mL, 5-40 mL, IntraVENous, PRN, Hailey Meyer MD    0.9 % sodium chloride infusion, , IntraVENous, PRN, Hailey Meyer MD    potassium chloride (KLOR-CON M) extended release tablet 40 mEq, 40 mEq, Oral, PRN **OR** potassium bicarb-citric acid (EFFER-K) effervescent tablet 40 mEq, 40 mEq, Oral, PRN **OR** potassium chloride 10 mEq/100 mL IVPB (Peripheral Line), 10 mEq, IntraVENous, PRN, Hailey Meyer MD    magnesium sulfate 2000 mg in 50 mL IVPB premix, 2,000 mg, IntraVENous, PRN, Hailey Meyer MD    ondansetron (ZOFRAN-ODT) disintegrating tablet 4 mg, 4 mg, Oral, Q8H PRN **OR** ondansetron (ZOFRAN) injection 4 mg, 4 mg, IntraVENous, Q6H PRN, Hailey Meyer MD    polyethylene glycol (GLYCOLAX) packet 17 g, 17 g, Oral, Daily PRN, Hailey Meyer MD, 17 g at 07/03/25 0225    acetaminophen (TYLENOL) tablet 650 mg, 650 mg, Oral, Q6H PRN **OR** acetaminophen (TYLENOL) suppository 650 mg, 650 mg, Rectal, Q6H PRN, Hailey Meyer MD    glucose chewable tablet 16 g, 4 tablet, Oral, PRN, Hailey Meyer MD    dextrose bolus 10% 125 mL, 125 mL, IntraVENous, PRN **OR** dextrose bolus 10% 250 mL, 250 mL, IntraVENous, PRN, Hailey Meyer MD    glucagon injection 1 mg, 1 mg, SubCUTAneous, PRN, Hailey Meyer MD    dextrose 10 % infusion, , IntraVENous, Continuous PRN, Hailey Meyer MD    HYDROmorphone HCl PF (DILAUDID) injection 1 mg, 1 mg, IntraVENous, Q4H PRN, Hailey Meyer MD, 1 mg at 07/02/25

## 2025-07-08 NOTE — DISCHARGE SUMMARY
pioglitazone (ACTOS) 30 MG tablet Take 1 tablet by mouth dailyHistorical Med      haloperidol (HALDOL) 10 MG tablet Take 1 tablet by mouthHistorical Med      metFORMIN (GLUCOPHAGE) 500 MG tablet Take by mouth 2 times daily (with meals)Historical Med      STIVARGA 40 MG tablet Hasn't started yet, DAWHistorical Med      ACCU-CHEK GUIDE strip DAWHistorical Med      imatinib (GLEEVEC) 400 MG chemo tablet Historical MedPaused since today until manually resumed      docusate (COLACE, DULCOLAX) 100 MG CAPS 100 mgHistorical Med      polyethylene glycol (GLYCOLAX) 17 GM/SCOOP powder ceived the following from [facility]: Outside name: polyethylene glycol (MIRALAX) 17 gram/dose powderHistorical Med           STOP taking these medications       dilTIAZem (CARDIZEM) 60 MG tablet Comments:   Reason for Stopping:         dilTIAZem (TIAZAC) 180 MG extended release capsule Comments:   Reason for Stopping:         glimepiride (AMARYL) 2 MG tablet Comments:   Reason for Stopping:         lisinopril (PRINIVIL;ZESTRIL) 20 MG tablet Comments:   Reason for Stopping:         aspirin 81 MG EC tablet Comments:   Reason for Stopping:         FLUoxetine (PROZAC) 10 MG capsule Comments:   Reason for Stopping:                Activity: activity as tolerated    Functional status and cognitive function:    Ambulates without assistance   Status: alert, appears stated age, and cooperative    Diet: Normal diet    Wound Care: none needed        Follow-up: with PCP, Jose Grimes MD in 7-10days      Minutes spent on discharge: >30 minutes spent coordinating this discharge (review instructions/follow-up, prescriptions, preparing report for sign off)

## 2025-07-08 NOTE — CARE COORDINATION
07/08/25 1345   IMM Letter   IMM Letter given to Patient/Family/Significant other/Guardian/POA/by: Rosa Munroe RN   IMM Letter date given: 07/08/25   IMM Letter time given: 1321     Medicare patient had recieved, reviewed and signed 2nd important message from Medicare letter informing them of their right to appeal the discharge. Signed copy has been placed in patient bedside chart.

## 2025-07-08 NOTE — CARE COORDINATION
CM received called from patient wife, she has concerns about his ability to move around safely in the home and has requested a RW and BSC for the patient as he is a fall risk.     CM sent perfect serve  message to attending, requesting an order.       1621  DME order noted, and ordered via parachute.     RW delivered to patient room, BSC will be sent to patients home address on file.

## 2025-07-08 NOTE — PLAN OF CARE
Problem: Chronic Conditions and Co-morbidities  Goal: Patient's chronic conditions and co-morbidity symptoms are monitored and maintained or improved  7/3/2025 1759 by Lucas Krause, RN  Outcome: Progressing  Flowsheets (Taken 7/3/2025 1759)  Care Plan - Patient's Chronic Conditions and Co-Morbidity Symptoms are Monitored and Maintained or Improved:   Monitor and assess patient's chronic conditions and comorbid symptoms for stability, deterioration, or improvement   Collaborate with multidisciplinary team to address chronic and comorbid conditions and prevent exacerbation or deterioration   Update acute care plan with appropriate goals if chronic or comorbid symptoms are exacerbated and prevent overall improvement and discharge       Problem: Discharge Planning  Goal: Discharge to home or other facility with appropriate resources  7/3/2025 1759 by Lucas Krause, RN  Outcome: Progressing  Flowsheets (Taken 7/3/2025 1759)  Discharge to home or other facility with appropriate resources:   Identify barriers to discharge with patient and caregiver   Arrange for needed discharge resources and transportation as appropriate   Identify discharge learning needs (meds, wound care, etc)       Problem: Pain  Goal: Verbalizes/displays adequate comfort level or baseline comfort level  7/3/2025 1759 by Lucas Krause, RN  Outcome: Progressing  Flowsheets (Taken 7/3/2025 1759)  Verbalizes/displays adequate comfort level or baseline comfort level:   Encourage patient to monitor pain and request assistance   Assess pain using appropriate pain scale   Administer analgesics based on type and severity of pain and evaluate response    Problem: ABCDS Injury Assessment  Goal: Absence of physical injury  7/3/2025 1759 by Lucas Krause, RN  Outcome: Progressing  Flowsheets (Taken 7/3/2025 1759)  Absence of Physical Injury: Implement safety measures based on patient assessment    Problem: Skin/Tissue Integrity  Goal: Skin integrity 
  Problem: Chronic Conditions and Co-morbidities  Goal: Patient's chronic conditions and co-morbidity symptoms are monitored and maintained or improved  7/8/2025 1131 by Mignon Valentine, RN  Note: Monitor pt's blood sugar and administer SSI as prescribed   7/8/2025 1129 by Mignon Valentine, RN  Outcome: Progressing  Flowsheets (Taken 7/8/2025 0800)  Care Plan - Patient's Chronic Conditions and Co-Morbidity Symptoms are Monitored and Maintained or Improved:   Monitor and assess patient's chronic conditions and comorbid symptoms for stability, deterioration, or improvement   Collaborate with multidisciplinary team to address chronic and comorbid conditions and prevent exacerbation or deterioration   Update acute care plan with appropriate goals if chronic or comorbid symptoms are exacerbated and prevent overall improvement and discharge     Problem: Discharge Planning  Goal: Discharge to home or other facility with appropriate resources  Outcome: Progressing  Flowsheets (Taken 7/8/2025 0800)  Discharge to home or other facility with appropriate resources:   Identify barriers to discharge with patient and caregiver   Arrange for needed discharge resources and transportation as appropriate   Identify discharge learning needs (meds, wound care, etc)     Problem: Pain  Goal: Verbalizes/displays adequate comfort level or baseline comfort level  7/8/2025 1131 by Mignon Valentine, RN  Note: Pt denies pain  7/8/2025 1129 by Mignon Valentine, RN  Outcome: Progressing     Problem: Skin/Tissue Integrity  Goal: Skin integrity remains intact  7/8/2025 1131 by Mignon Valentine, RN  Note: Skin intact  7/8/2025 1129 by Mignon Valentine, RN  Outcome: Progressing  Flowsheets (Taken 7/8/2025 0800)  Skin Integrity Remains Intact:   Monitor for areas of redness and/or skin breakdown   Assess vascular access sites hourly     Problem: Safety - Adult  Goal: Free from fall injury  7/8/2025 1131 by Mignon Valentine, RN  Note: Pt denies pain from fall, no visible physical 
  Problem: Chronic Conditions and Co-morbidities  Goal: Patient's chronic conditions and co-morbidity symptoms are monitored and maintained or improved  Outcome: Progressing     Problem: Discharge Planning  Goal: Discharge to home or other facility with appropriate resources  Outcome: Progressing     Problem: Pain  Goal: Verbalizes/displays adequate comfort level or baseline comfort level  Outcome: Progressing     Problem: ABCDS Injury Assessment  Goal: Absence of physical injury  Outcome: Progressing     Problem: Skin/Tissue Integrity  Goal: Skin integrity remains intact  Description: 1.  Monitor for areas of redness and/or skin breakdown  2.  Assess vascular access sites hourly  3.  Every 4-6 hours minimum:  Change oxygen saturation probe site  4.  Every 4-6 hours:  If on nasal continuous positive airway pressure, respiratory therapy assess nares and determine need for appliance change or resting period  Outcome: Progressing     
  Problem: Chronic Conditions and Co-morbidities  Goal: Patient's chronic conditions and co-morbidity symptoms are monitored and maintained or improved  Outcome: Progressing  Flowsheets (Taken 7/2/2025 0519)  Care Plan - Patient's Chronic Conditions and Co-Morbidity Symptoms are Monitored and Maintained or Improved:   Monitor and assess patient's chronic conditions and comorbid symptoms for stability, deterioration, or improvement   Collaborate with multidisciplinary team to address chronic and comorbid conditions and prevent exacerbation or deterioration   Update acute care plan with appropriate goals if chronic or comorbid symptoms are exacerbated and prevent overall improvement and discharge     Problem: Discharge Planning  Goal: Discharge to home or other facility with appropriate resources  Outcome: Progressing  Flowsheets (Taken 7/2/2025 0519)  Discharge to home or other facility with appropriate resources:   Identify barriers to discharge with patient and caregiver   Arrange for needed discharge resources and transportation as appropriate   Identify discharge learning needs (meds, wound care, etc)   Refer to discharge planning if patient needs post-hospital services based on physician order or complex needs related to functional status, cognitive ability or social support system     Problem: Pain  Goal: Verbalizes/displays adequate comfort level or baseline comfort level  Outcome: Progressing  Flowsheets (Taken 7/2/2025 0541)  Verbalizes/displays adequate comfort level or baseline comfort level: Encourage patient to monitor pain and request assistance     Problem: ABCDS Injury Assessment  Goal: Absence of physical injury  Outcome: Progressing  Flowsheets (Taken 7/2/2025 6393)  Absence of Physical Injury: Implement safety measures based on patient assessment     Problem: Skin/Tissue Integrity  Goal: Skin integrity remains intact  Description: 1.  Monitor for areas of redness and/or skin breakdown  2.  Assess 
  Problem: Chronic Conditions and Co-morbidities  Goal: Patient's chronic conditions and co-morbidity symptoms are monitored and maintained or improved  Outcome: Progressing  Flowsheets (Taken 7/2/2025 2000)  Care Plan - Patient's Chronic Conditions and Co-Morbidity Symptoms are Monitored and Maintained or Improved: Monitor and assess patient's chronic conditions and comorbid symptoms for stability, deterioration, or improvement     Problem: Discharge Planning  Goal: Discharge to home or other facility with appropriate resources  Outcome: Progressing     Problem: Pain  Goal: Verbalizes/displays adequate comfort level or baseline comfort level  Outcome: Progressing     Problem: ABCDS Injury Assessment  Goal: Absence of physical injury  Outcome: Progressing     Problem: Skin/Tissue Integrity  Goal: Skin integrity remains intact  Description: 1.  Monitor for areas of redness and/or skin breakdown  2.  Assess vascular access sites hourly  3.  Every 4-6 hours minimum:  Change oxygen saturation probe site  4.  Every 4-6 hours:  If on nasal continuous positive airway pressure, respiratory therapy assess nares and determine need for appliance change or resting period  Outcome: Progressing     Problem: Safety - Adult  Goal: Free from fall injury  Outcome: Progressing     Problem: Nutrition Deficit:  Goal: Optimize nutritional status  Outcome: Progressing     
  Problem: Chronic Conditions and Co-morbidities  Goal: Patient's chronic conditions and co-morbidity symptoms are monitored and maintained or improved  Outcome: Progressing  Flowsheets (Taken 7/5/2025 0800)  Care Plan - Patient's Chronic Conditions and Co-Morbidity Symptoms are Monitored and Maintained or Improved:   Monitor and assess patient's chronic conditions and comorbid symptoms for stability, deterioration, or improvement   Collaborate with multidisciplinary team to address chronic and comorbid conditions and prevent exacerbation or deterioration   Update acute care plan with appropriate goals if chronic or comorbid symptoms are exacerbated and prevent overall improvement and discharge  Note: Monitor pt's blood sugar and ensure it maintains within defined limits, pt refusing insulin     Problem: Discharge Planning  Goal: Discharge to home or other facility with appropriate resources  Outcome: Progressing  Flowsheets (Taken 7/5/2025 0800)  Discharge to home or other facility with appropriate resources:   Identify barriers to discharge with patient and caregiver   Arrange for needed discharge resources and transportation as appropriate   Identify discharge learning needs (meds, wound care, etc)     Problem: Pain  Goal: Verbalizes/displays adequate comfort level or baseline comfort level  Outcome: Progressing  Note: Pt with minimal complaints of pain, pt refusing scheduled morphine    Problem: Skin/Tissue Integrity  Goal: Skin integrity remains intact  Outcome: Progressing  Flowsheets (Taken 7/5/2025 0800)  Skin Integrity Remains Intact:   Monitor for areas of redness and/or skin breakdown   Assess vascular access sites hourly   Note: Monitor pt's skin for breakdown, ambulate pt as tolerated, encourage pt to reposition self in bed at least every 2 hours  
  Problem: Chronic Conditions and Co-morbidities  Goal: Patient's chronic conditions and co-morbidity symptoms are monitored and maintained or improved  Outcome: Progressing  Flowsheets (Taken 7/7/2025 0839)  Care Plan - Patient's Chronic Conditions and Co-Morbidity Symptoms are Monitored and Maintained or Improved:   Monitor and assess patient's chronic conditions and comorbid symptoms for stability, deterioration, or improvement   Collaborate with multidisciplinary team to address chronic and comorbid conditions and prevent exacerbation or deterioration   Update acute care plan with appropriate goals if chronic or comorbid symptoms are exacerbated and prevent overall improvement and discharge     Problem: Discharge Planning  Goal: Discharge to home or other facility with appropriate resources  Outcome: Progressing  Flowsheets (Taken 7/7/2025 0839)  Discharge to home or other facility with appropriate resources:   Identify barriers to discharge with patient and caregiver   Arrange for needed discharge resources and transportation as appropriate   Identify discharge learning needs (meds, wound care, etc)       Problem: Skin/Tissue Integrity  Goal: Skin integrity remains intact  Outcome: Progressing  Flowsheets  Taken 7/7/2025 1115  Skin Integrity Remains Intact:   Monitor for areas of redness and/or skin breakdown   Assess vascular access sites hourly  Taken 7/7/2025 0839  Skin Integrity Remains Intact: Monitor for areas of redness and/or skin breakdown      
Progressing  Flowsheets (Taken 7/4/2025 1846)  Skin Integrity Remains Intact:   Monitor for areas of redness and/or skin breakdown   Turn and reposition as indicated     Problem: Safety - Adult  Goal: Free from fall injury  Outcome: Progressing  Flowsheets (Taken 7/4/2025 1846)  Free From Fall Injury:   Instruct family/caregiver on patient safety   Based on caregiver fall risk screen, instruct family/caregiver to ask for assistance with transferring infant if caregiver noted to have fall risk factors     Problem: Nutrition Deficit:  Goal: Optimize nutritional status  Outcome: Progressing  Flowsheets (Taken 7/4/2025 1846)  Nutrient intake appropriate for improving, restoring, or maintaining nutritional needs:   Monitor oral intake, labs, and treatment plans   Assess nutritional status and recommend course of action     
Assess vascular access sites hourly  3.  Every 4-6 hours minimum:  Change oxygen saturation probe site  4.  Every 4-6 hours:  If on nasal continuous positive airway pressure, respiratory therapy assess nares and determine need for appliance change or resting period  7/2/2025 0817 by Dyana Guevara RN  Outcome: Progressing  Flowsheets (Taken 7/2/2025 0541 by Josephine Taylor, RN)  Skin Integrity Remains Intact: Monitor for areas of redness and/or skin breakdown  Note: Monitor for s/s of skin breakdown.  7/2/2025 0541 by Josephine Taylor RN  Outcome: Progressing  Flowsheets  Taken 7/2/2025 0541  Skin Integrity Remains Intact: Monitor for areas of redness and/or skin breakdown  Taken 7/2/2025 0519  Skin Integrity Remains Intact: Monitor for areas of redness and/or skin breakdown     Problem: Safety - Adult  Goal: Free from fall injury  7/2/2025 0817 by Dyana Guevara RN  Outcome: Progressing  Note: Standard fall precautions.  7/2/2025 0541 by Josephine Taylor RN  Outcome: Progressing  Flowsheets (Taken 7/2/2025 0541)  Free From Fall Injury: Instruct family/caregiver on patient safety

## 2025-07-08 NOTE — DISCHARGE INSTRUCTIONS
DISCHARGE SUMMARY from Nurse    PATIENT INSTRUCTIONS:    What to do at Home:  Recommended activity: activity as tolerated    If you experience any of the following symptoms loss of consciousness, light headedness, shortness of breath, change in mentation, please follow up with emergency department or primary care provider.    *  Please give a list of your current medications to your Primary Care Provider.    *  Please update this list whenever your medications are discontinued, doses are      changed, or new medications (including over-the-counter products) are added.    *  Please carry medication information at all times in case of emergency situations.    These are general instructions for a healthy lifestyle:    No smoking/ No tobacco products/ Avoid exposure to second hand smoke  Surgeon General's Warning:  Quitting smoking now greatly reduces serious risk to your health.    Obesity, smoking, and sedentary lifestyle greatly increases your risk for illness    A healthy diet, regular physical exercise & weight monitoring are important for maintaining a healthy lifestyle    You may be retaining fluid if you have a history of heart failure or if you experience any of the following symptoms:  Weight gain of 3 pounds or more overnight or 5 pounds in a week, increased swelling in our hands or feet or shortness of breath while lying flat in bed.  Please call your doctor as soon as you notice any of these symptoms; do not wait until your next office visit.        The discharge information has been reviewed with the patient.  The patient verbalized understanding.  Discharge medications reviewed with the patient and appropriate educational materials and side effects teaching were provided.  ___________________________________________________________________________________________________________________________________

## 2025-07-08 NOTE — CARE COORDINATION
Discharge order noted for today.     Orders received.     No needs identified at this time. Spouse to transport     Case management remains available as needed.